# Patient Record
Sex: MALE | Race: WHITE | NOT HISPANIC OR LATINO | Employment: OTHER | URBAN - METROPOLITAN AREA
[De-identification: names, ages, dates, MRNs, and addresses within clinical notes are randomized per-mention and may not be internally consistent; named-entity substitution may affect disease eponyms.]

---

## 2017-01-01 ENCOUNTER — APPOINTMENT (OUTPATIENT)
Dept: CT IMAGING | Facility: HOSPITAL | Age: 67
End: 2017-01-01

## 2017-01-01 ENCOUNTER — APPOINTMENT (OUTPATIENT)
Dept: GENERAL RADIOLOGY | Facility: HOSPITAL | Age: 67
End: 2017-01-01

## 2017-01-01 ENCOUNTER — HOSPITAL ENCOUNTER (INPATIENT)
Facility: HOSPITAL | Age: 67
LOS: 2 days | Discharge: HOME | End: 2017-07-08
Attending: INTERNAL MEDICINE | Admitting: INTERNAL MEDICINE

## 2017-01-01 ENCOUNTER — HOSPITAL ENCOUNTER (EMERGENCY)
Facility: HOSPITAL | Age: 67
Discharge: LEFT BEFORE DISCHARGE | End: 2017-07-26
Attending: EMERGENCY MEDICINE | Admitting: EMERGENCY MEDICINE

## 2017-01-01 ENCOUNTER — HOSPITAL ENCOUNTER (OUTPATIENT)
Dept: CARDIOLOGY | Facility: HOSPITAL | Age: 67
Discharge: HOME | End: 2017-06-19
Attending: INTERNAL MEDICINE | Admitting: INTERNAL MEDICINE

## 2017-01-01 ENCOUNTER — LAB (OUTPATIENT)
Dept: LAB | Facility: HOSPITAL | Age: 67
End: 2017-01-01

## 2017-01-01 ENCOUNTER — OFFICE VISIT (OUTPATIENT)
Dept: CARDIOLOGY | Facility: HOSPITAL | Age: 67
End: 2017-01-01

## 2017-01-01 ENCOUNTER — OFFICE VISIT (OUTPATIENT)
Dept: CARDIOLOGY | Facility: CLINIC | Age: 67
End: 2017-01-01

## 2017-01-01 ENCOUNTER — APPOINTMENT (OUTPATIENT)
Dept: MRI IMAGING | Facility: HOSPITAL | Age: 67
End: 2017-01-01

## 2017-01-01 ENCOUNTER — HOSPITAL ENCOUNTER (INPATIENT)
Facility: HOSPITAL | Age: 67
LOS: 5 days | Discharge: HOME HEALTH | End: 2017-07-19
Attending: EMERGENCY MEDICINE | Admitting: FAMILY MEDICINE

## 2017-01-01 ENCOUNTER — OFFICE VISIT (OUTPATIENT)
Dept: NEUROLOGY | Facility: CLINIC | Age: 67
End: 2017-01-01

## 2017-01-01 ENCOUNTER — APPOINTMENT (OUTPATIENT)
Dept: CARDIOLOGY | Facility: HOSPITAL | Age: 67
End: 2017-01-01
Attending: INTERNAL MEDICINE

## 2017-01-01 ENCOUNTER — PROCEDURE VISIT (OUTPATIENT)
Dept: CARDIOLOGY | Facility: HOSPITAL | Age: 67
End: 2017-01-01

## 2017-01-01 ENCOUNTER — TELEPHONE (OUTPATIENT)
Dept: CARDIOLOGY | Facility: CLINIC | Age: 67
End: 2017-01-01

## 2017-01-01 ENCOUNTER — TRANSCRIBE ORDERS (OUTPATIENT)
Dept: LAB | Facility: HOSPITAL | Age: 67
End: 2017-01-01

## 2017-01-01 ENCOUNTER — APPOINTMENT (OUTPATIENT)
Dept: GENERAL RADIOLOGY | Facility: HOSPITAL | Age: 67
End: 2017-01-01
Attending: INTERNAL MEDICINE

## 2017-01-01 ENCOUNTER — HOSPITAL ENCOUNTER (OUTPATIENT)
Dept: GENERAL RADIOLOGY | Facility: HOSPITAL | Age: 67
Discharge: HOME | End: 2017-05-30
Attending: INTERNAL MEDICINE | Admitting: INTERNAL MEDICINE

## 2017-01-01 VITALS
HEIGHT: 71 IN | SYSTOLIC BLOOD PRESSURE: 138 MMHG | HEART RATE: 80 BPM | RESPIRATION RATE: 18 BRPM | BODY MASS INDEX: 31.3 KG/M2 | OXYGEN SATURATION: 93 % | TEMPERATURE: 98.1 F | DIASTOLIC BLOOD PRESSURE: 76 MMHG | WEIGHT: 223.6 LBS

## 2017-01-01 VITALS
OXYGEN SATURATION: 90 % | HEART RATE: 78 BPM | RESPIRATION RATE: 20 BRPM | HEIGHT: 71 IN | WEIGHT: 235 LBS | TEMPERATURE: 98 F | DIASTOLIC BLOOD PRESSURE: 96 MMHG | SYSTOLIC BLOOD PRESSURE: 156 MMHG | BODY MASS INDEX: 32.9 KG/M2

## 2017-01-01 VITALS
HEART RATE: 63 BPM | WEIGHT: 236.8 LBS | SYSTOLIC BLOOD PRESSURE: 126 MMHG | HEIGHT: 71 IN | OXYGEN SATURATION: 94 % | DIASTOLIC BLOOD PRESSURE: 76 MMHG | BODY MASS INDEX: 33.15 KG/M2

## 2017-01-01 VITALS
BODY MASS INDEX: 34.3 KG/M2 | OXYGEN SATURATION: 96 % | WEIGHT: 245 LBS | TEMPERATURE: 98.3 F | HEIGHT: 71 IN | SYSTOLIC BLOOD PRESSURE: 115 MMHG | HEART RATE: 67 BPM | DIASTOLIC BLOOD PRESSURE: 57 MMHG | RESPIRATION RATE: 18 BRPM

## 2017-01-01 VITALS
SYSTOLIC BLOOD PRESSURE: 128 MMHG | TEMPERATURE: 97 F | HEIGHT: 71 IN | DIASTOLIC BLOOD PRESSURE: 77 MMHG | OXYGEN SATURATION: 99 % | WEIGHT: 234.93 LBS | HEART RATE: 68 BPM | RESPIRATION RATE: 16 BRPM | BODY MASS INDEX: 32.89 KG/M2

## 2017-01-01 VITALS
SYSTOLIC BLOOD PRESSURE: 127 MMHG | RESPIRATION RATE: 22 BRPM | DIASTOLIC BLOOD PRESSURE: 85 MMHG | OXYGEN SATURATION: 96 % | HEIGHT: 71 IN | HEART RATE: 73 BPM | WEIGHT: 247 LBS | TEMPERATURE: 97.9 F | BODY MASS INDEX: 34.58 KG/M2

## 2017-01-01 VITALS — HEIGHT: 71 IN | WEIGHT: 232 LBS | BODY MASS INDEX: 32.48 KG/M2

## 2017-01-01 VITALS
WEIGHT: 254 LBS | BODY MASS INDEX: 35.56 KG/M2 | SYSTOLIC BLOOD PRESSURE: 112 MMHG | DIASTOLIC BLOOD PRESSURE: 66 MMHG | HEIGHT: 71 IN | HEART RATE: 72 BPM

## 2017-01-01 VITALS
HEIGHT: 71 IN | BODY MASS INDEX: 33.46 KG/M2 | WEIGHT: 239 LBS | SYSTOLIC BLOOD PRESSURE: 130 MMHG | RESPIRATION RATE: 20 BRPM | DIASTOLIC BLOOD PRESSURE: 82 MMHG | TEMPERATURE: 97.7 F | HEART RATE: 69 BPM | OXYGEN SATURATION: 97 %

## 2017-01-01 VITALS
HEART RATE: 79 BPM | OXYGEN SATURATION: 96 % | BODY MASS INDEX: 34.02 KG/M2 | RESPIRATION RATE: 20 BRPM | TEMPERATURE: 97.2 F | SYSTOLIC BLOOD PRESSURE: 135 MMHG | HEIGHT: 71 IN | DIASTOLIC BLOOD PRESSURE: 79 MMHG | WEIGHT: 243 LBS

## 2017-01-01 VITALS
DIASTOLIC BLOOD PRESSURE: 81 MMHG | HEIGHT: 71 IN | SYSTOLIC BLOOD PRESSURE: 122 MMHG | BODY MASS INDEX: 33.74 KG/M2 | WEIGHT: 241 LBS | HEART RATE: 62 BPM

## 2017-01-01 VITALS
BODY MASS INDEX: 32.59 KG/M2 | HEART RATE: 67 BPM | WEIGHT: 232.8 LBS | HEIGHT: 71 IN | DIASTOLIC BLOOD PRESSURE: 82 MMHG | SYSTOLIC BLOOD PRESSURE: 106 MMHG

## 2017-01-01 DIAGNOSIS — J44.9 CHRONIC OBSTRUCTIVE PULMONARY DISEASE, UNSPECIFIED COPD TYPE (HCC): ICD-10-CM

## 2017-01-01 DIAGNOSIS — Z72.0 TOBACCO USE: ICD-10-CM

## 2017-01-01 DIAGNOSIS — I48.0 PAF (PAROXYSMAL ATRIAL FIBRILLATION) (HCC): ICD-10-CM

## 2017-01-01 DIAGNOSIS — I38 VALVULAR HEART DISEASE: ICD-10-CM

## 2017-01-01 DIAGNOSIS — R78.81 BACTEREMIA: ICD-10-CM

## 2017-01-01 DIAGNOSIS — K56.600 PARTIAL SMALL BOWEL OBSTRUCTION (HCC): Primary | ICD-10-CM

## 2017-01-01 DIAGNOSIS — I25.10 CORONARY ARTERY DISEASE INVOLVING NATIVE HEART WITHOUT ANGINA PECTORIS, UNSPECIFIED VESSEL OR LESION TYPE: Primary | ICD-10-CM

## 2017-01-01 DIAGNOSIS — R01.1 UNDIAGNOSED CARDIAC MURMURS: ICD-10-CM

## 2017-01-01 DIAGNOSIS — E78.5 HYPERLIPIDEMIA LDL GOAL <70: ICD-10-CM

## 2017-01-01 DIAGNOSIS — I25.709 CORONARY ARTERY DISEASE INVOLVING CORONARY BYPASS GRAFT OF NATIVE HEART WITH ANGINA PECTORIS (HCC): ICD-10-CM

## 2017-01-01 DIAGNOSIS — G89.29 CHRONIC LOW BACK PAIN WITH SCIATICA, SCIATICA LATERALITY UNSPECIFIED, UNSPECIFIED BACK PAIN LATERALITY: ICD-10-CM

## 2017-01-01 DIAGNOSIS — N18.30 CHRONIC KIDNEY DISEASE, STAGE III (MODERATE) (HCC): ICD-10-CM

## 2017-01-01 DIAGNOSIS — Z99.81 SUPPLEMENTAL OXYGEN DEPENDENT: ICD-10-CM

## 2017-01-01 DIAGNOSIS — I25.10 CORONARY ARTERY DISEASE INVOLVING NATIVE HEART WITHOUT ANGINA PECTORIS, UNSPECIFIED VESSEL OR LESION TYPE: ICD-10-CM

## 2017-01-01 DIAGNOSIS — I50.33 ACUTE ON CHRONIC DIASTOLIC HEART FAILURE (HCC): Primary | ICD-10-CM

## 2017-01-01 DIAGNOSIS — I25.10 CORONARY ARTERY DISEASE INVOLVING NATIVE CORONARY ARTERY OF NATIVE HEART WITHOUT ANGINA PECTORIS: ICD-10-CM

## 2017-01-01 DIAGNOSIS — Z79.899 HIGH RISK MEDICATION USE: ICD-10-CM

## 2017-01-01 DIAGNOSIS — Z99.81 OXYGEN DEPENDENT: ICD-10-CM

## 2017-01-01 DIAGNOSIS — M54.40 CHRONIC LOW BACK PAIN WITH SCIATICA, SCIATICA LATERALITY UNSPECIFIED, UNSPECIFIED BACK PAIN LATERALITY: ICD-10-CM

## 2017-01-01 DIAGNOSIS — D61.818 ACQUIRED PANCYTOPENIA (HCC): ICD-10-CM

## 2017-01-01 DIAGNOSIS — R10.11 RUQ ABDOMINAL PAIN: ICD-10-CM

## 2017-01-01 DIAGNOSIS — I25.10 CORONARY ARTERY DISEASE INVOLVING NATIVE CORONARY ARTERY OF NATIVE HEART, ANGINA PRESENCE UNSPECIFIED: ICD-10-CM

## 2017-01-01 DIAGNOSIS — I45.2 BIFASCICULAR BLOCK: ICD-10-CM

## 2017-01-01 DIAGNOSIS — G47.33 OSA (OBSTRUCTIVE SLEEP APNEA): ICD-10-CM

## 2017-01-01 DIAGNOSIS — F11.90 CHRONIC NARCOTIC USE: ICD-10-CM

## 2017-01-01 DIAGNOSIS — I10 ESSENTIAL HYPERTENSION: ICD-10-CM

## 2017-01-01 DIAGNOSIS — I48.0 PAROXYSMAL ATRIAL FIBRILLATION (HCC): ICD-10-CM

## 2017-01-01 DIAGNOSIS — I50.22 CHRONIC SYSTOLIC CONGESTIVE HEART FAILURE (HCC): Primary | ICD-10-CM

## 2017-01-01 DIAGNOSIS — I50.33 ACUTE ON CHRONIC DIASTOLIC HEART FAILURE (HCC): ICD-10-CM

## 2017-01-01 DIAGNOSIS — D15.1 BENIGN NEOPLASM OF HEART: ICD-10-CM

## 2017-01-01 DIAGNOSIS — M54.41 CHRONIC LOW BACK PAIN WITH BILATERAL SCIATICA, UNSPECIFIED BACK PAIN LATERALITY: ICD-10-CM

## 2017-01-01 DIAGNOSIS — Z79.899 HIGH RISK MEDICATION USE: Primary | ICD-10-CM

## 2017-01-01 DIAGNOSIS — A41.01 METHICILLIN SUSCEPTIBLE STAPHYLOCOCCUS AUREUS SEPTICEMIA (HCC): ICD-10-CM

## 2017-01-01 DIAGNOSIS — I25.118 CORONARY ARTERY DISEASE WITH OTHER FORM OF ANGINA PECTORIS, UNSPECIFIED VESSEL OR LESION TYPE, UNSPECIFIED WHETHER NATIVE OR TRANSPLANTED HEART (HCC): ICD-10-CM

## 2017-01-01 DIAGNOSIS — E83.42 HYPOMAGNESEMIA: ICD-10-CM

## 2017-01-01 DIAGNOSIS — Z86.73 HISTORY OF STROKE: ICD-10-CM

## 2017-01-01 DIAGNOSIS — I48.0 PAF (PAROXYSMAL ATRIAL FIBRILLATION) (HCC): Primary | ICD-10-CM

## 2017-01-01 DIAGNOSIS — E78.5 DYSLIPIDEMIA: ICD-10-CM

## 2017-01-01 DIAGNOSIS — K82.9 GALLBLADDER DISEASE: ICD-10-CM

## 2017-01-01 DIAGNOSIS — I50.23 ACUTE ON CHRONIC SYSTOLIC CONGESTIVE HEART FAILURE (HCC): ICD-10-CM

## 2017-01-01 DIAGNOSIS — I50.22 CHRONIC SYSTOLIC CONGESTIVE HEART FAILURE (HCC): ICD-10-CM

## 2017-01-01 DIAGNOSIS — D61.818 ACQUIRED PANCYTOPENIA (HCC): Primary | ICD-10-CM

## 2017-01-01 DIAGNOSIS — R55 SYNCOPE, UNSPECIFIED SYNCOPE TYPE: Primary | ICD-10-CM

## 2017-01-01 DIAGNOSIS — G89.29 CHRONIC LOW BACK PAIN WITH BILATERAL SCIATICA, UNSPECIFIED BACK PAIN LATERALITY: ICD-10-CM

## 2017-01-01 DIAGNOSIS — G25.0 ESSENTIAL TREMOR: ICD-10-CM

## 2017-01-01 DIAGNOSIS — D63.8 ANEMIA OF CHRONIC DISEASE: ICD-10-CM

## 2017-01-01 DIAGNOSIS — M54.42 CHRONIC LOW BACK PAIN WITH BILATERAL SCIATICA, UNSPECIFIED BACK PAIN LATERALITY: ICD-10-CM

## 2017-01-01 DIAGNOSIS — K56.609 SBO (SMALL BOWEL OBSTRUCTION) (HCC): ICD-10-CM

## 2017-01-01 DIAGNOSIS — F41.9 ANXIETY: ICD-10-CM

## 2017-01-01 DIAGNOSIS — N18.30 CKD (CHRONIC KIDNEY DISEASE) STAGE 3, GFR 30-59 ML/MIN (HCC): ICD-10-CM

## 2017-01-01 DIAGNOSIS — E83.51 HYPOCALCEMIA: ICD-10-CM

## 2017-01-01 DIAGNOSIS — I25.709 CORONARY ARTERY DISEASE INVOLVING CORONARY BYPASS GRAFT OF NATIVE HEART WITH ANGINA PECTORIS (HCC): Primary | ICD-10-CM

## 2017-01-01 DIAGNOSIS — I50.23 ACUTE ON CHRONIC SYSTOLIC HEART FAILURE (HCC): Primary | ICD-10-CM

## 2017-01-01 DIAGNOSIS — G93.40 ENCEPHALOPATHY: Primary | ICD-10-CM

## 2017-01-01 DIAGNOSIS — D50.9 IRON DEFICIENCY ANEMIA, UNSPECIFIED IRON DEFICIENCY ANEMIA TYPE: ICD-10-CM

## 2017-01-01 DIAGNOSIS — I25.119 CORONARY ARTERY DISEASE WITH ANGINA PECTORIS, UNSPECIFIED VESSEL OR LESION TYPE, UNSPECIFIED WHETHER NATIVE OR TRANSPLANTED HEART (HCC): ICD-10-CM

## 2017-01-01 LAB
ALBUMIN SERPL-MCNC: 3.5 G/DL (ref 3.2–4.8)
ALBUMIN SERPL-MCNC: 3.9 G/DL (ref 3.2–4.8)
ALBUMIN SERPL-MCNC: 3.9 G/DL (ref 3.2–4.8)
ALBUMIN SERPL-MCNC: 4 G/DL (ref 3.2–4.8)
ALBUMIN SERPL-MCNC: 4.3 G/DL (ref 3.2–4.8)
ALBUMIN SERPL-MCNC: 4.5 G/DL (ref 3.2–4.8)
ALBUMIN/GLOB SERPL: 0.9 G/DL (ref 1.5–2.5)
ALBUMIN/GLOB SERPL: 0.9 G/DL (ref 1.5–2.5)
ALBUMIN/GLOB SERPL: 1 G/DL (ref 1.5–2.5)
ALBUMIN/GLOB SERPL: 1 G/DL (ref 1.5–2.5)
ALBUMIN/GLOB SERPL: 1.1 G/DL (ref 1.5–2.5)
ALBUMIN/GLOB SERPL: 1.3 G/DL (ref 1.5–2.5)
ALP SERPL-CCNC: 66 U/L (ref 25–100)
ALP SERPL-CCNC: 67 U/L (ref 25–100)
ALP SERPL-CCNC: 69 U/L (ref 25–100)
ALP SERPL-CCNC: 72 U/L (ref 25–100)
ALP SERPL-CCNC: 74 U/L (ref 25–100)
ALP SERPL-CCNC: 86 U/L (ref 25–100)
ALT SERPL W P-5'-P-CCNC: 10 U/L (ref 7–40)
ALT SERPL W P-5'-P-CCNC: 11 U/L (ref 7–40)
ALT SERPL W P-5'-P-CCNC: 12 U/L (ref 7–40)
ALT SERPL W P-5'-P-CCNC: 17 U/L (ref 7–40)
ALT SERPL W P-5'-P-CCNC: 5 U/L (ref 7–40)
ALT SERPL W P-5'-P-CCNC: 7 U/L (ref 7–40)
AMMONIA BLD-SCNC: 45 UMOL/L (ref 9–30)
ANION GAP SERPL CALCULATED.3IONS-SCNC: 10 MMOL/L (ref 3–11)
ANION GAP SERPL CALCULATED.3IONS-SCNC: 10 MMOL/L (ref 3–11)
ANION GAP SERPL CALCULATED.3IONS-SCNC: 11 MMOL/L (ref 3–11)
ANION GAP SERPL CALCULATED.3IONS-SCNC: 15.7 MMOL/L
ANION GAP SERPL CALCULATED.3IONS-SCNC: 19.1 MMOL/L
ANION GAP SERPL CALCULATED.3IONS-SCNC: 20.9 MMOL/L
ANION GAP SERPL CALCULATED.3IONS-SCNC: 3 MMOL/L (ref 3–11)
ANION GAP SERPL CALCULATED.3IONS-SCNC: 6 MMOL/L (ref 3–11)
ANION GAP SERPL CALCULATED.3IONS-SCNC: 6 MMOL/L (ref 3–11)
ANION GAP SERPL CALCULATED.3IONS-SCNC: 7 MMOL/L (ref 3–11)
ANION GAP SERPL CALCULATED.3IONS-SCNC: 7 MMOL/L (ref 3–11)
ANION GAP SERPL CALCULATED.3IONS-SCNC: 8 MMOL/L (ref 3–11)
ANION GAP SERPL CALCULATED.3IONS-SCNC: 9 MMOL/L (ref 3–11)
ANISOCYTOSIS BLD QL: ABNORMAL
ANISOCYTOSIS BLD QL: NORMAL
ARTERIAL PATENCY WRIST A: POSITIVE
AST SERPL-CCNC: 13 U/L (ref 0–33)
AST SERPL-CCNC: 14 U/L (ref 0–33)
AST SERPL-CCNC: 15 U/L (ref 0–33)
AST SERPL-CCNC: 18 U/L (ref 0–33)
AST SERPL-CCNC: 19 U/L (ref 0–33)
AST SERPL-CCNC: 24 U/L (ref 0–33)
BACTERIA BLD CULT: ABNORMAL
BACTERIA SPEC AEROBE CULT: ABNORMAL
BACTERIA SPEC AEROBE CULT: ABNORMAL
BACTERIA SPEC AEROBE CULT: NORMAL
BACTERIA UR QL AUTO: ABNORMAL /HPF
BACTERIA UR QL AUTO: NORMAL /HPF
BASE EXCESS BLDA CALC-SCNC: 3.9 MMOL/L
BASOPHILS # BLD AUTO: 0.01 10*3/MM3 (ref 0–0.2)
BASOPHILS # BLD AUTO: 0.02 10*3/MM3 (ref 0–0.2)
BASOPHILS # BLD AUTO: 0.03 10*3/MM3 (ref 0–0.2)
BASOPHILS # BLD AUTO: 0.04 10*3/MM3 (ref 0–0.2)
BASOPHILS # BLD AUTO: 0.05 10*3/MM3 (ref 0–0.2)
BASOPHILS # BLD AUTO: 0.05 10*3/MM3 (ref 0–0.2)
BASOPHILS NFR BLD AUTO: 0.2 % (ref 0–1)
BASOPHILS NFR BLD AUTO: 0.3 % (ref 0–1)
BASOPHILS NFR BLD AUTO: 0.3 % (ref 0–2.5)
BASOPHILS NFR BLD AUTO: 0.5 % (ref 0–1)
BASOPHILS NFR BLD AUTO: 0.7 % (ref 0–1)
BASOPHILS NFR BLD AUTO: 0.9 % (ref 0–1)
BDY SITE: ABNORMAL
BH CV ECHO MEAS - AO MAX PG (FULL): 35.6 MMHG
BH CV ECHO MEAS - AO MAX PG (FULL): 47.6 MMHG
BH CV ECHO MEAS - AO MAX PG: 40.8 MMHG
BH CV ECHO MEAS - AO MAX PG: 51 MMHG
BH CV ECHO MEAS - AO MEAN PG (FULL): 20.2 MMHG
BH CV ECHO MEAS - AO MEAN PG (FULL): 24.7 MMHG
BH CV ECHO MEAS - AO MEAN PG: 23.2 MMHG
BH CV ECHO MEAS - AO MEAN PG: 27 MMHG
BH CV ECHO MEAS - AO ROOT AREA (BSA CORRECTED): 1.5
BH CV ECHO MEAS - AO ROOT AREA (BSA CORRECTED): 1.5
BH CV ECHO MEAS - AO ROOT AREA: 9.1 CM^2
BH CV ECHO MEAS - AO ROOT AREA: 9.1 CM^2
BH CV ECHO MEAS - AO ROOT DIAM: 3.4 CM
BH CV ECHO MEAS - AO ROOT DIAM: 3.4 CM
BH CV ECHO MEAS - AO V2 MAX: 319 CM/SEC
BH CV ECHO MEAS - AO V2 MAX: 359 CM/SEC
BH CV ECHO MEAS - AO V2 MEAN: 223.2 CM/SEC
BH CV ECHO MEAS - AO V2 MEAN: 242 CM/SEC
BH CV ECHO MEAS - AO V2 VTI: 68.4 CM
BH CV ECHO MEAS - AO V2 VTI: 79.9 CM
BH CV ECHO MEAS - AVA(I,A): 0.89 CM^2
BH CV ECHO MEAS - AVA(I,A): 1 CM^2
BH CV ECHO MEAS - AVA(I,D): 0.89 CM^2
BH CV ECHO MEAS - AVA(I,D): 1 CM^2
BH CV ECHO MEAS - AVA(V,A): 0.87 CM^2
BH CV ECHO MEAS - AVA(V,A): 1.1 CM^2
BH CV ECHO MEAS - AVA(V,D): 0.87 CM^2
BH CV ECHO MEAS - AVA(V,D): 1.1 CM^2
BH CV ECHO MEAS - BSA(HAYCOCK): 2.3 M^2
BH CV ECHO MEAS - BSA(HAYCOCK): 2.3 M^2
BH CV ECHO MEAS - BSA: 2.2 M^2
BH CV ECHO MEAS - BSA: 2.2 M^2
BH CV ECHO MEAS - BZI_BMI: 32.4 KILOGRAMS/M^2
BH CV ECHO MEAS - BZI_BMI: 32.4 KILOGRAMS/M^2
BH CV ECHO MEAS - BZI_METRIC_HEIGHT: 180.3 CM
BH CV ECHO MEAS - BZI_METRIC_HEIGHT: 180.3 CM
BH CV ECHO MEAS - BZI_METRIC_WEIGHT: 105.2 KG
BH CV ECHO MEAS - BZI_METRIC_WEIGHT: 105.2 KG
BH CV ECHO MEAS - CONTRAST EF (2CH): 36.3 ML/M^2
BH CV ECHO MEAS - CONTRAST EF (2CH): 37.9 ML/M^2
BH CV ECHO MEAS - CONTRAST EF 4CH: 39.6 ML/M^2
BH CV ECHO MEAS - CONTRAST EF 4CH: 44 ML/M^2
BH CV ECHO MEAS - EDV(CUBED): 182.3 ML
BH CV ECHO MEAS - EDV(CUBED): 191.1 ML
BH CV ECHO MEAS - EDV(MOD-SP2): 132 ML
BH CV ECHO MEAS - EDV(MOD-SP2): 160 ML
BH CV ECHO MEAS - EDV(MOD-SP4): 149 ML
BH CV ECHO MEAS - EDV(MOD-SP4): 200 ML
BH CV ECHO MEAS - EDV(TEICH): 158.1 ML
BH CV ECHO MEAS - EDV(TEICH): 163.9 ML
BH CV ECHO MEAS - EF(CUBED): 59.8 %
BH CV ECHO MEAS - EF(CUBED): 65.4 %
BH CV ECHO MEAS - EF(MOD-SP2): 36.3 %
BH CV ECHO MEAS - EF(MOD-SP2): 37.9 %
BH CV ECHO MEAS - EF(MOD-SP4): 39.6 %
BH CV ECHO MEAS - EF(TEICH): 50.7 %
BH CV ECHO MEAS - EF(TEICH): 56.3 %
BH CV ECHO MEAS - ESV(CUBED): 63 ML
BH CV ECHO MEAS - ESV(CUBED): 76.8 ML
BH CV ECHO MEAS - ESV(MOD-SP2): 102 ML
BH CV ECHO MEAS - ESV(MOD-SP2): 82 ML
BH CV ECHO MEAS - ESV(MOD-SP4): 112 ML
BH CV ECHO MEAS - ESV(MOD-SP4): 90 ML
BH CV ECHO MEAS - ESV(TEICH): 69.2 ML
BH CV ECHO MEAS - ESV(TEICH): 80.8 ML
BH CV ECHO MEAS - FS: 26.2 %
BH CV ECHO MEAS - FS: 29.8 %
BH CV ECHO MEAS - IVS/LVPW: 0.95
BH CV ECHO MEAS - IVS/LVPW: 1
BH CV ECHO MEAS - IVSD: 1.2 CM
BH CV ECHO MEAS - IVSD: 1.2 CM
BH CV ECHO MEAS - LA DIMENSION: 4.6 CM
BH CV ECHO MEAS - LA DIMENSION: 5.3 CM
BH CV ECHO MEAS - LA/AO: 1.4
BH CV ECHO MEAS - LA/AO: 1.5
BH CV ECHO MEAS - LAT PEAK E' VEL: 10 CM/SEC
BH CV ECHO MEAS - LAT PEAK E' VEL: 7.4 CM/SEC
BH CV ECHO MEAS - LV DIASTOLIC VOL/BSA (35-75): 66.3 ML/M^2
BH CV ECHO MEAS - LV DIASTOLIC VOL/BSA (35-75): 89 ML/M^2
BH CV ECHO MEAS - LV MASS(C)D: 285.4 GRAMS
BH CV ECHO MEAS - LV MASS(C)D: 301 GRAMS
BH CV ECHO MEAS - LV MASS(C)DI: 127.1 GRAMS/M^2
BH CV ECHO MEAS - LV MASS(C)DI: 134 GRAMS/M^2
BH CV ECHO MEAS - LV MAX PG: 3.9 MMHG
BH CV ECHO MEAS - LV MAX PG: 5.2 MMHG
BH CV ECHO MEAS - LV MEAN PG: 2.3 MMHG
BH CV ECHO MEAS - LV MEAN PG: 3 MMHG
BH CV ECHO MEAS - LV SYSTOLIC VOL/BSA (12-30): 40.1 ML/M^2
BH CV ECHO MEAS - LV SYSTOLIC VOL/BSA (12-30): 49.9 ML/M^2
BH CV ECHO MEAS - LV V1 MAX: 113.5 CM/SEC
BH CV ECHO MEAS - LV V1 MAX: 98.9 CM/SEC
BH CV ECHO MEAS - LV V1 MEAN: 71.8 CM/SEC
BH CV ECHO MEAS - LV V1 MEAN: 72.3 CM/SEC
BH CV ECHO MEAS - LV V1 VTI: 22.5 CM
BH CV ECHO MEAS - LV V1 VTI: 22.6 CM
BH CV ECHO MEAS - LVIDD: 5.7 CM
BH CV ECHO MEAS - LVIDD: 5.8 CM
BH CV ECHO MEAS - LVIDS: 4 CM
BH CV ECHO MEAS - LVIDS: 4.3 CM
BH CV ECHO MEAS - LVLD AP2: 8.3 CM
BH CV ECHO MEAS - LVLD AP2: 9 CM
BH CV ECHO MEAS - LVLD AP4: 8.2 CM
BH CV ECHO MEAS - LVLD AP4: 8.8 CM
BH CV ECHO MEAS - LVLS AP2: 7.5 CM
BH CV ECHO MEAS - LVLS AP2: 8.2 CM
BH CV ECHO MEAS - LVLS AP4: 7.3 CM
BH CV ECHO MEAS - LVLS AP4: 8.3 CM
BH CV ECHO MEAS - LVOT AREA (M): 3.1 CM^2
BH CV ECHO MEAS - LVOT AREA (M): 3.1 CM^2
BH CV ECHO MEAS - LVOT AREA: 3.1 CM^2
BH CV ECHO MEAS - LVOT AREA: 3.1 CM^2
BH CV ECHO MEAS - LVOT DIAM: 2 CM
BH CV ECHO MEAS - LVOT DIAM: 2 CM
BH CV ECHO MEAS - LVPWD: 1.2 CM
BH CV ECHO MEAS - LVPWD: 1.3 CM
BH CV ECHO MEAS - MED PEAK E' VEL: 4.6 CM/SEC
BH CV ECHO MEAS - MED PEAK E' VEL: 7.02 CM/SEC
BH CV ECHO MEAS - MV A MAX VEL: 43.4 CM/SEC
BH CV ECHO MEAS - MV A MAX VEL: 78.4 CM/SEC
BH CV ECHO MEAS - MV DEC TIME: 0.14 SEC
BH CV ECHO MEAS - MV E MAX VEL: 106 CM/SEC
BH CV ECHO MEAS - MV E MAX VEL: 131 CM/SEC
BH CV ECHO MEAS - MV E/A: 1.7
BH CV ECHO MEAS - MV E/A: 2.4
BH CV ECHO MEAS - PA ACC SLOPE: 277 CM/SEC^2
BH CV ECHO MEAS - PA ACC SLOPE: 928 CM/SEC^2
BH CV ECHO MEAS - PA ACC TIME: 0.1 SEC
BH CV ECHO MEAS - PA ACC TIME: 0.17 SEC
BH CV ECHO MEAS - PA PR(ACCEL): 35.4 MMHG
BH CV ECHO MEAS - PA PR(ACCEL): 4.8 MMHG
BH CV ECHO MEAS - PI END-D VEL: 39.1 CM/SEC
BH CV ECHO MEAS - RAP SYSTOLE: 10 MMHG
BH CV ECHO MEAS - RAP SYSTOLE: 8 MMHG
BH CV ECHO MEAS - RVSP: 42.6 MMHG
BH CV ECHO MEAS - RVSP: 46 MMHG
BH CV ECHO MEAS - SI(AO): 276.6 ML/M^2
BH CV ECHO MEAS - SI(AO): 323 ML/M^2
BH CV ECHO MEAS - SI(CUBED): 50.9 ML/M^2
BH CV ECHO MEAS - SI(CUBED): 53.1 ML/M^2
BH CV ECHO MEAS - SI(LVOT): 31.5 ML/M^2
BH CV ECHO MEAS - SI(LVOT): 31.6 ML/M^2
BH CV ECHO MEAS - SI(MOD-SP2): 22.3 ML/M^2
BH CV ECHO MEAS - SI(MOD-SP2): 25.8 ML/M^2
BH CV ECHO MEAS - SI(MOD-SP4): 26.3 ML/M^2
BH CV ECHO MEAS - SI(MOD-SP4): 39.2 ML/M^2
BH CV ECHO MEAS - SI(TEICH): 37 ML/M^2
BH CV ECHO MEAS - SI(TEICH): 39.6 ML/M^2
BH CV ECHO MEAS - SV(AO): 621.4 ML
BH CV ECHO MEAS - SV(AO): 725.4 ML
BH CV ECHO MEAS - SV(CUBED): 114.3 ML
BH CV ECHO MEAS - SV(CUBED): 119.2 ML
BH CV ECHO MEAS - SV(LVOT): 70.8 ML
BH CV ECHO MEAS - SV(LVOT): 71 ML
BH CV ECHO MEAS - SV(MOD-SP2): 50 ML
BH CV ECHO MEAS - SV(MOD-SP2): 58 ML
BH CV ECHO MEAS - SV(MOD-SP4): 59 ML
BH CV ECHO MEAS - SV(MOD-SP4): 88 ML
BH CV ECHO MEAS - SV(TEICH): 83.1 ML
BH CV ECHO MEAS - SV(TEICH): 88.9 ML
BH CV ECHO MEAS - TAPSE (>1.6): 1.2 CM2
BH CV ECHO MEAS - TAPSE (>1.6): 1.3 CM2
BH CV ECHO MEAS - TR MAX VEL: 280.3 CM/SEC
BH CV ECHO MEAS - TR MAX VEL: 331 CM/SEC
BH CV VAS BP LEFT ARM: NORMAL MMHG
BH CV VAS BP RIGHT ARM: NORMAL MMHG
BH CV XLRA - RV BASE: 5.4 CM
BH CV XLRA - RV BASE: 5.5 CM
BH CV XLRA - RV LENGTH: 8.1 CM
BH CV XLRA - RV LENGTH: 8.2 CM
BH CV XLRA - RV MID: 4.2 CM
BH CV XLRA - RV MID: 4.7 CM
BH CV XLRA - TDI S': 10.1 CM/SEC
BILIRUB SERPL-MCNC: 0.4 MG/DL (ref 0.3–1.2)
BILIRUB SERPL-MCNC: 0.4 MG/DL (ref 0.3–1.2)
BILIRUB SERPL-MCNC: 0.5 MG/DL (ref 0.3–1.2)
BILIRUB SERPL-MCNC: 0.6 MG/DL (ref 0.3–1.2)
BILIRUB SERPL-MCNC: 0.6 MG/DL (ref 0.3–1.2)
BILIRUB SERPL-MCNC: 1.1 MG/DL (ref 0.3–1.2)
BILIRUB UR QL STRIP: NEGATIVE
BNP SERPL-MCNC: 398 PG/ML (ref 0–100)
BNP SERPL-MCNC: 569 PG/ML (ref 0–100)
BUN BLD-MCNC: 16 MG/DL (ref 9–23)
BUN BLD-MCNC: 16 MG/DL (ref 9–23)
BUN BLD-MCNC: 18 MG/DL (ref 9–23)
BUN BLD-MCNC: 20 MG/DL (ref 9–23)
BUN BLD-MCNC: 20 MG/DL (ref 9–23)
BUN BLD-MCNC: 21 MG/DL (ref 9–23)
BUN BLD-MCNC: 21 MG/DL (ref 9–23)
BUN BLD-MCNC: 23 MG/DL (ref 9–23)
BUN BLD-MCNC: 24 MG/DL (ref 9–23)
BUN BLD-MCNC: 25 MG/DL (ref 9–23)
BUN BLD-MCNC: 28 MG/DL (ref 9–23)
BUN BLD-MCNC: 29 MG/DL (ref 7–20)
BUN BLD-MCNC: 30 MG/DL (ref 7–20)
BUN BLD-MCNC: 32 MG/DL (ref 7–20)
BUN BLD-MCNC: 39 MG/DL (ref 9–23)
BUN/CREAT SERPL: 16 (ref 7–25)
BUN/CREAT SERPL: 16 (ref 7–25)
BUN/CREAT SERPL: 16.2 (ref 7–25)
BUN/CREAT SERPL: 16.7 (ref 7–25)
BUN/CREAT SERPL: 17.7 (ref 7–25)
BUN/CREAT SERPL: 17.8 (ref 7–25)
BUN/CREAT SERPL: 18.2 (ref 7–25)
BUN/CREAT SERPL: 19.1 (ref 7–25)
BUN/CREAT SERPL: 19.5 (ref 7–25)
BUN/CREAT SERPL: 20 (ref 7–25)
BUN/CREAT SERPL: 20 (ref 7–25)
BUN/CREAT SERPL: 22.9 (ref 6.3–21.9)
BUN/CREAT SERPL: 23.3 (ref 7–25)
BUN/CREAT SERPL: 26.4 (ref 6.3–21.9)
BUN/CREAT SERPL: 27.3 (ref 6.3–21.9)
CA-I SERPL ISE-MCNC: 1.11 MMOL/L (ref 1.12–1.32)
CALCIUM SPEC-SCNC: 10 MG/DL (ref 8.7–10.4)
CALCIUM SPEC-SCNC: 8.6 MG/DL (ref 8.7–10.4)
CALCIUM SPEC-SCNC: 8.6 MG/DL (ref 8.7–10.4)
CALCIUM SPEC-SCNC: 8.7 MG/DL (ref 8.7–10.4)
CALCIUM SPEC-SCNC: 9 MG/DL (ref 8.4–10.2)
CALCIUM SPEC-SCNC: 9 MG/DL (ref 8.7–10.4)
CALCIUM SPEC-SCNC: 9 MG/DL (ref 8.7–10.4)
CALCIUM SPEC-SCNC: 9.1 MG/DL (ref 8.7–10.4)
CALCIUM SPEC-SCNC: 9.2 MG/DL (ref 8.4–10.2)
CALCIUM SPEC-SCNC: 9.2 MG/DL (ref 8.7–10.4)
CALCIUM SPEC-SCNC: 9.4 MG/DL (ref 8.7–10.4)
CALCIUM SPEC-SCNC: 9.5 MG/DL (ref 8.4–10.2)
CALCIUM SPEC-SCNC: 9.5 MG/DL (ref 8.7–10.4)
CALCIUM SPEC-SCNC: 9.5 MG/DL (ref 8.7–10.4)
CALCIUM SPEC-SCNC: 9.8 MG/DL (ref 8.7–10.4)
CHLORIDE SERPL-SCNC: 100 MMOL/L (ref 98–107)
CHLORIDE SERPL-SCNC: 100 MMOL/L (ref 99–109)
CHLORIDE SERPL-SCNC: 101 MMOL/L (ref 98–107)
CHLORIDE SERPL-SCNC: 102 MMOL/L (ref 98–107)
CHLORIDE SERPL-SCNC: 103 MMOL/L (ref 99–109)
CHLORIDE SERPL-SCNC: 103 MMOL/L (ref 99–109)
CHLORIDE SERPL-SCNC: 106 MMOL/L (ref 99–109)
CHLORIDE SERPL-SCNC: 106 MMOL/L (ref 99–109)
CHLORIDE SERPL-SCNC: 94 MMOL/L (ref 99–109)
CHLORIDE SERPL-SCNC: 94 MMOL/L (ref 99–109)
CHLORIDE SERPL-SCNC: 95 MMOL/L (ref 99–109)
CHLORIDE SERPL-SCNC: 96 MMOL/L (ref 99–109)
CHLORIDE SERPL-SCNC: 97 MMOL/L (ref 99–109)
CHLORIDE SERPL-SCNC: 98 MMOL/L (ref 99–109)
CHLORIDE SERPL-SCNC: 99 MMOL/L (ref 99–109)
CLARITY UR: CLEAR
CO2 SERPL-SCNC: 23 MMOL/L (ref 26–30)
CO2 SERPL-SCNC: 25 MMOL/L (ref 20–31)
CO2 SERPL-SCNC: 25 MMOL/L (ref 20–31)
CO2 SERPL-SCNC: 26 MMOL/L (ref 20–31)
CO2 SERPL-SCNC: 26 MMOL/L (ref 26–30)
CO2 SERPL-SCNC: 26 MMOL/L (ref 26–30)
CO2 SERPL-SCNC: 28 MMOL/L (ref 20–31)
CO2 SERPL-SCNC: 28 MMOL/L (ref 20–31)
CO2 SERPL-SCNC: 29 MMOL/L (ref 20–31)
CO2 SERPL-SCNC: 30 MMOL/L (ref 20–31)
CO2 SERPL-SCNC: 30 MMOL/L (ref 20–31)
CO2 SERPL-SCNC: 32 MMOL/L (ref 20–31)
CO2 SERPL-SCNC: 33 MMOL/L (ref 20–31)
COHGB MFR BLD: 2.8 %
COLOR UR: YELLOW
CREAT BLD-MCNC: 0.9 MG/DL (ref 0.6–1.3)
CREAT BLD-MCNC: 0.9 MG/DL (ref 0.6–1.3)
CREAT BLD-MCNC: 1 MG/DL (ref 0.6–1.3)
CREAT BLD-MCNC: 1 MG/DL (ref 0.6–1.3)
CREAT BLD-MCNC: 1.1 MG/DL (ref 0.6–1.3)
CREAT BLD-MCNC: 1.2 MG/DL (ref 0.6–1.3)
CREAT BLD-MCNC: 1.3 MG/DL (ref 0.6–1.3)
CREAT BLD-MCNC: 1.3 MG/DL (ref 0.6–1.3)
CREAT BLD-MCNC: 1.4 MG/DL (ref 0.6–1.3)
CREAT BLD-MCNC: 1.5 MG/DL (ref 0.6–1.3)
CREAT BLD-MCNC: 1.5 MG/DL (ref 0.6–1.3)
CREAT BLD-MCNC: 2 MG/DL (ref 0.6–1.3)
CREAT UR-MCNC: 43.6 MG/DL
D-LACTATE SERPL-SCNC: 1.1 MMOL/L (ref 0.5–2)
D-LACTATE SERPL-SCNC: 1.5 MMOL/L (ref 0.5–2)
D-LACTATE SERPL-SCNC: 1.7 MMOL/L (ref 0.5–2)
D-LACTATE SERPL-SCNC: 2.1 MMOL/L (ref 0.5–2)
DEPRECATED RDW RBC AUTO: 47 FL (ref 37–54)
DEPRECATED RDW RBC AUTO: 47.4 FL (ref 37–54)
DEPRECATED RDW RBC AUTO: 48.1 FL (ref 37–54)
DEPRECATED RDW RBC AUTO: 48.8 FL (ref 37–54)
DEPRECATED RDW RBC AUTO: 50.3 FL (ref 37–54)
DEPRECATED RDW RBC AUTO: 50.4 FL (ref 37–54)
DEPRECATED RDW RBC AUTO: 50.8 FL (ref 37–54)
DEPRECATED RDW RBC AUTO: 50.8 FL (ref 37–54)
DEPRECATED RDW RBC AUTO: 52.3 FL (ref 37–54)
DEPRECATED RDW RBC AUTO: 70.3 FL (ref 37–54)
E/E' RATIO: 15.9
E/E' RATIO: 6
EOSINOPHIL # BLD AUTO: 0 10*3/MM3 (ref 0–0.3)
EOSINOPHIL # BLD AUTO: 0 10*3/MM3 (ref 0–0.3)
EOSINOPHIL # BLD AUTO: 0.01 10*3/MM3 (ref 0–0.3)
EOSINOPHIL # BLD AUTO: 0.01 10*3/MM3 (ref 0–0.3)
EOSINOPHIL # BLD AUTO: 0.01 10*3/MM3 (ref 0–0.7)
EOSINOPHIL # BLD AUTO: 0.02 10*3/MM3 (ref 0–0.3)
EOSINOPHIL # BLD MANUAL: 0.03 10*3/MM3 (ref 0–0.7)
EOSINOPHIL NFR BLD AUTO: 0 % (ref 0–3)
EOSINOPHIL NFR BLD AUTO: 0 % (ref 0–3)
EOSINOPHIL NFR BLD AUTO: 0.1 % (ref 0–3)
EOSINOPHIL NFR BLD AUTO: 0.2 % (ref 0–3)
EOSINOPHIL NFR BLD AUTO: 0.3 % (ref 0–3)
EOSINOPHIL NFR BLD AUTO: 0.3 % (ref 0–7)
EOSINOPHIL NFR BLD MANUAL: 1 % (ref 0–7)
ERYTHROCYTE [DISTWIDTH] IN BLOOD BY AUTOMATED COUNT: 17.6 % (ref 11.3–14.5)
ERYTHROCYTE [DISTWIDTH] IN BLOOD BY AUTOMATED COUNT: 17.7 % (ref 11.3–14.5)
ERYTHROCYTE [DISTWIDTH] IN BLOOD BY AUTOMATED COUNT: 17.9 % (ref 11.3–14.5)
ERYTHROCYTE [DISTWIDTH] IN BLOOD BY AUTOMATED COUNT: 17.9 % (ref 11.5–14.5)
ERYTHROCYTE [DISTWIDTH] IN BLOOD BY AUTOMATED COUNT: 18 % (ref 11.3–14.5)
ERYTHROCYTE [DISTWIDTH] IN BLOOD BY AUTOMATED COUNT: 18.1 % (ref 11.3–14.5)
ERYTHROCYTE [DISTWIDTH] IN BLOOD BY AUTOMATED COUNT: 18.2 % (ref 11.3–14.5)
ERYTHROCYTE [DISTWIDTH] IN BLOOD BY AUTOMATED COUNT: 18.2 % (ref 11.5–14.5)
ERYTHROCYTE [DISTWIDTH] IN BLOOD BY AUTOMATED COUNT: 18.5 % (ref 11.5–14.5)
ERYTHROCYTE [DISTWIDTH] IN BLOOD BY AUTOMATED COUNT: 22.7 % (ref 11.3–14.5)
GFR SERPL CREATININE-BSD FRML MDRD: 33 ML/MIN/1.73
GFR SERPL CREATININE-BSD FRML MDRD: 47 ML/MIN/1.73
GFR SERPL CREATININE-BSD FRML MDRD: 47 ML/MIN/1.73
GFR SERPL CREATININE-BSD FRML MDRD: 51 ML/MIN/1.73
GFR SERPL CREATININE-BSD FRML MDRD: 55 ML/MIN/1.73
GFR SERPL CREATININE-BSD FRML MDRD: 55 ML/MIN/1.73
GFR SERPL CREATININE-BSD FRML MDRD: 60 ML/MIN/1.73
GFR SERPL CREATININE-BSD FRML MDRD: 67 ML/MIN/1.73
GFR SERPL CREATININE-BSD FRML MDRD: 75 ML/MIN/1.73
GFR SERPL CREATININE-BSD FRML MDRD: 75 ML/MIN/1.73
GFR SERPL CREATININE-BSD FRML MDRD: 84 ML/MIN/1.73
GFR SERPL CREATININE-BSD FRML MDRD: 84 ML/MIN/1.73
GLOBULIN UR ELPH-MCNC: 3.4 GM/DL
GLOBULIN UR ELPH-MCNC: 3.8 GM/DL
GLOBULIN UR ELPH-MCNC: 3.9 GM/DL
GLOBULIN UR ELPH-MCNC: 4.1 GM/DL
GLOBULIN UR ELPH-MCNC: 4.2 GM/DL
GLOBULIN UR ELPH-MCNC: 4.2 GM/DL
GLUCOSE BLD-MCNC: 141 MG/DL (ref 74–98)
GLUCOSE BLD-MCNC: 156 MG/DL (ref 70–100)
GLUCOSE BLD-MCNC: 156 MG/DL (ref 74–98)
GLUCOSE BLD-MCNC: 159 MG/DL (ref 70–100)
GLUCOSE BLD-MCNC: 162 MG/DL (ref 70–100)
GLUCOSE BLD-MCNC: 164 MG/DL (ref 74–98)
GLUCOSE BLD-MCNC: 174 MG/DL (ref 70–100)
GLUCOSE BLD-MCNC: 180 MG/DL (ref 70–100)
GLUCOSE BLD-MCNC: 200 MG/DL (ref 70–100)
GLUCOSE BLD-MCNC: 203 MG/DL (ref 70–100)
GLUCOSE BLD-MCNC: 228 MG/DL (ref 70–100)
GLUCOSE BLD-MCNC: 242 MG/DL (ref 70–100)
GLUCOSE BLD-MCNC: 257 MG/DL (ref 70–100)
GLUCOSE BLD-MCNC: 267 MG/DL (ref 70–100)
GLUCOSE BLD-MCNC: 506 MG/DL (ref 70–100)
GLUCOSE BLDC GLUCOMTR-MCNC: 127 MG/DL (ref 70–130)
GLUCOSE BLDC GLUCOMTR-MCNC: 135 MG/DL (ref 70–130)
GLUCOSE BLDC GLUCOMTR-MCNC: 141 MG/DL (ref 70–130)
GLUCOSE BLDC GLUCOMTR-MCNC: 143 MG/DL (ref 70–130)
GLUCOSE BLDC GLUCOMTR-MCNC: 152 MG/DL (ref 70–130)
GLUCOSE BLDC GLUCOMTR-MCNC: 157 MG/DL (ref 70–130)
GLUCOSE BLDC GLUCOMTR-MCNC: 166 MG/DL (ref 70–130)
GLUCOSE BLDC GLUCOMTR-MCNC: 167 MG/DL (ref 70–130)
GLUCOSE BLDC GLUCOMTR-MCNC: 180 MG/DL (ref 70–130)
GLUCOSE BLDC GLUCOMTR-MCNC: 182 MG/DL (ref 70–130)
GLUCOSE BLDC GLUCOMTR-MCNC: 183 MG/DL (ref 70–130)
GLUCOSE BLDC GLUCOMTR-MCNC: 194 MG/DL (ref 70–130)
GLUCOSE BLDC GLUCOMTR-MCNC: 195 MG/DL (ref 70–130)
GLUCOSE BLDC GLUCOMTR-MCNC: 196 MG/DL (ref 70–130)
GLUCOSE BLDC GLUCOMTR-MCNC: 200 MG/DL (ref 70–130)
GLUCOSE BLDC GLUCOMTR-MCNC: 202 MG/DL (ref 70–130)
GLUCOSE BLDC GLUCOMTR-MCNC: 212 MG/DL (ref 70–130)
GLUCOSE BLDC GLUCOMTR-MCNC: 215 MG/DL (ref 70–130)
GLUCOSE BLDC GLUCOMTR-MCNC: 220 MG/DL (ref 70–130)
GLUCOSE BLDC GLUCOMTR-MCNC: 230 MG/DL (ref 70–130)
GLUCOSE BLDC GLUCOMTR-MCNC: 236 MG/DL (ref 70–130)
GLUCOSE BLDC GLUCOMTR-MCNC: 242 MG/DL (ref 70–130)
GLUCOSE BLDC GLUCOMTR-MCNC: 248 MG/DL (ref 70–130)
GLUCOSE BLDC GLUCOMTR-MCNC: 249 MG/DL (ref 70–130)
GLUCOSE BLDC GLUCOMTR-MCNC: 263 MG/DL (ref 70–130)
GLUCOSE BLDC GLUCOMTR-MCNC: 264 MG/DL (ref 70–130)
GLUCOSE BLDC GLUCOMTR-MCNC: 265 MG/DL (ref 70–130)
GLUCOSE BLDC GLUCOMTR-MCNC: 266 MG/DL (ref 70–130)
GLUCOSE BLDC GLUCOMTR-MCNC: 281 MG/DL (ref 70–130)
GLUCOSE BLDC GLUCOMTR-MCNC: 295 MG/DL (ref 70–130)
GLUCOSE BLDC GLUCOMTR-MCNC: 306 MG/DL (ref 70–130)
GLUCOSE BLDC GLUCOMTR-MCNC: 307 MG/DL (ref 70–130)
GLUCOSE BLDC GLUCOMTR-MCNC: 325 MG/DL (ref 70–130)
GLUCOSE BLDC GLUCOMTR-MCNC: 431 MG/DL (ref 70–130)
GLUCOSE UR STRIP-MCNC: ABNORMAL MG/DL
GLUCOSE UR STRIP-MCNC: ABNORMAL MG/DL
GLUCOSE UR STRIP-MCNC: NEGATIVE MG/DL
GRAM STN SPEC: ABNORMAL
HBA1C MFR BLD: 8.4 % (ref 4.8–5.6)
HCO3 BLDA-SCNC: 26.4 MMOL/L (ref 22–28)
HCT VFR BLD AUTO: 32.1 % (ref 38.9–50.9)
HCT VFR BLD AUTO: 33.6 % (ref 38.9–50.9)
HCT VFR BLD AUTO: 36 % (ref 38.9–50.9)
HCT VFR BLD AUTO: 36.8 % (ref 38.9–50.9)
HCT VFR BLD AUTO: 38 % (ref 42–52)
HCT VFR BLD AUTO: 38.3 % (ref 38.9–50.9)
HCT VFR BLD AUTO: 38.6 % (ref 38.9–50.9)
HCT VFR BLD AUTO: 38.7 % (ref 38.9–50.9)
HCT VFR BLD AUTO: 40.4 % (ref 42–52)
HCT VFR BLD AUTO: 41.3 % (ref 42–52)
HGB BLD-MCNC: 10.5 G/DL (ref 13.1–17.5)
HGB BLD-MCNC: 10.5 G/DL (ref 13.1–17.5)
HGB BLD-MCNC: 10.8 G/DL (ref 13.1–17.5)
HGB BLD-MCNC: 11.1 G/DL (ref 13.1–17.5)
HGB BLD-MCNC: 11.1 G/DL (ref 14–18)
HGB BLD-MCNC: 11.4 G/DL (ref 13.1–17.5)
HGB BLD-MCNC: 11.7 G/DL (ref 14–18)
HGB BLD-MCNC: 12 G/DL (ref 14–18)
HGB BLD-MCNC: 9.1 G/DL (ref 13.1–17.5)
HGB BLD-MCNC: 9.7 G/DL (ref 13.1–17.5)
HGB BLDA-MCNC: 11.5 G/DL (ref 12–18)
HGB UR QL STRIP.AUTO: ABNORMAL
HGB UR QL STRIP.AUTO: NEGATIVE
HGB UR QL STRIP.AUTO: NEGATIVE
HOLD SPECIMEN: NORMAL
HOROWITZ INDEX BLD+IHG-RTO: 21 %
HYALINE CASTS UR QL AUTO: ABNORMAL /LPF
HYALINE CASTS UR QL AUTO: NORMAL /LPF
HYPOCHROMIA BLD QL: NORMAL
IMM GRANULOCYTES # BLD: 0.01 10*3/MM3 (ref 0–0.03)
IMM GRANULOCYTES # BLD: 0.02 10*3/MM3 (ref 0–0.03)
IMM GRANULOCYTES # BLD: 0.02 10*3/MM3 (ref 0–0.03)
IMM GRANULOCYTES # BLD: 0.02 10*3/MM3 (ref 0–0.06)
IMM GRANULOCYTES # BLD: 0.03 10*3/MM3 (ref 0–0.03)
IMM GRANULOCYTES # BLD: 0.04 10*3/MM3 (ref 0–0.03)
IMM GRANULOCYTES NFR BLD: 0.2 % (ref 0–0.6)
IMM GRANULOCYTES NFR BLD: 0.2 % (ref 0–0.6)
IMM GRANULOCYTES NFR BLD: 0.3 % (ref 0–0.6)
IMM GRANULOCYTES NFR BLD: 0.3 % (ref 0–0.6)
IMM GRANULOCYTES NFR BLD: 0.4 % (ref 0–0.6)
IMM GRANULOCYTES NFR BLD: 0.5 % (ref 0–0.6)
IRON 24H UR-MRATE: 19 MCG/DL (ref 50–175)
IRON 24H UR-MRATE: 33 MCG/DL (ref 50–175)
IRON SATN MFR SERPL: 6 % (ref 20–50)
IRON SATN MFR SERPL: 8 % (ref 20–50)
ISOLATED FROM: ABNORMAL
ISOLATED FROM: ABNORMAL
KETONES UR QL STRIP: NEGATIVE
LARGE PLATELETS: NORMAL
LEFT ATRIUM VOLUME INDEX: 44.9 ML/M2
LEFT ATRIUM VOLUME: 101 CM3
LEUKOCYTE ESTERASE UR QL STRIP.AUTO: NEGATIVE
LIPASE SERPL-CCNC: 37 U/L (ref 6–51)
LIPASE SERPL-CCNC: 43 U/L (ref 6–51)
LIPASE SERPL-CCNC: 74 U/L (ref 6–51)
LV EF 2D ECHO EST: 40 %
LV EF 2D ECHO EST: 50 %
LYMPHOCYTES # BLD AUTO: 1.05 10*3/MM3 (ref 0.6–3.4)
LYMPHOCYTES # BLD AUTO: 1.07 10*3/MM3 (ref 0.6–4.8)
LYMPHOCYTES # BLD AUTO: 1.14 10*3/MM3 (ref 0.6–4.8)
LYMPHOCYTES # BLD AUTO: 1.24 10*3/MM3 (ref 0.6–4.8)
LYMPHOCYTES # BLD AUTO: 1.27 10*3/MM3 (ref 0.6–4.8)
LYMPHOCYTES # BLD AUTO: 1.91 10*3/MM3 (ref 0.6–4.8)
LYMPHOCYTES # BLD MANUAL: 1.2 10*3/MM3 (ref 0.6–3.4)
LYMPHOCYTES NFR BLD AUTO: 10.1 % (ref 24–44)
LYMPHOCYTES NFR BLD AUTO: 11.6 % (ref 24–44)
LYMPHOCYTES NFR BLD AUTO: 15.3 % (ref 24–44)
LYMPHOCYTES NFR BLD AUTO: 17.3 % (ref 24–44)
LYMPHOCYTES NFR BLD AUTO: 27.9 % (ref 24–44)
LYMPHOCYTES NFR BLD AUTO: 28.6 % (ref 10–50)
LYMPHOCYTES NFR BLD MANUAL: 11 % (ref 0–12)
LYMPHOCYTES NFR BLD MANUAL: 39 % (ref 10–50)
MAGNESIUM SERPL-MCNC: 1.1 MG/DL (ref 1.3–2.7)
MAGNESIUM SERPL-MCNC: 1.2 MG/DL (ref 1.3–2.7)
MAGNESIUM SERPL-MCNC: 1.6 MG/DL (ref 1.3–2.7)
MAGNESIUM SERPL-MCNC: 1.7 MG/DL (ref 1.6–2.3)
MAGNESIUM SERPL-MCNC: 1.7 MG/DL (ref 1.6–2.3)
MCH RBC QN AUTO: 21.9 PG (ref 27–31)
MCH RBC QN AUTO: 22.1 PG (ref 27–31)
MCH RBC QN AUTO: 22.1 PG (ref 27–31)
MCH RBC QN AUTO: 22.2 PG (ref 27–31)
MCH RBC QN AUTO: 22.4 PG (ref 27–31)
MCH RBC QN AUTO: 22.5 PG (ref 27–31)
MCH RBC QN AUTO: 22.5 PG (ref 27–31)
MCH RBC QN AUTO: 24 PG (ref 27–31)
MCHC RBC AUTO-ENTMCNC: 27.9 G/DL (ref 32–36)
MCHC RBC AUTO-ENTMCNC: 28.3 G/DL (ref 32–36)
MCHC RBC AUTO-ENTMCNC: 28.5 G/DL (ref 32–36)
MCHC RBC AUTO-ENTMCNC: 28.8 G/DL (ref 32–36)
MCHC RBC AUTO-ENTMCNC: 28.9 G/DL (ref 32–36)
MCHC RBC AUTO-ENTMCNC: 29 G/DL (ref 30–37)
MCHC RBC AUTO-ENTMCNC: 29.1 G/DL (ref 30–37)
MCHC RBC AUTO-ENTMCNC: 29.2 G/DL (ref 30–37)
MCHC RBC AUTO-ENTMCNC: 29.2 G/DL (ref 32–36)
MCHC RBC AUTO-ENTMCNC: 29.8 G/DL (ref 32–36)
MCV RBC AUTO: 74.4 FL (ref 80–99)
MCV RBC AUTO: 75 FL (ref 80–94)
MCV RBC AUTO: 75.9 FL (ref 80–94)
MCV RBC AUTO: 76.3 FL (ref 80–99)
MCV RBC AUTO: 77.4 FL (ref 80–94)
MCV RBC AUTO: 77.4 FL (ref 80–99)
MCV RBC AUTO: 77.8 FL (ref 80–99)
MCV RBC AUTO: 78 FL (ref 80–99)
MCV RBC AUTO: 79.3 FL (ref 80–99)
MCV RBC AUTO: 86 FL (ref 80–99)
METHGB BLD QL: 0.7 %
MICROCYTES BLD QL: ABNORMAL
MICROCYTES BLD QL: NORMAL
MICROCYTES BLD QL: NORMAL
MODALITY: ABNORMAL
MONOCYTES # BLD AUTO: 0.34 10*3/MM3 (ref 0–0.9)
MONOCYTES # BLD AUTO: 0.46 10*3/MM3 (ref 0–1)
MONOCYTES # BLD AUTO: 0.68 10*3/MM3 (ref 0–1)
MONOCYTES # BLD AUTO: 0.73 10*3/MM3 (ref 0–0.9)
MONOCYTES # BLD AUTO: 0.84 10*3/MM3 (ref 0–1)
MONOCYTES # BLD AUTO: 1.12 10*3/MM3 (ref 0–1)
MONOCYTES # BLD AUTO: 1.26 10*3/MM3 (ref 0–1)
MONOCYTES NFR BLD AUTO: 10.2 % (ref 0–12)
MONOCYTES NFR BLD AUTO: 10.3 % (ref 0–12)
MONOCYTES NFR BLD AUTO: 11.5 % (ref 0–12)
MONOCYTES NFR BLD AUTO: 19.9 % (ref 0–12)
MONOCYTES NFR BLD AUTO: 7.9 % (ref 0–12)
MONOCYTES NFR BLD AUTO: 9.1 % (ref 0–12)
NEUTROPHILS # BLD AUTO: 1.5 10*3/MM3 (ref 2–6.9)
NEUTROPHILS # BLD AUTO: 1.85 10*3/MM3 (ref 2–6.9)
NEUTROPHILS # BLD AUTO: 2.69 10*3/MM3 (ref 1.5–8.3)
NEUTROPHILS # BLD AUTO: 5.55 10*3/MM3 (ref 1.5–8.3)
NEUTROPHILS # BLD AUTO: 7.91 10*3/MM3 (ref 1.5–8.3)
NEUTROPHILS # BLD AUTO: 8.37 10*3/MM3 (ref 1.5–8.3)
NEUTROPHILS # BLD AUTO: 8.65 10*3/MM3 (ref 1.5–8.3)
NEUTROPHILS NFR BLD AUTO: 50.4 % (ref 37–80)
NEUTROPHILS NFR BLD AUTO: 60.5 % (ref 41–71)
NEUTROPHILS NFR BLD AUTO: 71.6 % (ref 41–71)
NEUTROPHILS NFR BLD AUTO: 74.3 % (ref 41–71)
NEUTROPHILS NFR BLD AUTO: 76.2 % (ref 41–71)
NEUTROPHILS NFR BLD AUTO: 81.6 % (ref 41–71)
NEUTROPHILS NFR BLD MANUAL: 32 % (ref 37–80)
NEUTS BAND NFR BLD MANUAL: 17 % (ref 0–6)
NITRITE UR QL STRIP: NEGATIVE
NRBC BLD MANUAL-RTO: 0 /100 WBC (ref 0–0)
OSMOLALITY SERPL: 288 MOSM/KG (ref 275–295)
OSMOLALITY UR: 433 MOSM/KG (ref 300–1100)
OXYHGB MFR BLDV: 93.1 % (ref 94–99)
PCO2 BLDA: 30.7 MM HG (ref 35–45)
PH BLDA: 7.54 PH UNITS
PH UR STRIP.AUTO: 5.5 [PH] (ref 5–8)
PLAT MORPH BLD: NORMAL
PLATELET # BLD AUTO: 129 10*3/MM3 (ref 150–450)
PLATELET # BLD AUTO: 138 10*3/MM3 (ref 130–400)
PLATELET # BLD AUTO: 138 10*3/MM3 (ref 130–400)
PLATELET # BLD AUTO: 154 10*3/MM3 (ref 130–400)
PLATELET # BLD AUTO: 156 10*3/MM3 (ref 150–450)
PLATELET # BLD AUTO: 163 10*3/MM3 (ref 150–450)
PLATELET # BLD AUTO: 165 10*3/MM3 (ref 150–450)
PLATELET # BLD AUTO: 173 10*3/MM3 (ref 150–450)
PLATELET # BLD AUTO: 174 10*3/MM3 (ref 150–450)
PLATELET # BLD AUTO: 187 10*3/MM3 (ref 150–450)
PMV BLD AUTO: 10 FL (ref 6–12)
PMV BLD AUTO: 10.3 FL (ref 6–12)
PMV BLD AUTO: 10.7 FL (ref 6–12)
PMV BLD AUTO: 8.9 FL (ref 6–12)
PMV BLD AUTO: 9.1 FL (ref 6–12)
PMV BLD AUTO: 9.4 FL (ref 6–12)
PMV BLD AUTO: 9.6 FL (ref 6–12)
PMV BLD AUTO: 9.8 FL (ref 6–12)
PO2 BLDA: 71.3 MM HG (ref 75–100)
POTASSIUM BLD-SCNC: 3.7 MMOL/L (ref 3.5–5.1)
POTASSIUM BLD-SCNC: 3.7 MMOL/L (ref 3.5–5.5)
POTASSIUM BLD-SCNC: 3.7 MMOL/L (ref 3.5–5.5)
POTASSIUM BLD-SCNC: 3.9 MMOL/L (ref 3.5–5.5)
POTASSIUM BLD-SCNC: 4 MMOL/L (ref 3.5–5.5)
POTASSIUM BLD-SCNC: 4 MMOL/L (ref 3.5–5.5)
POTASSIUM BLD-SCNC: 4.1 MMOL/L (ref 3.5–5.1)
POTASSIUM BLD-SCNC: 4.3 MMOL/L (ref 3.5–5.5)
POTASSIUM BLD-SCNC: 4.3 MMOL/L (ref 3.5–5.5)
POTASSIUM BLD-SCNC: 4.7 MMOL/L (ref 3.5–5.5)
POTASSIUM BLD-SCNC: 4.8 MMOL/L (ref 3.5–5.5)
POTASSIUM BLD-SCNC: 4.8 MMOL/L (ref 3.5–5.5)
POTASSIUM BLD-SCNC: 4.9 MMOL/L (ref 3.5–5.1)
POTASSIUM BLD-SCNC: 5.1 MMOL/L (ref 3.5–5.5)
POTASSIUM BLD-SCNC: 5.2 MMOL/L (ref 3.5–5.5)
PROCALCITONIN SERPL-MCNC: 0.06 NG/ML
PROCALCITONIN SERPL-MCNC: <0.05 NG/ML
PROT SERPL-MCNC: 7.4 G/DL (ref 5.7–8.2)
PROT SERPL-MCNC: 7.7 G/DL (ref 5.7–8.2)
PROT SERPL-MCNC: 7.7 G/DL (ref 5.7–8.2)
PROT SERPL-MCNC: 8.1 G/DL (ref 5.7–8.2)
PROT SERPL-MCNC: 8.1 G/DL (ref 5.7–8.2)
PROT SERPL-MCNC: 8.7 G/DL (ref 5.7–8.2)
PROT UR QL STRIP: ABNORMAL
RBC # BLD AUTO: 4.05 10*6/MM3 (ref 4.2–5.76)
RBC # BLD AUTO: 4.31 10*6/MM3 (ref 4.2–5.76)
RBC # BLD AUTO: 4.5 10*6/MM3 (ref 4.2–5.76)
RBC # BLD AUTO: 4.72 10*6/MM3 (ref 4.2–5.76)
RBC # BLD AUTO: 4.73 10*6/MM3 (ref 4.2–5.76)
RBC # BLD AUTO: 4.99 10*6/MM3 (ref 4.2–5.76)
RBC # BLD AUTO: 5.07 10*6/MM3 (ref 4.7–6.1)
RBC # BLD AUTO: 5.15 10*6/MM3 (ref 4.2–5.76)
RBC # BLD AUTO: 5.22 10*6/MM3 (ref 4.7–6.1)
RBC # BLD AUTO: 5.44 10*6/MM3 (ref 4.7–6.1)
RBC # UR: ABNORMAL /HPF
RBC # UR: NORMAL /HPF
REF LAB TEST METHOD: ABNORMAL
REF LAB TEST METHOD: NORMAL
SAO2 % BLDCOA: 96.5 %
SCAN SLIDE: NORMAL
SMALL PLATELETS BLD QL SMEAR: ADEQUATE
SMALL PLATELETS BLD QL SMEAR: ADEQUATE
SODIUM BLD-SCNC: 130 MMOL/L (ref 132–146)
SODIUM BLD-SCNC: 130 MMOL/L (ref 132–146)
SODIUM BLD-SCNC: 131 MMOL/L (ref 132–146)
SODIUM BLD-SCNC: 133 MMOL/L (ref 132–146)
SODIUM BLD-SCNC: 134 MMOL/L (ref 132–146)
SODIUM BLD-SCNC: 135 MMOL/L (ref 132–146)
SODIUM BLD-SCNC: 136 MMOL/L (ref 132–146)
SODIUM BLD-SCNC: 138 MMOL/L (ref 132–146)
SODIUM BLD-SCNC: 139 MMOL/L (ref 132–146)
SODIUM BLD-SCNC: 139 MMOL/L (ref 137–145)
SODIUM BLD-SCNC: 140 MMOL/L (ref 132–146)
SODIUM BLD-SCNC: 140 MMOL/L (ref 137–145)
SODIUM BLD-SCNC: 141 MMOL/L (ref 132–146)
SODIUM BLD-SCNC: 141 MMOL/L (ref 132–146)
SODIUM BLD-SCNC: 142 MMOL/L (ref 137–145)
SODIUM UR-SCNC: 77 MMOL/L (ref 30–90)
SP GR UR STRIP: 1.01 (ref 1–1.03)
SP GR UR STRIP: 1.01 (ref 1–1.03)
SP GR UR STRIP: 1.02 (ref 1–1.03)
SQUAMOUS #/AREA URNS HPF: ABNORMAL /HPF
SQUAMOUS #/AREA URNS HPF: NORMAL /HPF
TIBC SERPL-MCNC: 334 MCG/DL (ref 250–450)
TIBC SERPL-MCNC: 412 MCG/DL (ref 250–450)
TROPONIN I SERPL-MCNC: 0.03 NG/ML (ref 0–0.07)
TROPONIN I SERPL-MCNC: 0.05 NG/ML
TROPONIN I SERPL-MCNC: 0.07 NG/ML
TROPONIN I SERPL-MCNC: 0.07 NG/ML
TSH SERPL DL<=0.05 MIU/L-ACNC: 2.43 MIU/ML (ref 0.47–4.68)
UROBILINOGEN UR QL STRIP: ABNORMAL
VIT B12 BLD-MCNC: 443 PG/ML (ref 239–931)
WBC MORPH BLD: NORMAL
WBC NRBC COR # BLD: 10.6 10*3/MM3 (ref 3.5–10.8)
WBC NRBC COR # BLD: 10.97 10*3/MM3 (ref 3.5–10.8)
WBC NRBC COR # BLD: 11.03 10*3/MM3 (ref 3.5–10.8)
WBC NRBC COR # BLD: 13.49 10*3/MM3 (ref 3.5–10.8)
WBC NRBC COR # BLD: 3.07 10*3/MM3 (ref 4.8–10.8)
WBC NRBC COR # BLD: 3.67 10*3/MM3 (ref 4.8–10.8)
WBC NRBC COR # BLD: 4.45 10*3/MM3 (ref 3.5–10.8)
WBC NRBC COR # BLD: 4.6 10*3/MM3 (ref 4.8–10.8)
WBC NRBC COR # BLD: 6.83 10*3/MM3 (ref 3.5–10.8)
WBC NRBC COR # BLD: 7.46 10*3/MM3 (ref 3.5–10.8)
WBC UR QL AUTO: ABNORMAL /HPF
WBC UR QL AUTO: NORMAL /HPF
WHOLE BLOOD HOLD SPECIMEN: NORMAL

## 2017-01-01 PROCEDURE — 80048 BASIC METABOLIC PNL TOTAL CA: CPT | Performed by: INTERNAL MEDICINE

## 2017-01-01 PROCEDURE — 94660 CPAP INITIATION&MGMT: CPT

## 2017-01-01 PROCEDURE — 94640 AIRWAY INHALATION TREATMENT: CPT

## 2017-01-01 PROCEDURE — 84484 ASSAY OF TROPONIN QUANT: CPT | Performed by: FAMILY MEDICINE

## 2017-01-01 PROCEDURE — 94799 UNLISTED PULMONARY SVC/PX: CPT

## 2017-01-01 PROCEDURE — 80048 BASIC METABOLIC PNL TOTAL CA: CPT | Performed by: NURSE PRACTITIONER

## 2017-01-01 PROCEDURE — 99233 SBSQ HOSP IP/OBS HIGH 50: CPT | Performed by: SURGERY

## 2017-01-01 PROCEDURE — 63710000001 INSULIN ASPART PER 5 UNITS: Performed by: INTERNAL MEDICINE

## 2017-01-01 PROCEDURE — 0D9670Z DRAINAGE OF STOMACH WITH DRAINAGE DEVICE, VIA NATURAL OR ARTIFICIAL OPENING: ICD-10-PCS | Performed by: INTERNAL MEDICINE

## 2017-01-01 PROCEDURE — 96365 THER/PROPH/DIAG IV INF INIT: CPT

## 2017-01-01 PROCEDURE — 72146 MRI CHEST SPINE W/O DYE: CPT

## 2017-01-01 PROCEDURE — 25010000002 HEPARIN (PORCINE) PER 1000 UNITS: Performed by: FAMILY MEDICINE

## 2017-01-01 PROCEDURE — 80053 COMPREHEN METABOLIC PANEL: CPT | Performed by: FAMILY MEDICINE

## 2017-01-01 PROCEDURE — 85025 COMPLETE CBC W/AUTO DIFF WBC: CPT | Performed by: EMERGENCY MEDICINE

## 2017-01-01 PROCEDURE — 96361 HYDRATE IV INFUSION ADD-ON: CPT

## 2017-01-01 PROCEDURE — 25010000003 CEFAZOLIN IN DEXTROSE 2-4 GM/100ML-% SOLUTION: Performed by: INTERNAL MEDICINE

## 2017-01-01 PROCEDURE — 99222 1ST HOSP IP/OBS MODERATE 55: CPT | Performed by: SURGERY

## 2017-01-01 PROCEDURE — 85025 COMPLETE CBC W/AUTO DIFF WBC: CPT | Performed by: INTERNAL MEDICINE

## 2017-01-01 PROCEDURE — 25010000002 HYDROMORPHONE PER 4 MG: Performed by: FAMILY MEDICINE

## 2017-01-01 PROCEDURE — 87040 BLOOD CULTURE FOR BACTERIA: CPT | Performed by: INTERNAL MEDICINE

## 2017-01-01 PROCEDURE — 83050 HGB METHEMOGLOBIN QUAN: CPT

## 2017-01-01 PROCEDURE — 84145 PROCALCITONIN (PCT): CPT | Performed by: FAMILY MEDICINE

## 2017-01-01 PROCEDURE — 87185 SC STD ENZYME DETCJ PER NZM: CPT | Performed by: EMERGENCY MEDICINE

## 2017-01-01 PROCEDURE — 63710000001 INSULIN DETEMIR PER 5 UNITS: Performed by: INTERNAL MEDICINE

## 2017-01-01 PROCEDURE — 74176 CT ABD & PELVIS W/O CONTRAST: CPT

## 2017-01-01 PROCEDURE — 85027 COMPLETE CBC AUTOMATED: CPT | Performed by: INTERNAL MEDICINE

## 2017-01-01 PROCEDURE — 99284 EMERGENCY DEPT VISIT MOD MDM: CPT

## 2017-01-01 PROCEDURE — 36415 COLL VENOUS BLD VENIPUNCTURE: CPT

## 2017-01-01 PROCEDURE — 81001 URINALYSIS AUTO W/SCOPE: CPT | Performed by: EMERGENCY MEDICINE

## 2017-01-01 PROCEDURE — 99218 PR INITIAL OBSERVATION CARE/DAY 30 MINUTES: CPT | Performed by: INTERNAL MEDICINE

## 2017-01-01 PROCEDURE — 25010000002 NA FERRIC GLUC CPLX PER 12.5 MG: Performed by: INTERNAL MEDICINE

## 2017-01-01 PROCEDURE — 82962 GLUCOSE BLOOD TEST: CPT

## 2017-01-01 PROCEDURE — 99406 BEHAV CHNG SMOKING 3-10 MIN: CPT | Performed by: NURSE PRACTITIONER

## 2017-01-01 PROCEDURE — 80053 COMPREHEN METABOLIC PANEL: CPT | Performed by: EMERGENCY MEDICINE

## 2017-01-01 PROCEDURE — C1894 INTRO/SHEATH, NON-LASER: HCPCS

## 2017-01-01 PROCEDURE — 99233 SBSQ HOSP IP/OBS HIGH 50: CPT | Performed by: PSYCHIATRY & NEUROLOGY

## 2017-01-01 PROCEDURE — 99232 SBSQ HOSP IP/OBS MODERATE 35: CPT | Performed by: INTERNAL MEDICINE

## 2017-01-01 PROCEDURE — 93010 ELECTROCARDIOGRAM REPORT: CPT | Performed by: INTERNAL MEDICINE

## 2017-01-01 PROCEDURE — 83690 ASSAY OF LIPASE: CPT | Performed by: FAMILY MEDICINE

## 2017-01-01 PROCEDURE — 83036 HEMOGLOBIN GLYCOSYLATED A1C: CPT | Performed by: FAMILY MEDICINE

## 2017-01-01 PROCEDURE — 93306 TTE W/DOPPLER COMPLETE: CPT | Performed by: INTERNAL MEDICINE

## 2017-01-01 PROCEDURE — 99239 HOSP IP/OBS DSCHRG MGMT >30: CPT | Performed by: NURSE PRACTITIONER

## 2017-01-01 PROCEDURE — 99214 OFFICE O/P EST MOD 30 MIN: CPT | Performed by: NURSE PRACTITIONER

## 2017-01-01 PROCEDURE — 71010 HC CHEST PA OR AP: CPT

## 2017-01-01 PROCEDURE — 85027 COMPLETE CBC AUTOMATED: CPT | Performed by: FAMILY MEDICINE

## 2017-01-01 PROCEDURE — 25010000002 ENOXAPARIN PER 10 MG: Performed by: INTERNAL MEDICINE

## 2017-01-01 PROCEDURE — 63710000001 INSULIN LISPRO (HUMAN) PER 5 UNITS

## 2017-01-01 PROCEDURE — 94760 N-INVAS EAR/PLS OXIMETRY 1: CPT

## 2017-01-01 PROCEDURE — 25010000003 CEFAZOLIN IN DEXTROSE 2-4 GM/100ML-% SOLUTION: Performed by: EMERGENCY MEDICINE

## 2017-01-01 PROCEDURE — 71275 CT ANGIOGRAPHY CHEST: CPT

## 2017-01-01 PROCEDURE — 80053 COMPREHEN METABOLIC PANEL: CPT | Performed by: INTERNAL MEDICINE

## 2017-01-01 PROCEDURE — 93306 TTE W/DOPPLER COMPLETE: CPT

## 2017-01-01 PROCEDURE — 25010000002 ONDANSETRON PER 1 MG: Performed by: FAMILY MEDICINE

## 2017-01-01 PROCEDURE — 25010000002 MAGNESIUM SULFATE 2 GM/50ML SOLUTION: Performed by: EMERGENCY MEDICINE

## 2017-01-01 PROCEDURE — C8929 TTE W OR WO FOL WCON,DOPPLER: HCPCS

## 2017-01-01 PROCEDURE — 83880 ASSAY OF NATRIURETIC PEPTIDE: CPT | Performed by: EMERGENCY MEDICINE

## 2017-01-01 PROCEDURE — 83605 ASSAY OF LACTIC ACID: CPT | Performed by: FAMILY MEDICINE

## 2017-01-01 PROCEDURE — 71020 HC CHEST PA AND LATERAL: CPT

## 2017-01-01 PROCEDURE — 83540 ASSAY OF IRON: CPT | Performed by: NURSE PRACTITIONER

## 2017-01-01 PROCEDURE — 84300 ASSAY OF URINE SODIUM: CPT | Performed by: NURSE PRACTITIONER

## 2017-01-01 PROCEDURE — 25010000002 HYDROMORPHONE PER 4 MG: Performed by: EMERGENCY MEDICINE

## 2017-01-01 PROCEDURE — 25010000002 SULFUR HEXAFLUORIDE MICROSPH 60.7-25 MG RECONSTITUTED SUSPENSION: Performed by: INTERNAL MEDICINE

## 2017-01-01 PROCEDURE — 99214 OFFICE O/P EST MOD 30 MIN: CPT | Performed by: INTERNAL MEDICINE

## 2017-01-01 PROCEDURE — 99214 OFFICE O/P EST MOD 30 MIN: CPT | Performed by: PSYCHIATRY & NEUROLOGY

## 2017-01-01 PROCEDURE — 63710000001 INSULIN DETEMIR PER 5 UNITS: Performed by: FAMILY MEDICINE

## 2017-01-01 PROCEDURE — 63710000001 INSULIN REGULAR HUMAN PER 5 UNITS: Performed by: FAMILY MEDICINE

## 2017-01-01 PROCEDURE — 84484 ASSAY OF TROPONIN QUANT: CPT

## 2017-01-01 PROCEDURE — 83735 ASSAY OF MAGNESIUM: CPT | Performed by: NURSE PRACTITIONER

## 2017-01-01 PROCEDURE — 87150 DNA/RNA AMPLIFIED PROBE: CPT | Performed by: EMERGENCY MEDICINE

## 2017-01-01 PROCEDURE — 84443 ASSAY THYROID STIM HORMONE: CPT | Performed by: INTERNAL MEDICINE

## 2017-01-01 PROCEDURE — 96375 TX/PRO/DX INJ NEW DRUG ADDON: CPT

## 2017-01-01 PROCEDURE — 99239 HOSP IP/OBS DSCHRG MGMT >30: CPT | Performed by: INTERNAL MEDICINE

## 2017-01-01 PROCEDURE — 99232 SBSQ HOSP IP/OBS MODERATE 35: CPT | Performed by: FAMILY MEDICINE

## 2017-01-01 PROCEDURE — 96367 TX/PROPH/DG ADDL SEQ IV INF: CPT

## 2017-01-01 PROCEDURE — 83690 ASSAY OF LIPASE: CPT | Performed by: EMERGENCY MEDICINE

## 2017-01-01 PROCEDURE — 82140 ASSAY OF AMMONIA: CPT | Performed by: INTERNAL MEDICINE

## 2017-01-01 PROCEDURE — 83930 ASSAY OF BLOOD OSMOLALITY: CPT | Performed by: NURSE PRACTITIONER

## 2017-01-01 PROCEDURE — 83935 ASSAY OF URINE OSMOLALITY: CPT | Performed by: NURSE PRACTITIONER

## 2017-01-01 PROCEDURE — 83735 ASSAY OF MAGNESIUM: CPT | Performed by: EMERGENCY MEDICINE

## 2017-01-01 PROCEDURE — 99223 1ST HOSP IP/OBS HIGH 75: CPT | Performed by: FAMILY MEDICINE

## 2017-01-01 PROCEDURE — 99233 SBSQ HOSP IP/OBS HIGH 50: CPT | Performed by: INTERNAL MEDICINE

## 2017-01-01 PROCEDURE — 83735 ASSAY OF MAGNESIUM: CPT | Performed by: FAMILY MEDICINE

## 2017-01-01 PROCEDURE — 82570 ASSAY OF URINE CREATININE: CPT | Performed by: NURSE PRACTITIONER

## 2017-01-01 PROCEDURE — 85007 BL SMEAR W/DIFF WBC COUNT: CPT | Performed by: INTERNAL MEDICINE

## 2017-01-01 PROCEDURE — 93005 ELECTROCARDIOGRAM TRACING: CPT | Performed by: EMERGENCY MEDICINE

## 2017-01-01 PROCEDURE — 83550 IRON BINDING TEST: CPT | Performed by: INTERNAL MEDICINE

## 2017-01-01 PROCEDURE — 83880 ASSAY OF NATRIURETIC PEPTIDE: CPT | Performed by: NURSE PRACTITIONER

## 2017-01-01 PROCEDURE — 82607 VITAMIN B-12: CPT | Performed by: INTERNAL MEDICINE

## 2017-01-01 PROCEDURE — 73090 X-RAY EXAM OF FOREARM: CPT

## 2017-01-01 PROCEDURE — 25010000002 ONDANSETRON PER 1 MG: Performed by: EMERGENCY MEDICINE

## 2017-01-01 PROCEDURE — 87040 BLOOD CULTURE FOR BACTERIA: CPT | Performed by: EMERGENCY MEDICINE

## 2017-01-01 PROCEDURE — 83735 ASSAY OF MAGNESIUM: CPT | Performed by: INTERNAL MEDICINE

## 2017-01-01 PROCEDURE — 74022 RADEX COMPL AQT ABD SERIES: CPT

## 2017-01-01 PROCEDURE — 81001 URINALYSIS AUTO W/SCOPE: CPT | Performed by: INTERNAL MEDICINE

## 2017-01-01 PROCEDURE — 82805 BLOOD GASES W/O2 SATURATION: CPT

## 2017-01-01 PROCEDURE — 02HV33Z INSERTION OF INFUSION DEVICE INTO SUPERIOR VENA CAVA, PERCUTANEOUS APPROACH: ICD-10-PCS | Performed by: INTERNAL MEDICINE

## 2017-01-01 PROCEDURE — 99221 1ST HOSP IP/OBS SF/LOW 40: CPT | Performed by: INTERNAL MEDICINE

## 2017-01-01 PROCEDURE — 96366 THER/PROPH/DIAG IV INF ADDON: CPT

## 2017-01-01 PROCEDURE — C1751 CATH, INF, PER/CENT/MIDLINE: HCPCS

## 2017-01-01 PROCEDURE — 83550 IRON BINDING TEST: CPT | Performed by: NURSE PRACTITIONER

## 2017-01-01 PROCEDURE — 0 DIATRIZOATE MEGLUMINE & SODIUM PER 1 ML: Performed by: INTERNAL MEDICINE

## 2017-01-01 PROCEDURE — 87077 CULTURE AEROBIC IDENTIFY: CPT | Performed by: EMERGENCY MEDICINE

## 2017-01-01 PROCEDURE — 71101 X-RAY EXAM UNILAT RIBS/CHEST: CPT

## 2017-01-01 PROCEDURE — 25010000002 ONDANSETRON PER 1 MG: Performed by: INTERNAL MEDICINE

## 2017-01-01 PROCEDURE — 25010000002 MORPHINE PER 10 MG: Performed by: INTERNAL MEDICINE

## 2017-01-01 PROCEDURE — 82330 ASSAY OF CALCIUM: CPT | Performed by: EMERGENCY MEDICINE

## 2017-01-01 PROCEDURE — 85025 COMPLETE CBC W/AUTO DIFF WBC: CPT | Performed by: FAMILY MEDICINE

## 2017-01-01 PROCEDURE — 83605 ASSAY OF LACTIC ACID: CPT | Performed by: EMERGENCY MEDICINE

## 2017-01-01 PROCEDURE — 87186 SC STD MICRODIL/AGAR DIL: CPT | Performed by: EMERGENCY MEDICINE

## 2017-01-01 PROCEDURE — 0 IOPAMIDOL PER 1 ML: Performed by: EMERGENCY MEDICINE

## 2017-01-01 PROCEDURE — 99222 1ST HOSP IP/OBS MODERATE 55: CPT | Performed by: PSYCHIATRY & NEUROLOGY

## 2017-01-01 PROCEDURE — 82375 ASSAY CARBOXYHB QUANT: CPT

## 2017-01-01 PROCEDURE — 83540 ASSAY OF IRON: CPT | Performed by: INTERNAL MEDICINE

## 2017-01-01 PROCEDURE — 25010000002 CALCIUM GLUCONATE PER 10 ML: Performed by: EMERGENCY MEDICINE

## 2017-01-01 PROCEDURE — 85007 BL SMEAR W/DIFF WBC COUNT: CPT | Performed by: FAMILY MEDICINE

## 2017-01-01 PROCEDURE — 81003 URINALYSIS AUTO W/O SCOPE: CPT | Performed by: EMERGENCY MEDICINE

## 2017-01-01 PROCEDURE — 93005 ELECTROCARDIOGRAM TRACING: CPT

## 2017-01-01 PROCEDURE — 87147 CULTURE TYPE IMMUNOLOGIC: CPT | Performed by: EMERGENCY MEDICINE

## 2017-01-01 PROCEDURE — 99285 EMERGENCY DEPT VISIT HI MDM: CPT

## 2017-01-01 RX ORDER — NICOTINE POLACRILEX 4 MG
15 LOZENGE BUCCAL
Status: DISCONTINUED | OUTPATIENT
Start: 2017-01-01 | End: 2017-01-01 | Stop reason: HOSPADM

## 2017-01-01 RX ORDER — NALOXONE HCL 0.4 MG/ML
0.4 VIAL (ML) INJECTION
Status: DISCONTINUED | OUTPATIENT
Start: 2017-01-01 | End: 2017-01-01

## 2017-01-01 RX ORDER — FAMOTIDINE 10 MG/ML
20 INJECTION, SOLUTION INTRAVENOUS ONCE
Status: COMPLETED | OUTPATIENT
Start: 2017-01-01 | End: 2017-01-01

## 2017-01-01 RX ORDER — IBUPROFEN 400 MG/1
400 TABLET ORAL EVERY 6 HOURS PRN
Status: DISCONTINUED | OUTPATIENT
Start: 2017-01-01 | End: 2017-01-01 | Stop reason: HOSPADM

## 2017-01-01 RX ORDER — PANTOPRAZOLE SODIUM 40 MG/1
40 TABLET, DELAYED RELEASE ORAL
Status: DISCONTINUED | OUTPATIENT
Start: 2017-01-01 | End: 2017-01-01 | Stop reason: HOSPADM

## 2017-01-01 RX ORDER — METOPROLOL TARTRATE 50 MG/1
50 TABLET, FILM COATED ORAL 2 TIMES DAILY
COMMUNITY
End: 2017-01-01

## 2017-01-01 RX ORDER — CLONAZEPAM 0.5 MG/1
0.5 TABLET ORAL NIGHTLY
Status: DISCONTINUED | OUTPATIENT
Start: 2017-01-01 | End: 2017-01-01 | Stop reason: HOSPADM

## 2017-01-01 RX ORDER — AMITRIPTYLINE HYDROCHLORIDE 25 MG/1
25 TABLET, FILM COATED ORAL NIGHTLY PRN
COMMUNITY

## 2017-01-01 RX ORDER — PAROXETINE HYDROCHLORIDE 20 MG/1
20 TABLET, FILM COATED ORAL EVERY MORNING
Status: DISCONTINUED | OUTPATIENT
Start: 2017-01-01 | End: 2017-01-01 | Stop reason: HOSPADM

## 2017-01-01 RX ORDER — NICOTINE 21 MG/24HR
1 PATCH, TRANSDERMAL 24 HOURS TRANSDERMAL EVERY 24 HOURS
Status: DISCONTINUED | OUTPATIENT
Start: 2017-01-01 | End: 2017-01-01 | Stop reason: HOSPADM

## 2017-01-01 RX ORDER — HYDROCODONE BITARTRATE AND ACETAMINOPHEN 10; 325 MG/1; MG/1
1 TABLET ORAL EVERY 6 HOURS PRN
Status: DISCONTINUED | OUTPATIENT
Start: 2017-01-01 | End: 2017-01-01

## 2017-01-01 RX ORDER — QUETIAPINE FUMARATE 25 MG/1
25 TABLET, FILM COATED ORAL EVERY 12 HOURS SCHEDULED
Status: DISCONTINUED | OUTPATIENT
Start: 2017-01-01 | End: 2017-01-01 | Stop reason: HOSPADM

## 2017-01-01 RX ORDER — SODIUM CHLORIDE 9 MG/ML
75 INJECTION, SOLUTION INTRAVENOUS CONTINUOUS
Status: ACTIVE | OUTPATIENT
Start: 2017-01-01 | End: 2017-01-01

## 2017-01-01 RX ORDER — BUMETANIDE 1 MG/1
1 TABLET ORAL 2 TIMES DAILY
Status: DISCONTINUED | OUTPATIENT
Start: 2017-01-01 | End: 2017-01-01

## 2017-01-01 RX ORDER — ONDANSETRON 2 MG/ML
4 INJECTION INTRAMUSCULAR; INTRAVENOUS EVERY 6 HOURS PRN
Status: DISCONTINUED | OUTPATIENT
Start: 2017-01-01 | End: 2017-01-01 | Stop reason: HOSPADM

## 2017-01-01 RX ORDER — BUMETANIDE 1 MG/1
1 TABLET ORAL 2 TIMES DAILY
Qty: 180 TABLET | Refills: 1 | Status: SHIPPED | OUTPATIENT
Start: 2017-01-01 | End: 2017-01-01 | Stop reason: SDUPTHER

## 2017-01-01 RX ORDER — POTASSIUM CHLORIDE 20 MEQ/1
20 TABLET, EXTENDED RELEASE ORAL 2 TIMES DAILY
Qty: 60 TABLET | Refills: 3 | Status: SHIPPED | OUTPATIENT
Start: 2017-01-01 | End: 2017-01-01 | Stop reason: SDUPTHER

## 2017-01-01 RX ORDER — INSULIN GLARGINE 100 [IU]/ML
60 INJECTION, SOLUTION SUBCUTANEOUS 2 TIMES DAILY
Status: ON HOLD | COMMUNITY
End: 2017-01-01

## 2017-01-01 RX ORDER — DONEPEZIL HYDROCHLORIDE 5 MG/1
5 TABLET, FILM COATED ORAL NIGHTLY
COMMUNITY

## 2017-01-01 RX ORDER — CLONAZEPAM 0.5 MG/1
0.5 TABLET ORAL NIGHTLY
COMMUNITY

## 2017-01-01 RX ORDER — ISOSORBIDE MONONITRATE 120 MG/1
TABLET, EXTENDED RELEASE ORAL DAILY
Refills: 2 | COMMUNITY
Start: 2017-01-01 | End: 2017-01-01 | Stop reason: SDUPTHER

## 2017-01-01 RX ORDER — ASPIRIN 325 MG
325 TABLET, DELAYED RELEASE (ENTERIC COATED) ORAL DAILY
Status: ON HOLD | COMMUNITY
End: 2017-01-01

## 2017-01-01 RX ORDER — IPRATROPIUM BROMIDE AND ALBUTEROL SULFATE 2.5; .5 MG/3ML; MG/3ML
3 SOLUTION RESPIRATORY (INHALATION)
Status: DISCONTINUED | OUTPATIENT
Start: 2017-01-01 | End: 2017-01-01

## 2017-01-01 RX ORDER — SODIUM CHLORIDE 9 MG/ML
10 INJECTION, SOLUTION INTRAVENOUS CONTINUOUS
Status: DISCONTINUED | OUTPATIENT
Start: 2017-01-01 | End: 2017-01-01

## 2017-01-01 RX ORDER — CEFAZOLIN SODIUM 2 G/100ML
2 INJECTION, SOLUTION INTRAVENOUS ONCE
Status: COMPLETED | OUTPATIENT
Start: 2017-01-01 | End: 2017-01-01

## 2017-01-01 RX ORDER — MAGNESIUM SULFATE HEPTAHYDRATE 40 MG/ML
2 INJECTION, SOLUTION INTRAVENOUS ONCE
Status: COMPLETED | OUTPATIENT
Start: 2017-01-01 | End: 2017-01-01

## 2017-01-01 RX ORDER — INSULIN GLARGINE 100 [IU]/ML
20 INJECTION, SOLUTION SUBCUTANEOUS 2 TIMES DAILY
Refills: 12 | Status: ON HOLD
Start: 2017-01-01 | End: 2018-01-01

## 2017-01-01 RX ORDER — ALPRAZOLAM 0.5 MG/1
1 TABLET ORAL EVERY 12 HOURS SCHEDULED
Status: DISCONTINUED | OUTPATIENT
Start: 2017-01-01 | End: 2017-01-01 | Stop reason: HOSPADM

## 2017-01-01 RX ORDER — DEXTROSE AND SODIUM CHLORIDE 5; .45 G/100ML; G/100ML
75 INJECTION, SOLUTION INTRAVENOUS CONTINUOUS
Status: DISCONTINUED | OUTPATIENT
Start: 2017-01-01 | End: 2017-01-01

## 2017-01-01 RX ORDER — DEXTROSE MONOHYDRATE 25 G/50ML
25 INJECTION, SOLUTION INTRAVENOUS
Status: DISCONTINUED | OUTPATIENT
Start: 2017-01-01 | End: 2017-01-01 | Stop reason: HOSPADM

## 2017-01-01 RX ORDER — ONDANSETRON 2 MG/ML
4 INJECTION INTRAMUSCULAR; INTRAVENOUS ONCE
Status: COMPLETED | OUTPATIENT
Start: 2017-01-01 | End: 2017-01-01

## 2017-01-01 RX ORDER — LEVOTHYROXINE SODIUM 0.05 MG/1
50 TABLET ORAL DAILY
COMMUNITY

## 2017-01-01 RX ORDER — POTASSIUM CHLORIDE 20 MEQ/1
20 TABLET, EXTENDED RELEASE ORAL 2 TIMES DAILY
Qty: 60 TABLET | Refills: 3 | Status: SHIPPED | OUTPATIENT
Start: 2017-01-01 | End: 2018-01-01 | Stop reason: DRUGHIGH

## 2017-01-01 RX ORDER — ASPIRIN 81 MG/1
81 TABLET ORAL DAILY
Status: DISCONTINUED | OUTPATIENT
Start: 2017-01-01 | End: 2017-01-01 | Stop reason: HOSPADM

## 2017-01-01 RX ORDER — TAMSULOSIN HYDROCHLORIDE 0.4 MG/1
0.4 CAPSULE ORAL DAILY
Status: DISCONTINUED | OUTPATIENT
Start: 2017-01-01 | End: 2017-01-01 | Stop reason: HOSPADM

## 2017-01-01 RX ORDER — ALPRAZOLAM 1 MG/1
1 TABLET ORAL 2 TIMES DAILY
Status: ON HOLD | COMMUNITY
End: 2017-01-01

## 2017-01-01 RX ORDER — LEVOTHYROXINE SODIUM 0.05 MG/1
50 TABLET ORAL DAILY
Status: DISCONTINUED | OUTPATIENT
Start: 2017-01-01 | End: 2017-01-01 | Stop reason: HOSPADM

## 2017-01-01 RX ORDER — NITROGLYCERIN 0.4 MG/1
0.4 TABLET SUBLINGUAL
Status: DISCONTINUED | OUTPATIENT
Start: 2017-01-01 | End: 2017-01-01 | Stop reason: HOSPADM

## 2017-01-01 RX ORDER — ONDANSETRON 4 MG/1
4 TABLET, ORALLY DISINTEGRATING ORAL EVERY 8 HOURS PRN
COMMUNITY
End: 2018-01-01

## 2017-01-01 RX ORDER — HYDROMORPHONE HYDROCHLORIDE 1 MG/ML
0.5 INJECTION, SOLUTION INTRAMUSCULAR; INTRAVENOUS; SUBCUTANEOUS EVERY 4 HOURS PRN
Status: DISCONTINUED | OUTPATIENT
Start: 2017-01-01 | End: 2017-01-01

## 2017-01-01 RX ORDER — PAROXETINE HYDROCHLORIDE 40 MG/1
40 TABLET, FILM COATED ORAL EVERY MORNING
COMMUNITY

## 2017-01-01 RX ORDER — LEVOTHYROXINE SODIUM 0.05 MG/1
50 TABLET ORAL
Status: DISCONTINUED | OUTPATIENT
Start: 2017-01-01 | End: 2017-01-01 | Stop reason: HOSPADM

## 2017-01-01 RX ORDER — SODIUM CHLORIDE 9 MG/ML
75 INJECTION, SOLUTION INTRAVENOUS CONTINUOUS
Status: DISCONTINUED | OUTPATIENT
Start: 2017-01-01 | End: 2017-01-01

## 2017-01-01 RX ORDER — SODIUM CHLORIDE 0.9 % (FLUSH) 0.9 %
10 SYRINGE (ML) INJECTION AS NEEDED
Status: DISCONTINUED | OUTPATIENT
Start: 2017-01-01 | End: 2017-01-01 | Stop reason: HOSPADM

## 2017-01-01 RX ORDER — IPRATROPIUM BROMIDE AND ALBUTEROL SULFATE 2.5; .5 MG/3ML; MG/3ML
3 SOLUTION RESPIRATORY (INHALATION)
Status: DISCONTINUED | OUTPATIENT
Start: 2017-01-01 | End: 2017-01-01 | Stop reason: HOSPADM

## 2017-01-01 RX ORDER — SODIUM CHLORIDE 9 MG/ML
125 INJECTION, SOLUTION INTRAVENOUS CONTINUOUS
Status: DISCONTINUED | OUTPATIENT
Start: 2017-01-01 | End: 2017-01-01 | Stop reason: HOSPADM

## 2017-01-01 RX ORDER — CEFAZOLIN SODIUM 2 G/100ML
2 INJECTION, SOLUTION INTRAVENOUS EVERY 8 HOURS
Qty: 100 ML
Start: 2017-01-01 | End: 2017-01-01

## 2017-01-01 RX ORDER — BUMETANIDE 2 MG/1
1 TABLET ORAL 2 TIMES DAILY
Status: DISCONTINUED | OUTPATIENT
Start: 2017-01-01 | End: 2017-01-01

## 2017-01-01 RX ORDER — IPRATROPIUM BROMIDE AND ALBUTEROL SULFATE 2.5; .5 MG/3ML; MG/3ML
3 SOLUTION RESPIRATORY (INHALATION) EVERY 6 HOURS PRN
Status: DISCONTINUED | OUTPATIENT
Start: 2017-01-01 | End: 2017-01-01 | Stop reason: HOSPADM

## 2017-01-01 RX ORDER — ROSUVASTATIN CALCIUM 20 MG/1
20 TABLET, COATED ORAL DAILY
Qty: 90 TABLET | Refills: 4 | Status: SHIPPED | OUTPATIENT
Start: 2017-01-01

## 2017-01-01 RX ORDER — BUMETANIDE 1 MG/1
0.5 TABLET ORAL 2 TIMES DAILY
Status: DISCONTINUED | OUTPATIENT
Start: 2017-01-01 | End: 2017-01-01 | Stop reason: HOSPADM

## 2017-01-01 RX ORDER — PANTOPRAZOLE SODIUM 40 MG/10ML
40 INJECTION, POWDER, LYOPHILIZED, FOR SOLUTION INTRAVENOUS EVERY 12 HOURS SCHEDULED
Status: DISCONTINUED | OUTPATIENT
Start: 2017-01-01 | End: 2017-01-01

## 2017-01-01 RX ORDER — HYDROCODONE BITARTRATE AND ACETAMINOPHEN 10; 325 MG/1; MG/1
1 TABLET ORAL EVERY 4 HOURS PRN
Status: DISCONTINUED | OUTPATIENT
Start: 2017-01-01 | End: 2017-01-01

## 2017-01-01 RX ORDER — CARVEDILOL 12.5 MG/1
12.5 TABLET ORAL 2 TIMES DAILY WITH MEALS
Qty: 180 TABLET | Refills: 3 | Status: SHIPPED | OUTPATIENT
Start: 2017-01-01 | End: 2017-01-01 | Stop reason: HOSPADM

## 2017-01-01 RX ORDER — SENNA AND DOCUSATE SODIUM 50; 8.6 MG/1; MG/1
2 TABLET, FILM COATED ORAL ONCE
Status: COMPLETED | OUTPATIENT
Start: 2017-01-01 | End: 2017-01-01

## 2017-01-01 RX ORDER — DOXYCYCLINE HYCLATE 100 MG/1
100 CAPSULE ORAL EVERY 12 HOURS SCHEDULED
Status: DISCONTINUED | OUTPATIENT
Start: 2017-01-01 | End: 2017-01-01

## 2017-01-01 RX ORDER — MAGNESIUM SULFATE HEPTAHYDRATE 40 MG/ML
2 INJECTION, SOLUTION INTRAVENOUS AS NEEDED
Status: DISCONTINUED | OUTPATIENT
Start: 2017-01-01 | End: 2017-01-01 | Stop reason: HOSPADM

## 2017-01-01 RX ORDER — AMITRIPTYLINE HYDROCHLORIDE 25 MG/1
25 TABLET, FILM COATED ORAL NIGHTLY PRN
Status: DISCONTINUED | OUTPATIENT
Start: 2017-01-01 | End: 2017-01-01 | Stop reason: HOSPADM

## 2017-01-01 RX ORDER — CLOPIDOGREL BISULFATE 75 MG/1
75 TABLET ORAL DAILY
COMMUNITY
End: 2017-01-01 | Stop reason: SDUPTHER

## 2017-01-01 RX ORDER — ATORVASTATIN CALCIUM 40 MG/1
40 TABLET, FILM COATED ORAL NIGHTLY
Status: DISCONTINUED | OUTPATIENT
Start: 2017-01-01 | End: 2017-01-01 | Stop reason: HOSPADM

## 2017-01-01 RX ORDER — ASPIRIN 81 MG/1
81 TABLET ORAL DAILY
Qty: 90 TABLET | Refills: 4 | Status: SHIPPED | OUTPATIENT
Start: 2017-01-01

## 2017-01-01 RX ORDER — SODIUM CHLORIDE 0.9 % (FLUSH) 0.9 %
1-10 SYRINGE (ML) INJECTION AS NEEDED
Status: DISCONTINUED | OUTPATIENT
Start: 2017-01-01 | End: 2017-01-01 | Stop reason: HOSPADM

## 2017-01-01 RX ORDER — CALCIUM GLUCONATE 94 MG/ML
1 INJECTION, SOLUTION INTRAVENOUS ONCE
Status: COMPLETED | OUTPATIENT
Start: 2017-01-01 | End: 2017-01-01

## 2017-01-01 RX ORDER — ISOSORBIDE MONONITRATE 120 MG/1
120 TABLET, EXTENDED RELEASE ORAL DAILY
Qty: 90 TABLET | Refills: 4 | Status: SHIPPED | OUTPATIENT
Start: 2017-01-01

## 2017-01-01 RX ORDER — HEPARIN SODIUM 5000 [USP'U]/ML
5000 INJECTION, SOLUTION INTRAVENOUS; SUBCUTANEOUS EVERY 12 HOURS SCHEDULED
Status: DISCONTINUED | OUTPATIENT
Start: 2017-01-01 | End: 2017-01-01 | Stop reason: HOSPADM

## 2017-01-01 RX ORDER — NITROGLYCERIN 0.4 MG/1
0.4 TABLET SUBLINGUAL
Qty: 25 TABLET | Refills: 5 | Status: SHIPPED | OUTPATIENT
Start: 2017-01-01

## 2017-01-01 RX ORDER — OXYCODONE HYDROCHLORIDE AND ACETAMINOPHEN 5; 325 MG/1; MG/1
1 TABLET ORAL EVERY 6 HOURS PRN
Status: DISCONTINUED | OUTPATIENT
Start: 2017-01-01 | End: 2017-01-01 | Stop reason: HOSPADM

## 2017-01-01 RX ORDER — FERROUS SULFATE 325(65) MG
325 TABLET ORAL
Qty: 30 TABLET | Refills: 5 | Status: SHIPPED | OUTPATIENT
Start: 2017-01-01

## 2017-01-01 RX ORDER — PANTOPRAZOLE SODIUM 40 MG/1
40 TABLET, DELAYED RELEASE ORAL DAILY
Status: DISCONTINUED | OUTPATIENT
Start: 2017-01-01 | End: 2017-01-01 | Stop reason: HOSPADM

## 2017-01-01 RX ORDER — CLOPIDOGREL BISULFATE 75 MG/1
75 TABLET ORAL DAILY
Status: DISCONTINUED | OUTPATIENT
Start: 2017-01-01 | End: 2017-01-01 | Stop reason: HOSPADM

## 2017-01-01 RX ORDER — ASCORBIC ACID 500 MG
500 TABLET ORAL DAILY
Qty: 30 TABLET | Refills: 5 | Status: SHIPPED | OUTPATIENT
Start: 2017-01-01

## 2017-01-01 RX ORDER — SODIUM CHLORIDE 9 MG/ML
125 INJECTION, SOLUTION INTRAVENOUS CONTINUOUS
Status: DISCONTINUED | OUTPATIENT
Start: 2017-01-01 | End: 2017-01-01

## 2017-01-01 RX ORDER — BUMETANIDE 1 MG/1
1 TABLET ORAL
Qty: 180 TABLET | Refills: 4 | Status: ON HOLD | OUTPATIENT
Start: 2017-01-01 | End: 2018-01-01

## 2017-01-01 RX ORDER — CLONAZEPAM 0.5 MG/1
0.5 TABLET ORAL NIGHTLY PRN
COMMUNITY
End: 2017-01-01 | Stop reason: HOSPADM

## 2017-01-01 RX ORDER — FLUCONAZOLE 150 MG/1
TABLET ORAL
Qty: 2 TABLET | Refills: 0 | Status: SHIPPED | OUTPATIENT
Start: 2017-01-01 | End: 2018-01-01

## 2017-01-01 RX ORDER — CHOLECALCIFEROL (VITAMIN D3) 125 MCG
5 CAPSULE ORAL NIGHTLY PRN
Qty: 90 TABLET | Refills: 0 | Status: SHIPPED | OUTPATIENT
Start: 2017-01-01 | End: 2017-01-01

## 2017-01-01 RX ORDER — MORPHINE SULFATE 2 MG/ML
2 INJECTION, SOLUTION INTRAMUSCULAR; INTRAVENOUS EVERY 4 HOURS PRN
Status: DISCONTINUED | OUTPATIENT
Start: 2017-01-01 | End: 2017-01-01

## 2017-01-01 RX ORDER — CEFAZOLIN SODIUM 2 G/100ML
2 INJECTION, SOLUTION INTRAVENOUS EVERY 8 HOURS
Status: DISCONTINUED | OUTPATIENT
Start: 2017-01-01 | End: 2017-01-01 | Stop reason: HOSPADM

## 2017-01-01 RX ORDER — CYCLOBENZAPRINE HCL 10 MG
10 TABLET ORAL NIGHTLY PRN
Status: DISCONTINUED | OUTPATIENT
Start: 2017-01-01 | End: 2017-01-01 | Stop reason: HOSPADM

## 2017-01-01 RX ORDER — BUMETANIDE 1 MG/1
1 TABLET ORAL 2 TIMES DAILY
Status: DISCONTINUED | OUTPATIENT
Start: 2017-01-01 | End: 2017-01-01 | Stop reason: HOSPADM

## 2017-01-01 RX ORDER — SODIUM CHLORIDE 9 MG/ML
100 INJECTION, SOLUTION INTRAVENOUS CONTINUOUS
Status: DISCONTINUED | OUTPATIENT
Start: 2017-01-01 | End: 2017-01-01

## 2017-01-01 RX ORDER — POTASSIUM CHLORIDE 1.5 G/1.77G
20 POWDER, FOR SOLUTION ORAL 2 TIMES DAILY
Status: DISCONTINUED | OUTPATIENT
Start: 2017-01-01 | End: 2017-01-01 | Stop reason: HOSPADM

## 2017-01-01 RX ORDER — CARVEDILOL 12.5 MG/1
12.5 TABLET ORAL 2 TIMES DAILY
Qty: 60 TABLET | Refills: 11
Start: 2017-01-01 | End: 2017-01-01 | Stop reason: SDUPTHER

## 2017-01-01 RX ORDER — MAGNESIUM SULFATE HEPTAHYDRATE 40 MG/ML
4 INJECTION, SOLUTION INTRAVENOUS AS NEEDED
Status: DISCONTINUED | OUTPATIENT
Start: 2017-01-01 | End: 2017-01-01 | Stop reason: HOSPADM

## 2017-01-01 RX ORDER — ASPIRIN 81 MG/1
81 TABLET ORAL DAILY
COMMUNITY
End: 2017-01-01 | Stop reason: SDUPTHER

## 2017-01-01 RX ORDER — HYDROCODONE BITARTRATE AND ACETAMINOPHEN 10; 325 MG/1; MG/1
1 TABLET ORAL EVERY 4 HOURS PRN
Status: DISCONTINUED | OUTPATIENT
Start: 2017-01-01 | End: 2017-01-01 | Stop reason: HOSPADM

## 2017-01-01 RX ORDER — CLOPIDOGREL BISULFATE 75 MG/1
75 TABLET ORAL DAILY
Qty: 30 TABLET | Refills: 12 | Status: SHIPPED | OUTPATIENT
Start: 2017-01-01 | End: 2018-01-01 | Stop reason: ALTCHOICE

## 2017-01-01 RX ORDER — METOPROLOL TARTRATE 50 MG/1
50 TABLET, FILM COATED ORAL 2 TIMES DAILY
Status: DISCONTINUED | OUTPATIENT
Start: 2017-01-01 | End: 2017-01-01 | Stop reason: HOSPADM

## 2017-01-01 RX ORDER — METOLAZONE 5 MG/1
5 TABLET ORAL 3 TIMES WEEKLY
Qty: 15 TABLET | Refills: 4 | Status: ON HOLD | OUTPATIENT
Start: 2017-01-01 | End: 2018-01-01

## 2017-01-01 RX ORDER — CYCLOBENZAPRINE HCL 10 MG
10 TABLET ORAL NIGHTLY PRN
COMMUNITY

## 2017-01-01 RX ORDER — HYDROMORPHONE HYDROCHLORIDE 1 MG/ML
0.5 INJECTION, SOLUTION INTRAMUSCULAR; INTRAVENOUS; SUBCUTANEOUS
Status: DISCONTINUED | OUTPATIENT
Start: 2017-01-01 | End: 2017-01-01 | Stop reason: SDUPTHER

## 2017-01-01 RX ORDER — ISOSORBIDE MONONITRATE 60 MG/1
120 TABLET, EXTENDED RELEASE ORAL DAILY
Status: DISCONTINUED | OUTPATIENT
Start: 2017-01-01 | End: 2017-01-01 | Stop reason: HOSPADM

## 2017-01-01 RX ORDER — QUETIAPINE FUMARATE 25 MG/1
25 TABLET, FILM COATED ORAL EVERY 12 HOURS SCHEDULED
Qty: 60 TABLET | Refills: 0 | Status: SHIPPED | OUTPATIENT
Start: 2017-01-01 | End: 2017-01-01

## 2017-01-01 RX ORDER — METOLAZONE 5 MG/1
5 TABLET ORAL DAILY PRN
Qty: 14 TABLET | Refills: 0 | Status: SHIPPED | OUTPATIENT
Start: 2017-01-01 | End: 2017-01-01 | Stop reason: SDUPTHER

## 2017-01-01 RX ORDER — FUROSEMIDE 40 MG/1
40 TABLET ORAL 2 TIMES DAILY
COMMUNITY
End: 2017-01-01

## 2017-01-01 RX ORDER — CARVEDILOL 12.5 MG/1
12.5 TABLET ORAL 2 TIMES DAILY
Qty: 60 TABLET | Refills: 11
Start: 2017-01-01 | End: 2018-01-01 | Stop reason: SDUPTHER

## 2017-01-01 RX ORDER — METOLAZONE 5 MG/1
5 TABLET ORAL DAILY PRN
Qty: 14 TABLET | Refills: 0 | Status: SHIPPED | OUTPATIENT
Start: 2017-01-01 | End: 2017-01-01 | Stop reason: HOSPADM

## 2017-01-01 RX ORDER — GABAPENTIN 400 MG/1
800 CAPSULE ORAL EVERY 8 HOURS SCHEDULED
Status: DISCONTINUED | OUTPATIENT
Start: 2017-01-01 | End: 2017-01-01 | Stop reason: HOSPADM

## 2017-01-01 RX ADMIN — QUETIAPINE FUMARATE 25 MG: 25 TABLET, FILM COATED ORAL at 22:42

## 2017-01-01 RX ADMIN — METOPROLOL TARTRATE 50 MG: 50 TABLET, FILM COATED ORAL at 08:23

## 2017-01-01 RX ADMIN — QUETIAPINE FUMARATE 25 MG: 25 TABLET, FILM COATED ORAL at 08:22

## 2017-01-01 RX ADMIN — HYDROMORPHONE HYDROCHLORIDE 1 MG: 1 INJECTION, SOLUTION INTRAMUSCULAR; INTRAVENOUS; SUBCUTANEOUS at 00:57

## 2017-01-01 RX ADMIN — GABAPENTIN 800 MG: 400 CAPSULE ORAL at 05:31

## 2017-01-01 RX ADMIN — SODIUM CHLORIDE 125 MG: 9 INJECTION, SOLUTION INTRAVENOUS at 15:22

## 2017-01-01 RX ADMIN — IBUPROFEN 400 MG: 400 TABLET ORAL at 08:58

## 2017-01-01 RX ADMIN — QUETIAPINE FUMARATE 25 MG: 25 TABLET, FILM COATED ORAL at 08:18

## 2017-01-01 RX ADMIN — SODIUM CHLORIDE 75 ML/HR: 9 INJECTION, SOLUTION INTRAVENOUS at 18:00

## 2017-01-01 RX ADMIN — INSULIN HUMAN 3 UNITS: 100 INJECTION, SOLUTION PARENTERAL at 05:31

## 2017-01-01 RX ADMIN — QUETIAPINE FUMARATE 25 MG: 25 TABLET, FILM COATED ORAL at 09:01

## 2017-01-01 RX ADMIN — PANTOPRAZOLE SODIUM 40 MG: 40 TABLET, DELAYED RELEASE ORAL at 08:18

## 2017-01-01 RX ADMIN — AMITRIPTYLINE HYDROCHLORIDE 25 MG: 25 TABLET, FILM COATED ORAL at 20:26

## 2017-01-01 RX ADMIN — ATORVASTATIN CALCIUM 40 MG: 40 TABLET, FILM COATED ORAL at 21:23

## 2017-01-01 RX ADMIN — BUMETANIDE 1 MG: 1 TABLET ORAL at 08:24

## 2017-01-01 RX ADMIN — IPRATROPIUM BROMIDE AND ALBUTEROL SULFATE 3 ML: .5; 3 SOLUTION RESPIRATORY (INHALATION) at 06:53

## 2017-01-01 RX ADMIN — PAROXETINE HYDROCHLORIDE 20 MG: 20 TABLET, FILM COATED ORAL at 06:54

## 2017-01-01 RX ADMIN — HEPARIN SODIUM 5000 UNITS: 5000 INJECTION, SOLUTION INTRAVENOUS; SUBCUTANEOUS at 21:05

## 2017-01-01 RX ADMIN — INSULIN DETEMIR 10 UNITS: 100 INJECTION, SOLUTION SUBCUTANEOUS at 09:02

## 2017-01-01 RX ADMIN — CLONAZEPAM 0.5 MG: 0.5 TABLET ORAL at 20:34

## 2017-01-01 RX ADMIN — DIATRIZOATE MEGLUMINE AND DIATRIZOATE SODIUM 30 ML: 660; 100 LIQUID ORAL; RECTAL at 10:30

## 2017-01-01 RX ADMIN — QUETIAPINE FUMARATE 25 MG: 25 TABLET, FILM COATED ORAL at 21:24

## 2017-01-01 RX ADMIN — LEVOTHYROXINE SODIUM 50 MCG: 50 TABLET ORAL at 08:18

## 2017-01-01 RX ADMIN — BUMETANIDE 1 MG: 1 TABLET ORAL at 08:52

## 2017-01-01 RX ADMIN — HYDROCODONE BITARTRATE AND ACETAMINOPHEN 1 TABLET: 10; 325 TABLET ORAL at 17:48

## 2017-01-01 RX ADMIN — HYDROMORPHONE HYDROCHLORIDE 1 MG: 1 INJECTION, SOLUTION INTRAMUSCULAR; INTRAVENOUS; SUBCUTANEOUS at 22:35

## 2017-01-01 RX ADMIN — BUMETANIDE 1 MG: 1 TABLET ORAL at 17:00

## 2017-01-01 RX ADMIN — CYCLOBENZAPRINE HYDROCHLORIDE 10 MG: 10 TABLET, FILM COATED ORAL at 01:08

## 2017-01-01 RX ADMIN — IPRATROPIUM BROMIDE AND ALBUTEROL SULFATE 3 ML: .5; 3 SOLUTION RESPIRATORY (INHALATION) at 07:09

## 2017-01-01 RX ADMIN — INSULIN HUMAN 3 UNITS: 100 INJECTION, SOLUTION PARENTERAL at 12:31

## 2017-01-01 RX ADMIN — CLOPIDOGREL BISULFATE 75 MG: 75 TABLET ORAL at 22:42

## 2017-01-01 RX ADMIN — HYDROCODONE BITARTRATE AND ACETAMINOPHEN 1 TABLET: 10; 325 TABLET ORAL at 08:27

## 2017-01-01 RX ADMIN — IPRATROPIUM BROMIDE AND ALBUTEROL SULFATE 3 ML: .5; 3 SOLUTION RESPIRATORY (INHALATION) at 07:15

## 2017-01-01 RX ADMIN — HYDROCODONE BITARTRATE AND ACETAMINOPHEN 1 TABLET: 10; 325 TABLET ORAL at 11:13

## 2017-01-01 RX ADMIN — POTASSIUM CHLORIDE 20 MEQ: 1.5 POWDER, FOR SOLUTION ORAL at 08:22

## 2017-01-01 RX ADMIN — IPRATROPIUM BROMIDE AND ALBUTEROL SULFATE 3 ML: .5; 3 SOLUTION RESPIRATORY (INHALATION) at 17:00

## 2017-01-01 RX ADMIN — PANTOPRAZOLE SODIUM 40 MG: 40 TABLET, DELAYED RELEASE ORAL at 08:23

## 2017-01-01 RX ADMIN — HYDROMORPHONE HYDROCHLORIDE 1 MG: 1 INJECTION, SOLUTION INTRAMUSCULAR; INTRAVENOUS; SUBCUTANEOUS at 06:22

## 2017-01-01 RX ADMIN — TAMSULOSIN HYDROCHLORIDE 0.4 MG: 0.4 CAPSULE ORAL at 08:23

## 2017-01-01 RX ADMIN — GABAPENTIN 800 MG: 400 CAPSULE ORAL at 14:37

## 2017-01-01 RX ADMIN — AMITRIPTYLINE HYDROCHLORIDE 25 MG: 25 TABLET, FILM COATED ORAL at 20:34

## 2017-01-01 RX ADMIN — ASPIRIN 81 MG: 81 TABLET, COATED ORAL at 08:28

## 2017-01-01 RX ADMIN — OXYCODONE AND ACETAMINOPHEN 1 TABLET: 5; 325 TABLET ORAL at 17:00

## 2017-01-01 RX ADMIN — IPRATROPIUM BROMIDE AND ALBUTEROL SULFATE 3 ML: .5; 3 SOLUTION RESPIRATORY (INHALATION) at 19:25

## 2017-01-01 RX ADMIN — INSULIN HUMAN 14 UNITS: 100 INJECTION, SOLUTION PARENTERAL at 00:46

## 2017-01-01 RX ADMIN — LEVOTHYROXINE SODIUM 50 MCG: 50 TABLET ORAL at 08:23

## 2017-01-01 RX ADMIN — ISOSORBIDE MONONITRATE 120 MG: 60 TABLET, EXTENDED RELEASE ORAL at 22:42

## 2017-01-01 RX ADMIN — LEVOTHYROXINE SODIUM 50 MCG: 50 TABLET ORAL at 06:54

## 2017-01-01 RX ADMIN — AMITRIPTYLINE HYDROCHLORIDE 25 MG: 25 TABLET, FILM COATED ORAL at 21:24

## 2017-01-01 RX ADMIN — GABAPENTIN 800 MG: 400 CAPSULE ORAL at 23:14

## 2017-01-01 RX ADMIN — CEFAZOLIN SODIUM 2 G: 2 INJECTION, SOLUTION INTRAVENOUS at 17:48

## 2017-01-01 RX ADMIN — SODIUM CHLORIDE 500 ML: 9 INJECTION, SOLUTION INTRAVENOUS at 15:36

## 2017-01-01 RX ADMIN — GABAPENTIN 800 MG: 400 CAPSULE ORAL at 06:21

## 2017-01-01 RX ADMIN — POTASSIUM CHLORIDE 20 MEQ: 1.5 POWDER, FOR SOLUTION ORAL at 08:54

## 2017-01-01 RX ADMIN — HYDROCODONE BITARTRATE AND ACETAMINOPHEN 1 TABLET: 10; 325 TABLET ORAL at 18:21

## 2017-01-01 RX ADMIN — TAMSULOSIN HYDROCHLORIDE 0.4 MG: 0.4 CAPSULE ORAL at 09:01

## 2017-01-01 RX ADMIN — HYDROMORPHONE HYDROCHLORIDE 0.5 MG: 1 INJECTION, SOLUTION INTRAMUSCULAR; INTRAVENOUS; SUBCUTANEOUS at 19:01

## 2017-01-01 RX ADMIN — HYDROCODONE BITARTRATE AND ACETAMINOPHEN 1 TABLET: 10; 325 TABLET ORAL at 18:23

## 2017-01-01 RX ADMIN — HEPARIN SODIUM 5000 UNITS: 5000 INJECTION, SOLUTION INTRAVENOUS; SUBCUTANEOUS at 09:57

## 2017-01-01 RX ADMIN — HYDROCODONE BITARTRATE AND ACETAMINOPHEN 1 TABLET: 10; 325 TABLET ORAL at 06:10

## 2017-01-01 RX ADMIN — TAMSULOSIN HYDROCHLORIDE 0.4 MG: 0.4 CAPSULE ORAL at 08:18

## 2017-01-01 RX ADMIN — QUETIAPINE FUMARATE 25 MG: 25 TABLET, FILM COATED ORAL at 09:53

## 2017-01-01 RX ADMIN — PANTOPRAZOLE SODIUM 40 MG: 40 INJECTION, POWDER, FOR SOLUTION INTRAVENOUS at 09:34

## 2017-01-01 RX ADMIN — POTASSIUM CHLORIDE 20 MEQ: 1.5 POWDER, FOR SOLUTION ORAL at 17:00

## 2017-01-01 RX ADMIN — BUMETANIDE 1 MG: 1 TABLET ORAL at 14:37

## 2017-01-01 RX ADMIN — CLOPIDOGREL BISULFATE 75 MG: 75 TABLET ORAL at 08:52

## 2017-01-01 RX ADMIN — SODIUM CHLORIDE 125 ML/HR: 9 INJECTION, SOLUTION INTRAVENOUS at 11:42

## 2017-01-01 RX ADMIN — ENOXAPARIN SODIUM 40 MG: 40 INJECTION SUBCUTANEOUS at 09:02

## 2017-01-01 RX ADMIN — BUMETANIDE 1 MG: 1 TABLET ORAL at 22:42

## 2017-01-01 RX ADMIN — POTASSIUM CHLORIDE 20 MEQ: 1.5 POWDER, FOR SOLUTION ORAL at 09:53

## 2017-01-01 RX ADMIN — BUMETANIDE 1 MG: 1 TABLET ORAL at 08:18

## 2017-01-01 RX ADMIN — CLONAZEPAM 0.5 MG: 0.5 TABLET ORAL at 20:35

## 2017-01-01 RX ADMIN — PANTOPRAZOLE SODIUM 40 MG: 40 TABLET, DELAYED RELEASE ORAL at 08:22

## 2017-01-01 RX ADMIN — INSULIN DETEMIR 20 UNITS: 100 INJECTION, SOLUTION SUBCUTANEOUS at 21:04

## 2017-01-01 RX ADMIN — IPRATROPIUM BROMIDE AND ALBUTEROL SULFATE 3 ML: .5; 3 SOLUTION RESPIRATORY (INHALATION) at 12:25

## 2017-01-01 RX ADMIN — HYDROMORPHONE HYDROCHLORIDE 1 MG: 1 INJECTION, SOLUTION INTRAMUSCULAR; INTRAVENOUS; SUBCUTANEOUS at 04:14

## 2017-01-01 RX ADMIN — HYDROMORPHONE HYDROCHLORIDE 1 MG: 1 INJECTION, SOLUTION INTRAMUSCULAR; INTRAVENOUS; SUBCUTANEOUS at 05:31

## 2017-01-01 RX ADMIN — GABAPENTIN 800 MG: 400 CAPSULE ORAL at 13:50

## 2017-01-01 RX ADMIN — TAMSULOSIN HYDROCHLORIDE 0.4 MG: 0.4 CAPSULE ORAL at 08:27

## 2017-01-01 RX ADMIN — CEFAZOLIN SODIUM 2 G: 2 INJECTION, SOLUTION INTRAVENOUS at 18:26

## 2017-01-01 RX ADMIN — CLOPIDOGREL BISULFATE 75 MG: 75 TABLET ORAL at 08:18

## 2017-01-01 RX ADMIN — OXYCODONE AND ACETAMINOPHEN 1 TABLET: 5; 325 TABLET ORAL at 23:05

## 2017-01-01 RX ADMIN — METOPROLOL TARTRATE 50 MG: 50 TABLET, FILM COATED ORAL at 08:18

## 2017-01-01 RX ADMIN — OXYCODONE AND ACETAMINOPHEN 1 TABLET: 5; 325 TABLET ORAL at 08:58

## 2017-01-01 RX ADMIN — CLOPIDOGREL BISULFATE 75 MG: 75 TABLET ORAL at 08:25

## 2017-01-01 RX ADMIN — PAROXETINE HYDROCHLORIDE 20 MG: 20 TABLET, FILM COATED ORAL at 06:40

## 2017-01-01 RX ADMIN — BUMETANIDE 1 MG: 1 TABLET ORAL at 18:21

## 2017-01-01 RX ADMIN — IPRATROPIUM BROMIDE AND ALBUTEROL SULFATE 3 ML: .5; 3 SOLUTION RESPIRATORY (INHALATION) at 19:52

## 2017-01-01 RX ADMIN — BUMETANIDE 0.5 MG: 1 TABLET ORAL at 09:01

## 2017-01-01 RX ADMIN — ONDANSETRON 4 MG: 2 INJECTION INTRAMUSCULAR; INTRAVENOUS at 01:14

## 2017-01-01 RX ADMIN — ISOSORBIDE MONONITRATE 120 MG: 60 TABLET, EXTENDED RELEASE ORAL at 08:22

## 2017-01-01 RX ADMIN — IBUPROFEN 400 MG: 400 TABLET ORAL at 01:08

## 2017-01-01 RX ADMIN — FAMOTIDINE 20 MG: 10 INJECTION, SOLUTION INTRAVENOUS at 15:39

## 2017-01-01 RX ADMIN — QUETIAPINE FUMARATE 25 MG: 25 TABLET, FILM COATED ORAL at 21:03

## 2017-01-01 RX ADMIN — GABAPENTIN 800 MG: 400 CAPSULE ORAL at 21:04

## 2017-01-01 RX ADMIN — ASPIRIN 81 MG: 81 TABLET, COATED ORAL at 12:58

## 2017-01-01 RX ADMIN — IPRATROPIUM BROMIDE AND ALBUTEROL SULFATE 3 ML: .5; 3 SOLUTION RESPIRATORY (INHALATION) at 11:50

## 2017-01-01 RX ADMIN — ASPIRIN 81 MG: 81 TABLET, COATED ORAL at 08:23

## 2017-01-01 RX ADMIN — IPRATROPIUM BROMIDE AND ALBUTEROL SULFATE 3 ML: .5; 3 SOLUTION RESPIRATORY (INHALATION) at 12:54

## 2017-01-01 RX ADMIN — QUETIAPINE FUMARATE 25 MG: 25 TABLET, FILM COATED ORAL at 08:23

## 2017-01-01 RX ADMIN — PAROXETINE HYDROCHLORIDE 20 MG: 20 TABLET, FILM COATED ORAL at 08:01

## 2017-01-01 RX ADMIN — PANTOPRAZOLE SODIUM 40 MG: 40 TABLET, DELAYED RELEASE ORAL at 08:51

## 2017-01-01 RX ADMIN — OXYCODONE AND ACETAMINOPHEN 1 TABLET: 5; 325 TABLET ORAL at 08:23

## 2017-01-01 RX ADMIN — INSULIN HUMAN 10 UNITS: 100 INJECTION, SOLUTION PARENTERAL at 17:43

## 2017-01-01 RX ADMIN — HEPARIN SODIUM 5000 UNITS: 5000 INJECTION, SOLUTION INTRAVENOUS; SUBCUTANEOUS at 08:24

## 2017-01-01 RX ADMIN — TAMSULOSIN HYDROCHLORIDE 0.4 MG: 0.4 CAPSULE ORAL at 08:52

## 2017-01-01 RX ADMIN — HYDROMORPHONE HYDROCHLORIDE 1 MG: 1 INJECTION, SOLUTION INTRAMUSCULAR; INTRAVENOUS; SUBCUTANEOUS at 12:31

## 2017-01-01 RX ADMIN — PANTOPRAZOLE SODIUM 40 MG: 40 TABLET, DELAYED RELEASE ORAL at 06:40

## 2017-01-01 RX ADMIN — CEFAZOLIN SODIUM 2 G: 2 INJECTION, SOLUTION INTRAVENOUS at 17:00

## 2017-01-01 RX ADMIN — QUETIAPINE FUMARATE 25 MG: 25 TABLET, FILM COATED ORAL at 20:35

## 2017-01-01 RX ADMIN — LEVOTHYROXINE SODIUM 50 MCG: 50 TABLET ORAL at 09:53

## 2017-01-01 RX ADMIN — OXYCODONE AND ACETAMINOPHEN 1 TABLET: 5; 325 TABLET ORAL at 22:26

## 2017-01-01 RX ADMIN — DOXYCYCLINE HYCLATE 100 MG: 100 CAPSULE ORAL at 21:23

## 2017-01-01 RX ADMIN — PAROXETINE HYDROCHLORIDE 20 MG: 20 TABLET, FILM COATED ORAL at 06:10

## 2017-01-01 RX ADMIN — IPRATROPIUM BROMIDE AND ALBUTEROL SULFATE 3 ML: .5; 3 SOLUTION RESPIRATORY (INHALATION) at 19:15

## 2017-01-01 RX ADMIN — GABAPENTIN 800 MG: 400 CAPSULE ORAL at 20:34

## 2017-01-01 RX ADMIN — IPRATROPIUM BROMIDE AND ALBUTEROL SULFATE 3 ML: .5; 3 SOLUTION RESPIRATORY (INHALATION) at 07:46

## 2017-01-01 RX ADMIN — IPRATROPIUM BROMIDE AND ALBUTEROL SULFATE 3 ML: .5; 3 SOLUTION RESPIRATORY (INHALATION) at 16:02

## 2017-01-01 RX ADMIN — HEPARIN SODIUM 5000 UNITS: 5000 INJECTION, SOLUTION INTRAVENOUS; SUBCUTANEOUS at 08:53

## 2017-01-01 RX ADMIN — IPRATROPIUM BROMIDE AND ALBUTEROL SULFATE 3 ML: .5; 3 SOLUTION RESPIRATORY (INHALATION) at 15:57

## 2017-01-01 RX ADMIN — IPRATROPIUM BROMIDE AND ALBUTEROL SULFATE 3 ML: .5; 3 SOLUTION RESPIRATORY (INHALATION) at 13:27

## 2017-01-01 RX ADMIN — DOXYCYCLINE HYCLATE 100 MG: 100 CAPSULE ORAL at 08:18

## 2017-01-01 RX ADMIN — METOPROLOL TARTRATE 50 MG: 50 TABLET, FILM COATED ORAL at 17:00

## 2017-01-01 RX ADMIN — INSULIN ASPART 2 UNITS: 100 INJECTION, SOLUTION INTRAVENOUS; SUBCUTANEOUS at 11:58

## 2017-01-01 RX ADMIN — CEFAZOLIN SODIUM 2 G: 2 INJECTION, SOLUTION INTRAVENOUS at 01:08

## 2017-01-01 RX ADMIN — BUMETANIDE 0.5 MG: 1 TABLET ORAL at 17:14

## 2017-01-01 RX ADMIN — CLONAZEPAM 0.5 MG: 0.5 TABLET ORAL at 22:41

## 2017-01-01 RX ADMIN — INSULIN ASPART 2 UNITS: 100 INJECTION, SOLUTION INTRAVENOUS; SUBCUTANEOUS at 06:54

## 2017-01-01 RX ADMIN — IPRATROPIUM BROMIDE AND ALBUTEROL SULFATE 3 ML: .5; 3 SOLUTION RESPIRATORY (INHALATION) at 07:24

## 2017-01-01 RX ADMIN — ATORVASTATIN CALCIUM 40 MG: 40 TABLET, FILM COATED ORAL at 21:03

## 2017-01-01 RX ADMIN — METOPROLOL TARTRATE 50 MG: 50 TABLET, FILM COATED ORAL at 09:51

## 2017-01-01 RX ADMIN — CEFAZOLIN SODIUM 2 G: 2 INJECTION, SOLUTION INTRAVENOUS at 00:58

## 2017-01-01 RX ADMIN — ALPRAZOLAM 1 MG: 0.5 TABLET ORAL at 12:57

## 2017-01-01 RX ADMIN — HEPARIN SODIUM 5000 UNITS: 5000 INJECTION, SOLUTION INTRAVENOUS; SUBCUTANEOUS at 08:18

## 2017-01-01 RX ADMIN — GABAPENTIN 800 MG: 400 CAPSULE ORAL at 15:27

## 2017-01-01 RX ADMIN — GABAPENTIN 800 MG: 400 CAPSULE ORAL at 22:43

## 2017-01-01 RX ADMIN — GABAPENTIN 800 MG: 400 CAPSULE ORAL at 21:23

## 2017-01-01 RX ADMIN — ASPIRIN 81 MG: 81 TABLET, COATED ORAL at 09:53

## 2017-01-01 RX ADMIN — CEFAZOLIN SODIUM 2 G: 2 INJECTION, SOLUTION INTRAVENOUS at 09:44

## 2017-01-01 RX ADMIN — INSULIN HUMAN 3 UNITS: 100 INJECTION, SOLUTION PARENTERAL at 06:21

## 2017-01-01 RX ADMIN — QUETIAPINE FUMARATE 25 MG: 25 TABLET, FILM COATED ORAL at 08:52

## 2017-01-01 RX ADMIN — HYDROMORPHONE HYDROCHLORIDE 1 MG: 1 INJECTION, SOLUTION INTRAMUSCULAR; INTRAVENOUS; SUBCUTANEOUS at 21:04

## 2017-01-01 RX ADMIN — NICOTINE 1 PATCH: 21 PATCH, EXTENDED RELEASE TRANSDERMAL at 22:20

## 2017-01-01 RX ADMIN — HYDROMORPHONE HYDROCHLORIDE 1 MG: 1 INJECTION, SOLUTION INTRAMUSCULAR; INTRAVENOUS; SUBCUTANEOUS at 20:35

## 2017-01-01 RX ADMIN — HEPARIN SODIUM 5000 UNITS: 5000 INJECTION, SOLUTION INTRAVENOUS; SUBCUTANEOUS at 22:43

## 2017-01-01 RX ADMIN — METFORMIN HYDROCHLORIDE 500 MG: 500 TABLET, FILM COATED ORAL at 08:52

## 2017-01-01 RX ADMIN — ONDANSETRON 4 MG: 2 INJECTION INTRAMUSCULAR; INTRAVENOUS at 15:36

## 2017-01-01 RX ADMIN — LEVOTHYROXINE SODIUM 50 MCG: 50 TABLET ORAL at 08:22

## 2017-01-01 RX ADMIN — POTASSIUM CHLORIDE 20 MEQ: 1.5 POWDER, FOR SOLUTION ORAL at 17:43

## 2017-01-01 RX ADMIN — CALCIUM GLUCONATE 1 G: 94 INJECTION, SOLUTION INTRAVENOUS at 13:34

## 2017-01-01 RX ADMIN — LEVOTHYROXINE SODIUM 50 MCG: 50 TABLET ORAL at 08:52

## 2017-01-01 RX ADMIN — ATORVASTATIN CALCIUM 40 MG: 40 TABLET, FILM COATED ORAL at 21:24

## 2017-01-01 RX ADMIN — HYDROCODONE BITARTRATE AND ACETAMINOPHEN 1 TABLET: 10; 325 TABLET ORAL at 04:14

## 2017-01-01 RX ADMIN — Medication 10 ML: at 11:40

## 2017-01-01 RX ADMIN — IPRATROPIUM BROMIDE AND ALBUTEROL SULFATE 3 ML: .5; 3 SOLUTION RESPIRATORY (INHALATION) at 20:01

## 2017-01-01 RX ADMIN — IPRATROPIUM BROMIDE AND ALBUTEROL SULFATE 3 ML: .5; 3 SOLUTION RESPIRATORY (INHALATION) at 18:59

## 2017-01-01 RX ADMIN — INSULIN DETEMIR 15 UNITS: 100 INJECTION, SOLUTION SUBCUTANEOUS at 22:00

## 2017-01-01 RX ADMIN — INSULIN HUMAN 3 UNITS: 100 INJECTION, SOLUTION PARENTERAL at 06:10

## 2017-01-01 RX ADMIN — SODIUM CHLORIDE 75 ML/HR: 9 INJECTION, SOLUTION INTRAVENOUS at 03:47

## 2017-01-01 RX ADMIN — PAROXETINE HYDROCHLORIDE 20 MG: 20 TABLET, FILM COATED ORAL at 05:56

## 2017-01-01 RX ADMIN — GABAPENTIN 800 MG: 400 CAPSULE ORAL at 15:49

## 2017-01-01 RX ADMIN — CEFAZOLIN SODIUM 2 G: 2 INJECTION, SOLUTION INTRAVENOUS at 14:40

## 2017-01-01 RX ADMIN — IPRATROPIUM BROMIDE AND ALBUTEROL SULFATE 3 ML: .5; 3 SOLUTION RESPIRATORY (INHALATION) at 15:59

## 2017-01-01 RX ADMIN — HYDROMORPHONE HYDROCHLORIDE 0.5 MG: 1 INJECTION, SOLUTION INTRAMUSCULAR; INTRAVENOUS; SUBCUTANEOUS at 15:38

## 2017-01-01 RX ADMIN — HYDROMORPHONE HYDROCHLORIDE 1 MG: 1 INJECTION, SOLUTION INTRAMUSCULAR; INTRAVENOUS; SUBCUTANEOUS at 23:05

## 2017-01-01 RX ADMIN — METOPROLOL TARTRATE 50 MG: 50 TABLET, FILM COATED ORAL at 18:21

## 2017-01-01 RX ADMIN — HYDROCODONE BITARTRATE AND ACETAMINOPHEN 1 TABLET: 10; 325 TABLET ORAL at 08:22

## 2017-01-01 RX ADMIN — IPRATROPIUM BROMIDE AND ALBUTEROL SULFATE 3 ML: .5; 3 SOLUTION RESPIRATORY (INHALATION) at 19:59

## 2017-01-01 RX ADMIN — MAGNESIUM SULFATE HEPTAHYDRATE 2 G: 40 INJECTION, SOLUTION INTRAVENOUS at 13:40

## 2017-01-01 RX ADMIN — POTASSIUM CHLORIDE 20 MEQ: 1.5 POWDER, FOR SOLUTION ORAL at 18:21

## 2017-01-01 RX ADMIN — NICOTINE 1 PATCH: 21 PATCH, EXTENDED RELEASE TRANSDERMAL at 21:05

## 2017-01-01 RX ADMIN — INSULIN ASPART 6 UNITS: 100 INJECTION, SOLUTION INTRAVENOUS; SUBCUTANEOUS at 20:26

## 2017-01-01 RX ADMIN — Medication 2 TABLET: at 08:51

## 2017-01-01 RX ADMIN — INSULIN LISPRO 3 UNITS: 100 INJECTION, SOLUTION INTRAVENOUS; SUBCUTANEOUS at 12:05

## 2017-01-01 RX ADMIN — NICOTINE 1 PATCH: 21 PATCH TRANSDERMAL at 15:36

## 2017-01-01 RX ADMIN — NICOTINE 1 PATCH: 21 PATCH, EXTENDED RELEASE TRANSDERMAL at 20:34

## 2017-01-01 RX ADMIN — SODIUM CHLORIDE 125 ML/HR: 9 INJECTION, SOLUTION INTRAVENOUS at 19:02

## 2017-01-01 RX ADMIN — BUMETANIDE 0.5 MG: 1 TABLET ORAL at 08:28

## 2017-01-01 RX ADMIN — INSULIN LISPRO 5 UNITS: 100 INJECTION, SOLUTION INTRAVENOUS; SUBCUTANEOUS at 08:24

## 2017-01-01 RX ADMIN — ATORVASTATIN CALCIUM 40 MG: 40 TABLET, FILM COATED ORAL at 21:04

## 2017-01-01 RX ADMIN — ASPIRIN 81 MG: 81 TABLET, COATED ORAL at 08:53

## 2017-01-01 RX ADMIN — METOPROLOL TARTRATE 50 MG: 50 TABLET, FILM COATED ORAL at 08:52

## 2017-01-01 RX ADMIN — LEVOTHYROXINE SODIUM 50 MCG: 50 TABLET ORAL at 06:40

## 2017-01-01 RX ADMIN — INSULIN HUMAN 5 UNITS: 100 INJECTION, SOLUTION PARENTERAL at 00:32

## 2017-01-01 RX ADMIN — HYDROCODONE BITARTRATE AND ACETAMINOPHEN 1 TABLET: 10; 325 TABLET ORAL at 22:20

## 2017-01-01 RX ADMIN — CEFAZOLIN SODIUM 2 G: 2 INJECTION, SOLUTION INTRAVENOUS at 09:01

## 2017-01-01 RX ADMIN — ASPIRIN 81 MG: 81 TABLET, COATED ORAL at 09:01

## 2017-01-01 RX ADMIN — HYDROCODONE BITARTRATE AND ACETAMINOPHEN 1 TABLET: 10; 325 TABLET ORAL at 01:54

## 2017-01-01 RX ADMIN — ATORVASTATIN CALCIUM 40 MG: 40 TABLET, FILM COATED ORAL at 20:36

## 2017-01-01 RX ADMIN — IPRATROPIUM BROMIDE AND ALBUTEROL SULFATE 3 ML: .5; 3 SOLUTION RESPIRATORY (INHALATION) at 13:05

## 2017-01-01 RX ADMIN — CLONAZEPAM 0.5 MG: 0.5 TABLET ORAL at 21:24

## 2017-01-01 RX ADMIN — CLONAZEPAM 0.5 MG: 0.5 TABLET ORAL at 21:04

## 2017-01-01 RX ADMIN — IOPAMIDOL 70 ML: 755 INJECTION, SOLUTION INTRAVENOUS at 16:05

## 2017-01-01 RX ADMIN — TAMSULOSIN HYDROCHLORIDE 0.4 MG: 0.4 CAPSULE ORAL at 12:57

## 2017-01-01 RX ADMIN — INSULIN LISPRO 8 UNITS: 100 INJECTION, SOLUTION INTRAVENOUS; SUBCUTANEOUS at 21:05

## 2017-01-01 RX ADMIN — CLOPIDOGREL BISULFATE 75 MG: 75 TABLET ORAL at 09:53

## 2017-01-01 RX ADMIN — HYDROMORPHONE HYDROCHLORIDE 1 MG: 1 INJECTION, SOLUTION INTRAMUSCULAR; INTRAVENOUS; SUBCUTANEOUS at 23:42

## 2017-01-01 RX ADMIN — HYDROCODONE BITARTRATE AND ACETAMINOPHEN 1 TABLET: 10; 325 TABLET ORAL at 09:53

## 2017-01-01 RX ADMIN — SODIUM CHLORIDE 500 ML: 9 INJECTION, SOLUTION INTRAVENOUS at 17:55

## 2017-01-01 RX ADMIN — SULFUR HEXAFLUORIDE 4 ML: KIT at 13:09

## 2017-01-01 RX ADMIN — PANTOPRAZOLE SODIUM 40 MG: 40 TABLET, DELAYED RELEASE ORAL at 06:54

## 2017-01-01 RX ADMIN — METFORMIN HYDROCHLORIDE 500 MG: 500 TABLET, FILM COATED ORAL at 08:25

## 2017-01-01 RX ADMIN — POTASSIUM CHLORIDE 20 MEQ: 1.5 POWDER, FOR SOLUTION ORAL at 17:48

## 2017-01-01 RX ADMIN — HYDROMORPHONE HYDROCHLORIDE 1 MG: 1 INJECTION, SOLUTION INTRAMUSCULAR; INTRAVENOUS; SUBCUTANEOUS at 02:34

## 2017-01-01 RX ADMIN — HEPARIN SODIUM 5000 UNITS: 5000 INJECTION, SOLUTION INTRAVENOUS; SUBCUTANEOUS at 21:24

## 2017-01-01 RX ADMIN — POTASSIUM CHLORIDE 20 MEQ: 1.5 POWDER, FOR SOLUTION ORAL at 08:21

## 2017-01-01 RX ADMIN — HYDROMORPHONE HYDROCHLORIDE 0.5 MG: 1 INJECTION, SOLUTION INTRAMUSCULAR; INTRAVENOUS; SUBCUTANEOUS at 17:42

## 2017-01-01 RX ADMIN — ISOSORBIDE MONONITRATE 120 MG: 60 TABLET, EXTENDED RELEASE ORAL at 08:18

## 2017-01-01 RX ADMIN — ATORVASTATIN CALCIUM 40 MG: 40 TABLET, FILM COATED ORAL at 20:26

## 2017-01-01 RX ADMIN — HEPARIN SODIUM 5000 UNITS: 5000 INJECTION, SOLUTION INTRAVENOUS; SUBCUTANEOUS at 08:28

## 2017-01-01 RX ADMIN — ASPIRIN 81 MG: 81 TABLET, COATED ORAL at 08:18

## 2017-01-01 RX ADMIN — GABAPENTIN 800 MG: 400 CAPSULE ORAL at 06:10

## 2017-01-01 RX ADMIN — ONDANSETRON 4 MG: 2 INJECTION INTRAMUSCULAR; INTRAVENOUS at 05:57

## 2017-01-01 RX ADMIN — INSULIN DETEMIR 15 UNITS: 100 INJECTION, SOLUTION SUBCUTANEOUS at 21:24

## 2017-01-01 RX ADMIN — ENOXAPARIN SODIUM 40 MG: 40 INJECTION SUBCUTANEOUS at 08:27

## 2017-01-01 RX ADMIN — IPRATROPIUM BROMIDE AND ALBUTEROL SULFATE 3 ML: .5; 3 SOLUTION RESPIRATORY (INHALATION) at 20:28

## 2017-01-01 RX ADMIN — MORPHINE SULFATE 2 MG: 2 INJECTION, SOLUTION INTRAMUSCULAR; INTRAVENOUS at 05:57

## 2017-01-01 RX ADMIN — CLOPIDOGREL BISULFATE 75 MG: 75 TABLET ORAL at 08:21

## 2017-01-01 RX ADMIN — ENOXAPARIN SODIUM 40 MG: 40 INJECTION SUBCUTANEOUS at 09:34

## 2017-01-01 RX ADMIN — INSULIN HUMAN 5 UNITS: 100 INJECTION, SOLUTION PARENTERAL at 17:52

## 2017-01-01 RX ADMIN — TAMSULOSIN HYDROCHLORIDE 0.4 MG: 0.4 CAPSULE ORAL at 09:51

## 2017-01-01 RX ADMIN — QUETIAPINE FUMARATE 25 MG: 25 TABLET, FILM COATED ORAL at 20:25

## 2017-01-01 RX ADMIN — METOPROLOL TARTRATE 50 MG: 50 TABLET, FILM COATED ORAL at 17:48

## 2017-01-01 RX ADMIN — HYDROCODONE BITARTRATE AND ACETAMINOPHEN 1 TABLET: 10; 325 TABLET ORAL at 12:39

## 2017-01-01 RX ADMIN — IPRATROPIUM BROMIDE AND ALBUTEROL SULFATE 3 ML: .5; 3 SOLUTION RESPIRATORY (INHALATION) at 08:42

## 2017-01-01 RX ADMIN — ISOSORBIDE MONONITRATE 120 MG: 60 TABLET, EXTENDED RELEASE ORAL at 08:21

## 2017-01-01 RX ADMIN — NICOTINE 1 PATCH: 21 PATCH TRANSDERMAL at 13:54

## 2017-01-01 RX ADMIN — METOPROLOL TARTRATE 50 MG: 50 TABLET, FILM COATED ORAL at 17:43

## 2017-01-01 RX ADMIN — HEPARIN SODIUM 5000 UNITS: 5000 INJECTION, SOLUTION INTRAVENOUS; SUBCUTANEOUS at 20:34

## 2017-01-01 RX ADMIN — DOXYCYCLINE HYCLATE 100 MG: 100 CAPSULE ORAL at 14:37

## 2017-01-01 RX ADMIN — PAROXETINE HYDROCHLORIDE 20 MG: 20 TABLET, FILM COATED ORAL at 08:24

## 2017-01-01 RX ADMIN — QUETIAPINE FUMARATE 25 MG: 25 TABLET, FILM COATED ORAL at 08:28

## 2017-01-01 RX ADMIN — INSULIN DETEMIR 15 UNITS: 100 INJECTION, SOLUTION SUBCUTANEOUS at 21:40

## 2017-01-01 RX ADMIN — DEXTROSE AND SODIUM CHLORIDE 75 ML/HR: 5; 450 INJECTION, SOLUTION INTRAVENOUS at 17:14

## 2017-01-01 RX ADMIN — INSULIN LISPRO 5 UNITS: 100 INJECTION, SOLUTION INTRAVENOUS; SUBCUTANEOUS at 16:22

## 2017-01-01 RX ADMIN — HYDROCODONE BITARTRATE AND ACETAMINOPHEN 1 TABLET: 10; 325 TABLET ORAL at 17:14

## 2017-01-01 RX ADMIN — INSULIN DETEMIR 15 UNITS: 100 INJECTION, SOLUTION SUBCUTANEOUS at 22:44

## 2017-01-01 RX ADMIN — CEFAZOLIN SODIUM 2 G: 2 INJECTION, SOLUTION INTRAVENOUS at 02:34

## 2017-01-01 RX ADMIN — GABAPENTIN 800 MG: 400 CAPSULE ORAL at 05:55

## 2017-01-01 RX ADMIN — PAROXETINE HYDROCHLORIDE 20 MG: 20 TABLET, FILM COATED ORAL at 12:58

## 2017-01-01 RX ADMIN — ISOSORBIDE MONONITRATE 120 MG: 60 TABLET, EXTENDED RELEASE ORAL at 08:51

## 2017-01-01 RX ADMIN — INSULIN HUMAN 10 UNITS: 100 INJECTION, SOLUTION PARENTERAL at 12:36

## 2017-01-01 RX ADMIN — QUETIAPINE FUMARATE 25 MG: 25 TABLET, FILM COATED ORAL at 21:04

## 2017-01-01 RX ADMIN — HYDROCODONE BITARTRATE AND ACETAMINOPHEN 1 TABLET: 10; 325 TABLET ORAL at 13:15

## 2017-01-01 RX ADMIN — INSULIN HUMAN 10 UNITS: 100 INJECTION, SOLUTION PARENTERAL at 18:21

## 2017-01-01 RX ADMIN — PANTOPRAZOLE SODIUM 40 MG: 40 TABLET, DELAYED RELEASE ORAL at 09:51

## 2017-01-01 RX ADMIN — OXYCODONE AND ACETAMINOPHEN 1 TABLET: 5; 325 TABLET ORAL at 01:08

## 2017-01-01 RX ADMIN — INSULIN HUMAN 3 UNITS: 100 INJECTION, SOLUTION PARENTERAL at 05:40

## 2017-01-01 RX ADMIN — INSULIN LISPRO 5 UNITS: 100 INJECTION, SOLUTION INTRAVENOUS; SUBCUTANEOUS at 12:51

## 2017-01-01 RX ADMIN — GABAPENTIN 800 MG: 400 CAPSULE ORAL at 05:41

## 2017-01-01 RX ADMIN — INSULIN ASPART 2 UNITS: 100 INJECTION, SOLUTION INTRAVENOUS; SUBCUTANEOUS at 17:07

## 2017-01-01 RX ADMIN — AMITRIPTYLINE HYDROCHLORIDE 25 MG: 25 TABLET, FILM COATED ORAL at 21:23

## 2017-01-01 RX ADMIN — INSULIN HUMAN 8 UNITS: 100 INJECTION, SOLUTION PARENTERAL at 13:13

## 2017-01-01 RX ADMIN — CEFAZOLIN SODIUM 2 G: 2 INJECTION, SOLUTION INTRAVENOUS at 09:35

## 2017-01-01 RX ADMIN — IPRATROPIUM BROMIDE AND ALBUTEROL SULFATE 3 ML: .5; 3 SOLUTION RESPIRATORY (INHALATION) at 10:57

## 2017-01-01 RX ADMIN — ATORVASTATIN CALCIUM 40 MG: 40 TABLET, FILM COATED ORAL at 22:42

## 2017-01-01 RX ADMIN — ISOSORBIDE MONONITRATE 120 MG: 60 TABLET, EXTENDED RELEASE ORAL at 09:52

## 2017-01-01 RX ADMIN — METOPROLOL TARTRATE 50 MG: 50 TABLET, FILM COATED ORAL at 08:38

## 2017-01-01 RX ADMIN — PAROXETINE HYDROCHLORIDE 20 MG: 20 TABLET, FILM COATED ORAL at 06:21

## 2017-01-01 RX ADMIN — NICOTINE 1 PATCH: 21 PATCH, EXTENDED RELEASE TRANSDERMAL at 22:43

## 2017-01-01 RX ADMIN — HEPARIN SODIUM 5000 UNITS: 5000 INJECTION, SOLUTION INTRAVENOUS; SUBCUTANEOUS at 20:35

## 2017-01-01 RX ADMIN — IPRATROPIUM BROMIDE AND ALBUTEROL SULFATE 3 ML: .5; 3 SOLUTION RESPIRATORY (INHALATION) at 09:18

## 2017-01-01 RX ADMIN — POTASSIUM CHLORIDE 20 MEQ: 1.5 POWDER, FOR SOLUTION ORAL at 08:17

## 2017-01-01 RX ADMIN — TAMSULOSIN HYDROCHLORIDE 0.4 MG: 0.4 CAPSULE ORAL at 08:25

## 2017-02-06 ENCOUNTER — OFFICE VISIT (OUTPATIENT)
Dept: CARDIOLOGY | Facility: CLINIC | Age: 67
End: 2017-02-06

## 2017-02-06 VITALS
SYSTOLIC BLOOD PRESSURE: 128 MMHG | HEIGHT: 71 IN | HEART RATE: 62 BPM | BODY MASS INDEX: 34.64 KG/M2 | WEIGHT: 247.4 LBS | DIASTOLIC BLOOD PRESSURE: 68 MMHG

## 2017-02-06 DIAGNOSIS — I10 ESSENTIAL HYPERTENSION: ICD-10-CM

## 2017-02-06 DIAGNOSIS — R09.89 BILATERAL CAROTID BRUITS: ICD-10-CM

## 2017-02-06 DIAGNOSIS — I48.0 PAROXYSMAL ATRIAL FIBRILLATION (HCC): ICD-10-CM

## 2017-02-06 DIAGNOSIS — E78.5 HYPERLIPIDEMIA LDL GOAL <70: ICD-10-CM

## 2017-02-06 DIAGNOSIS — Z72.0 TOBACCO ABUSE: ICD-10-CM

## 2017-02-06 DIAGNOSIS — I25.709 CORONARY ARTERY DISEASE INVOLVING CORONARY BYPASS GRAFT OF NATIVE HEART WITH ANGINA PECTORIS (HCC): Primary | ICD-10-CM

## 2017-02-06 PROCEDURE — 99213 OFFICE O/P EST LOW 20 MIN: CPT | Performed by: NURSE PRACTITIONER

## 2017-02-06 RX ORDER — BUMETANIDE 1 MG/1
1 TABLET ORAL 2 TIMES DAILY
Qty: 180 TABLET | Refills: 3 | Status: SHIPPED | OUTPATIENT
Start: 2017-02-06 | End: 2017-01-01 | Stop reason: SDUPTHER

## 2017-02-06 RX ORDER — CARVEDILOL 12.5 MG/1
12.5 TABLET ORAL 2 TIMES DAILY WITH MEALS
Qty: 180 TABLET | Refills: 3 | Status: SHIPPED | OUTPATIENT
Start: 2017-02-06 | End: 2017-01-01 | Stop reason: SDUPTHER

## 2017-02-06 NOTE — PROGRESS NOTES
Subjective:     Encounter Date:02/06/2017      Patient ID: Ariel Ugalde is a 66 y.o. male.    Chief Complaint: Coronary Artery Disease      PROBLEM LIST:  1. Coronary artery disease:  a. CABG, 2000.  b. Redo CABG in 2009.  c. NSTEMI Cardiac catheterization, 11/17/2016: 95% ostial/proximal SVG to PDA treated with 3.0 x 15 Xience EES. 90% distal left main/ostial left circumflex treated with 3.5 x 12 Xience EES. Proximal LAD disease with patent CALVIN graft. Chronically occluded RCA. Large ectatic/aneurysmal saphenous vein graft to ramus branch. Diffuse disease, but nonflow limiting (50%). Elevated left ventricular end-diastolic pressures  2. Valvular heart disease   a. Echocardiogram, January 2016, mild to moderate aortic sclerosis and mild mitral regurgitation with normal ejection fraction.  b. EF 50%. LV wall thickness is consistent with mild concentric hypertrophy. Mild to moderate aortic valve stenosis is present.  3. Paroxysmal atrial fibrillation  a. Status post DAKOTAH and cardioversion, maintaining sinus rhythm by EKG 05/06/2014.   4. Chronic systolic congestive heart failure.  5. Hypertension.  6. Dyslipidemia.  7. COPD (chornic obstructive pulmonary disease).  8. Diabetes mellitus, type 2 (currently insulin dependent).  9. Peripheral neuropathy.  10. CKD (chornic kidney disease).  11. Chronic anemia.  12. Obesity.  13. Left lower extremity phlebitis.  14. Chronic lower extremity edema.  15. Remote left arm fracture, status post surgical revision in 1996.  16. History of osteoarthritis.  17. History of vertigo.  18. History of sleep apnea.  19. History of depression/anxiety.  20. Ongoing tobacco use/chronic obstructive pulmonary disease.  21. Other  remote surgical history.  a. Remote bowel surgery.      History of Present Illness  Patient returns today for follow up with a history of coronary artery disease. Since last visit, patient has been doing fairly well.  He has not had any recurrence of his chest pain  which brought him in back in November with his myocardial infarction.  He does continue to have shortness of breath.  Also continues to smoke 2 packs per day.  Patient denies any syncope, near-syncope, edema.  Complains of intermittent shortness of breath which is different day today.  Does have some wheezing at times.  Has had a recent upper respiratory illness.  States that he has been compliant with his medications.  Has only taken 2 nitroglycerin since being discharged from the hospital.  Prior to his hospitalization he was taking one bottle of nitroglycerin a day.    No Known Allergies      Current Outpatient Prescriptions:   •  ALPRAZolam (XANAX) 1 MG tablet, Take 1 mg by mouth Daily., Disp: , Rfl:   •  amLODIPine (NORVASC) 10 MG tablet, Take 10 mg by mouth daily., Disp: , Rfl:   •  aspirin EC 81 MG EC tablet, Take 1 tablet by mouth Daily for 360 days., Disp: 90 tablet, Rfl: 3  •  bumetanide (BUMEX) 1 MG tablet, Take 1 tablet by mouth 2 (Two) Times a Day., Disp: 180 tablet, Rfl: 3  •  carvedilol (COREG) 12.5 MG tablet, Take 1 tablet by mouth 2 (Two) Times a Day With Meals for 360 days., Disp: 180 tablet, Rfl: 3  •  clopidogrel (PLAVIX) 75 MG tablet, Take 1 tablet by mouth Daily for 360 days., Disp: 90 tablet, Rfl: 3  •  gabapentin (NEURONTIN) 800 MG tablet, Take 800 mg by mouth 4 (Four) Times a Day., Disp: , Rfl:   •  HYDROcodone-acetaminophen (NORCO)  MG per tablet, Take 1 tablet by mouth Every 4 (Four) Hours As Needed for moderate pain (4-6)., Disp: , Rfl:   •  insulin aspart (novoLOG) 100 UNIT/ML injection, Inject 10 Units under the skin 3 (Three) Times a Day Before Meals., Disp: , Rfl:   •  isosorbide mononitrate (IMDUR) 120 MG 24 hr tablet, Take 1 tablet by mouth Daily for 360 days., Disp: 90 tablet, Rfl: 3  •  NIACIN ER PO, Take 500 mg by mouth Daily., Disp: , Rfl:   •  nitroglycerin (NITROSTAT) 0.4 MG SL tablet, Place 0.4 mg under the tongue Every 5 (Five) Minutes As Needed for chest pain. Take no  "more than 3 doses in 15 minutes., Disp: , Rfl:   •  pantoprazole (PROTONIX) 40 MG EC tablet, Take 40 mg by mouth Daily., Disp: , Rfl:   •  PARoxetine (PAXIL) 20 MG tablet, Take 20 mg by mouth Every Morning., Disp: , Rfl:   •  potassium chloride (K-DUR,KLOR-CON) 20 MEQ CR tablet, Take 80 mEq by mouth Daily., Disp: , Rfl:   •  rosuvastatin (CRESTOR) 40 MG tablet, Take 1 tablet by mouth Every Night., Disp: 90 tablet, Rfl: 3  •  ROSUVASTATIN CALCIUM PO, Take 20 mg by mouth Daily., Disp: , Rfl:   •  sitaGLIPtin-metFORMIN (JANUMET)  MG per tablet, Take 1 tablet by mouth 2 (Two) Times a Day With Meals., Disp: , Rfl:   •  tamsulosin (FLOMAX) 0.4 MG capsule 24 hr capsule, Take 1 capsule by mouth Daily. Take 1/2 hour following the same meal every day, Disp: , Rfl:   •  vitamin D (ERGOCALCIFEROL) 01685 UNITS capsule capsule, Take 50,000 Units by mouth 1 (One) Time Per Week. Takes on Fridays, Disp: , Rfl:     The following portions of the patient's history were reviewed and updated as appropriate: allergies, current medications, past family history, past medical history, past social history, past surgical history and problem list.    Review of Systems   Constitution: Negative.   Cardiovascular: Negative.    Respiratory: Positive for cough, shortness of breath and wheezing.    Hematologic/Lymphatic: Negative for bleeding problem. Does not bruise/bleed easily.   Skin: Negative for rash.   Musculoskeletal: Negative for muscle weakness and myalgias.   Gastrointestinal: Negative for heartburn, nausea and vomiting.   Neurological: Negative.           Objective:     Vitals:    02/06/17 1410 02/06/17 1449   BP: 150/78 128/68   BP Location: Right arm    Patient Position: Sitting    Pulse: 62    Weight: 247 lb 6.4 oz (112 kg)    Height: 71\" (180.3 cm)     repeat blood pressure 128/68      Physical Exam   Constitutional: He is oriented to person, place, and time. He appears well-developed and well-nourished. No distress.   HENT: "   Mouth/Throat: Oropharynx is clear and moist.   Neck: No JVD present. Carotid bruit is present (bilateral).   Cardiovascular: Regular rhythm, S1 normal, S2 normal, normal heart sounds and intact distal pulses.    Pulmonary/Chest: Effort normal. He has wheezes (expiratory, bilateral).   Abdominal: Soft. Bowel sounds are normal. There is no tenderness.   Musculoskeletal: He exhibits no edema.   Neurological: He is alert and oriented to person, place, and time.   Skin: Skin is warm and dry.       Procedures        Assessment:   Ariel was seen today for coronary artery disease.    Diagnoses and all orders for this visit:    Coronary artery disease involving coronary bypass graft of native heart with angina pectoris    Essential hypertension, controlled    Hyperlipidemia LDL goal <70, on statin therapy    Tobacco abuse, ongoing.  Encouraged cessation    Paroxysmal atrial fibrillation, no noted recurrence.  Patient with regular rate and rhythm today.    Bilateral carotid bruits            Plan:  1. Given bilateral carotid bruit we will obtain a carotid duplex.  2. Check LFTs and lipid panel at time of duplex.  3. Discussed with patient contraindications for his continued smoking.  Encouraged cessation.   4. Given the patient's abnormal lung sounds today discussed with him that his shortness of breath is likely related to his COPD.  Again, encouraged smoking cessation.  As well as encouraged compliance with his inhalers; recently prescribed albuterol nebulizers and Tudorza.   5. Continue current medications.  6. Revisit in 6 MO, or sooner as needed.    HERNÁN Sahu

## 2017-03-06 ENCOUNTER — TELEPHONE (OUTPATIENT)
Dept: OTHER | Age: 67
End: 2017-03-06

## 2017-03-16 ENCOUNTER — TELEPHONE (OUTPATIENT)
Dept: OTHER | Age: 67
End: 2017-03-16

## 2017-06-05 NOTE — PROGRESS NOTES
Subjective:     Encounter Date:06/05/2017      Patient ID: Ariel Ugalde is a 67 y.o. male.    Chief Complaint: Coronary Artery Disease; Valvular heart disease; and Atrial Fibrillation    PROBLEM LIST:  1. Coronary artery disease:  a. CABG, 2000.  b. Redo CABG in 2009.  c. NSTEMI Cardiac catheterization, 11/17/2016: 95% ostial/proximal SVG to PDA treated with 3.0 x 15 Xience EES. 90% distal left main/ostial left circumflex treated with 3.5 x 12 Xience EES. Proximal LAD disease with patent CALVIN graft. Chronically occluded RCA. Large ectatic/aneurysmal saphenous vein graft to ramus branch. Diffuse disease, but nonflow limiting (50%). Elevated left ventricular end-diastolic pressures  2. Valvular heart disease   a. Echocardiogram, January 2016, mild to moderate aortic sclerosis and mild mitral regurgitation with normal ejection fraction.  b. EF 50%. LV wall thickness is consistent with mild concentric hypertrophy. Mild to moderate aortic valve stenosis is present.  3. Paroxysmal atrial fibrillation  a. Status post DAKOTAH and cardioversion, maintaining sinus rhythm by EKG 05/06/2014.   4. Chronic systolic congestive heart failure.  5. Hypertension.  6. Dyslipidemia.  7. COPD (chornic obstructive pulmonary disease).  a. CXR 5/30/17: Cardiomegaly and moderate pulmonary venous hypertension. No evidence of overt congestive failure.  8. Diabetes mellitus, type 2 (currently insulin dependent).  9. Peripheral neuropathy.  10. CKD (chornic kidney disease).  11. Chronic anemia.  12. Obesity.  13. Left lower extremity phlebitis.  14. Chronic lower extremity edema.  15. Remote left arm fracture, status post surgical revision in 1996.  16. History of osteoarthritis.  17. History of vertigo.  18. History of sleep apnea.  19. History of depression/anxiety.  20. Ongoing tobacco use/chronic obstructive pulmonary disease.  21. Other remote surgical history.  a. Remote bowel surgery.    History of Present Illness  Patient returns today  for follow up with a history of CAD, VHD, and PAF with cardiac risk factors. Has been compliant to his recent medication changes. Since then has been feeling much better, symptoms have mostly improved. Relative notes that he often has an anxious tremor of unknown cause. Denies any exertional chest pain, shortness of breath, orthopnea, PND, or palpitations. Continues to smoke cigarettes.    No Known Allergies      Current Outpatient Prescriptions:   •  amitriptyline (ELAVIL) 25 MG tablet, Take 25 mg by mouth At Night As Needed for Sleep., Disp: , Rfl:   •  amLODIPine (NORVASC) 10 MG tablet, Take 10 mg by mouth daily., Disp: , Rfl:   •  aspirin EC 81 MG EC tablet, Take 1 tablet by mouth Daily for 360 days., Disp: 90 tablet, Rfl: 3  •  bumetanide (BUMEX) 1 MG tablet, Take 1 tablet by mouth 2 (Two) Times a Day., Disp: 180 tablet, Rfl: 3  •  carvedilol (COREG) 12.5 MG tablet, Take 1 tablet by mouth 2 (Two) Times a Day With Meals for 360 days., Disp: 180 tablet, Rfl: 3  •  clonazePAM (KlonoPIN) 0.5 MG tablet, Take 0.5 mg by mouth At Night As Needed for Seizures., Disp: , Rfl:   •  clopidogrel (PLAVIX) 75 MG tablet, Take 1 tablet by mouth Daily for 360 days., Disp: 90 tablet, Rfl: 3  •  cyclobenzaprine (FLEXERIL) 10 MG tablet, Take 10 mg by mouth At Night As Needed for Muscle Spasms., Disp: , Rfl:   •  gabapentin (NEURONTIN) 800 MG tablet, Take 800 mg by mouth 4 (Four) Times a Day., Disp: , Rfl:   •  HYDROcodone-acetaminophen (NORCO)  MG per tablet, Take 1 tablet by mouth Every 4 (Four) Hours As Needed for moderate pain (4-6)., Disp: , Rfl:   •  insulin aspart (novoLOG) 100 UNIT/ML injection, Inject 10 Units under the skin 3 (Three) Times a Day Before Meals., Disp: , Rfl:   •  isosorbide mononitrate (IMDUR) 120 MG 24 hr tablet, Take 1 tablet by mouth Daily for 360 days., Disp: 90 tablet, Rfl: 3  •  levothyroxine (SYNTHROID, LEVOTHROID) 50 MCG tablet, Take 50 mcg by mouth Daily., Disp: , Rfl:   •  metFORMIN  (GLUCOPHAGE) 500 MG tablet, Take 500 mg by mouth 2 (Two) Times a Day With Meals., Disp: , Rfl:   •  metOLazone (ZAROXOLYN) 5 MG tablet, Take 1 tablet by mouth Daily As Needed (Swelling)., Disp: 14 tablet, Rfl: 0  •  NIACIN ER PO, Take 500 mg by mouth Daily., Disp: , Rfl:   •  nitroglycerin (NITROSTAT) 0.4 MG SL tablet, Place 0.4 mg under the tongue Every 5 (Five) Minutes As Needed for chest pain. Take no more than 3 doses in 15 minutes., Disp: , Rfl:   •  pantoprazole (PROTONIX) 40 MG EC tablet, Take 40 mg by mouth Daily., Disp: , Rfl:   •  PARoxetine (PAXIL) 20 MG tablet, Take 20 mg by mouth Every Morning., Disp: , Rfl:   •  potassium chloride (K-DUR,KLOR-CON) 20 MEQ CR tablet, Take 80 mEq by mouth 2 (Two) Times a Day., Disp: , Rfl:   •  ROSUVASTATIN CALCIUM PO, Take 20 mg by mouth Daily., Disp: , Rfl:   •  sitaGLIPtin-metFORMIN (JANUMET)  MG per tablet, Take 1 tablet by mouth 2 (Two) Times a Day With Meals., Disp: , Rfl:   •  tamsulosin (FLOMAX) 0.4 MG capsule 24 hr capsule, Take 1 capsule by mouth Daily. Take 1/2 hour following the same meal every day, Disp: , Rfl:   •  Unable to find, 1 each At Night As Needed. Med Name: CPAP MACHINE, Disp: , Rfl:   •  vitamin D (ERGOCALCIFEROL) 25036 UNITS capsule capsule, Take 50,000 Units by mouth 1 (One) Time Per Week. Takes on Fridays, Disp: , Rfl:     The following portions of the patient's history were reviewed and updated as appropriate: allergies, current medications, past family history, past medical history, past social history, past surgical history and problem list.    Review of Systems   Constitution: Negative.   Cardiovascular: Negative.    Respiratory: Negative.    Hematologic/Lymphatic: Negative for bleeding problem. Does not bruise/bleed easily.   Skin: Negative for rash.   Musculoskeletal: Negative for muscle weakness and myalgias.   Gastrointestinal: Negative for heartburn, nausea and vomiting.   Neurological: Negative.      Objective:   There were no  vitals filed for this visit.      Physical Exam   Constitutional: He is oriented to person, place, and time. He appears well-developed and well-nourished.   HENT:   Mouth/Throat: Oropharynx is clear and moist.   Neck: No JVD present. Carotid bruit is not present. No thyromegaly present.   Cardiovascular: Regular rhythm, S1 normal, S2 normal and intact distal pulses.  Exam reveals no gallop, no S3 and no S4.    No murmur heard.  Pulses:       Carotid pulses are 2+ on the right side, and 2+ on the left side.       Radial pulses are 2+ on the right side, and 2+ on the left side.   Pulmonary/Chest: He has wheezes.   Abdominal: Soft. Bowel sounds are normal. He exhibits no mass. There is no tenderness.   Musculoskeletal: He exhibits no edema.   Neurological: He is alert and oriented to person, place, and time.   Skin: Skin is warm and dry. No rash noted.       Lab Review   Lab Results   Component Value Date    GLUCOSE 257 (H) 05/30/2017    BUN 24 (H) 05/30/2017    CREATININE 1.50 (H) 05/30/2017    EGFRIFNONA 47 (L) 05/30/2017    BCR 16.0 05/30/2017    K 4.8 05/30/2017    CO2 30.0 05/30/2017    CALCIUM 9.5 05/30/2017    ALBUMIN 4.30 05/30/2017    LABIL2 1.3 (L) 05/30/2017    AST 19 05/30/2017    ALT 17 05/30/2017     Procedures        Assessment:   Ariel was seen today for coronary artery disease, valvular heart disease and atrial fibrillation.    Diagnoses and all orders for this visit:    Coronary artery disease involving coronary bypass graft of native heart with angina pectoris    Paroxysmal atrial fibrillation    Valvular heart disease    Essential hypertension    Hyperlipidemia LDL goal <70        Impression  1. Congestive heart failure, now resolved status post diuresis with metolazone  2. Age fibrillation paroxysmal asymptomatic  3. Coronary artery disease, no current angina  4. COPD with ongoing tobacco abuse  5. Lipidemia    Plan:  1. Schedule Echocardiogram to further evaluate cardiac status.  And also post  perfusion test for ischemic evaluation  2. Discontinue Lasix.  Continue daily Bumex on hand to be used as needed for swelling.  Use metolazone when necessary  3. Continue current medications.  4. Revisit in 3 MO, or sooner as needed.    Scribed for Fer Hsu MD by Mumtaz Owusu. 6/5/2017  9:18 AM    I, Fer Hsu MD, personally performed the services described in this documentation as scribed by the above individual in my presence, and it is both accurate and complete        Please note that portions of this note may have been completed with a voice recognition program. Efforts were made to edit the dictations, but occasionally words are mistranscribed.

## 2017-06-05 NOTE — TELEPHONE ENCOUNTER
Per TM, notify the PCP about Mr. Ugalde's glucose level. I spoke with Kamla regarding the patient's labs today. Glucose 506. I faxed the labs to [phone]. She said she would relay the message to the patients primary, HERNÁN Alston.

## 2017-07-06 PROBLEM — K56.600 PARTIAL SMALL BOWEL OBSTRUCTION (HCC): Status: ACTIVE | Noted: 2017-01-01

## 2017-07-06 NOTE — PROGRESS NOTES
"Adult Nutrition  Assessment/PES    Patient Name:  Ariel Ugalde  YOB: 1950  MRN: [id number]  Admit Date:  7/6/2017    Assessment Date:  7/6/2017        Reason for Assessment       07/06/17 1117    Reason for Assessment    Reason For Assessment/Visit diagnosis/disease state    Identified At Risk By Screening Criteria dysphagia or difficulty swallowing    Diagnosis Diagnosis    Cardiac CAD;CHF;HTN    Gastrointestinal Vomiting;Nausea;SBO    Pulmonary/Critical Care Obstructive sleep apnea                Anthropometrics       07/06/17 1118    Anthropometrics    Height Method Stated    Height 180.3 cm (71\")    Weight Method Bed scale    Weight 104 kg (229 lb 4.5 oz)    Ideal Body Weight (IBW)    Ideal Body Weight (IBW), Male (kg) 79.27    % Ideal Body Weight 131.46    Body Mass Index (BMI)    BMI (kg/m2) 32.04    BMI Grade 30 - 34.9- obesity - grade I            Labs/Tests/Procedures/Meds       07/06/17 1119    Labs/Tests/Procedures/Meds    Labs/Tests Review Reviewed   High: Glu, BUN, Creat    Medication Review Reviewed, pertinent;Insulin;Anticoag            Physical Findings       07/06/17 1120    Physical Findings/Assessment    Additional Documentation Physical Appearance (Group)    Physical Appearance    Tubes nasogastric tube            Estimated/Assessed Needs       07/06/17 1120    Calculation Measurements    Weight Used For Calculations 85.2 kg (187 lb 12 oz)   ABW    Height Used for Calculations 1.803 m (5' 11\")    Estimated/Assessed Energy Needs    Energy Need Method [employer] [place]    Age 67    RMR ([employer] Jeor Equation) 1648.76    Activity Factors ([place] [employer] or)  Confined to bed  1.2    Estimated Kcal Range  ~8352-8673 kcal    Estimated/Assessed Protein Needs    Weight Used for Protein Calculation 85.2 kg (187 lb 12 oz)    Protein (gm/kg) 1.2    1.2 Gm Protein (gm) 102.2    Estimated Protein Range ~ gm    Estimated/Assessed Fluid Needs    Fluid Need Method RDA method    RDA Method " (mL)  2300            Nutrition Prescription Ordered       07/06/17 1123    Nutrition Prescription PO    Current PO Diet NPO            Evaluation of Received Nutrient/Fluid Intake       07/06/17 1120    PO Evaluation    Number of Days PO Intake Evaluated Insufficient Data   NPO              Problem/Interventions:        Problem 1       07/06/17 1124    Nutrition Diagnoses Problem 1    Problem 1 Altered GI Function    Etiology (related to) Medical Diagnosis    Gastrointestinal SBO    Signs/Symptoms (evidenced by) Report/Observation    Reported/Observed By MD    Appetite Poor    Reported GI Symptoms Nausea and vomiting            Problem 2       07/06/17 1125    Nutrition Diagnoses Problem 2    Problem 2 Overweight/Obesity    Etiology (related to) Factors Affecting Nutrition    Food Habit/Preferences Large Meals    Signs/Symptoms (evidenced by) BMI    BMI 30 - 34.9            Problem 3       07/06/17 1125    Nutrition Diagnoses Problem 3    Problem 3 Impaired Nutrient Utilization    Etiology (related to) Medical Diagnosis    Endocrine DM Type 2    Signs/Symptoms (evidenced by) Biochemical    Specific Labs Noted BUN;Creatinine;Glucose;HgbA1C                Intervention Goal       07/06/17 1126    Intervention Goal    General Meet nutritional needs for age/condition;Provide information regarding MNT for treatment/condition    PO Meet estimated needs;Establish PO;Advance diet    Weight No significant weight loss            Nutrition Intervention       07/06/17 1126    Nutrition Intervention    RD/Tech Action Follow Tx progress;Await begin PO;Recommend/ordered;Care plan reviewd    Recommended/Ordered Diet            Nutrition Prescription       07/06/17 1127    Nutrition Prescription PO    PO Prescription Begin/change diet    Begin/Change Diet to Dysphagia   When medically feasible    New PO Prescription Ordered? No, recommended    Other Orders    Obtain Weight Daily    Obtain Weight Ordered? No, recommended     Supplement Vitamin mineral supplement    Supplement Ordered? No, recommended    Labs Hgb A1c    Labs Ordered? No, recommended            Education/Evaluation       07/06/17 1128    Education    Education Provided education regarding;Education topics    Provided education regarding Healthy eating for diabetes    Education Topics CHO counting;CHF;Cardiac diabetic;Diabetes    Monitor/Evaluation    Monitor PO intake;Per protocol;I&O;Weight;Pertinent labs        Comments:  Rec #1: Consider consulting SLP for dietary modifications secondary to pt's dysphagia or difficulty swallowing. Rec #2: Advance diet per SLP recommendations when medically feasible. Rec #2: Consider MVI with minerals daily. RD to follow pt. Consult RD PRN.     Electronically signed by:  Yuni Jeter RD  07/06/17 11:32 AM

## 2017-07-06 NOTE — CONSULTS
"Adult Nutrition  Assessment/PES    Patient Name:  Ariel Ugalde  YOB: 1950  MRN: [id number]  Admit Date:  7/6/2017    Assessment Date:  7/6/2017        Reason for Assessment       07/06/17 1117    Reason for Assessment    Reason For Assessment/Visit diagnosis/disease state    Identified At Risk By Screening Criteria dysphagia or difficulty swallowing    Diagnosis Diagnosis    Cardiac CAD;CHF;HTN    Gastrointestinal Vomiting;Nausea;SBO    Pulmonary/Critical Care Obstructive sleep apnea                Anthropometrics       07/06/17 1118    Anthropometrics    Height Method Stated    Height 180.3 cm (71\")    Weight Method Bed scale    Weight 104 kg (229 lb 4.5 oz)    Ideal Body Weight (IBW)    Ideal Body Weight (IBW), Male (kg) 79.27    % Ideal Body Weight 131.46    Body Mass Index (BMI)    BMI (kg/m2) 32.04    BMI Grade 30 - 34.9- obesity - grade I            Labs/Tests/Procedures/Meds       07/06/17 1119    Labs/Tests/Procedures/Meds    Labs/Tests Review Reviewed   High: Glu, BUN, Creat    Medication Review Reviewed, pertinent;Insulin;Anticoag            Physical Findings       07/06/17 1120    Physical Findings/Assessment    Additional Documentation Physical Appearance (Group)    Physical Appearance    Tubes nasogastric tube            Estimated/Assessed Needs       07/06/17 1120    Calculation Measurements    Weight Used For Calculations 85.2 kg (187 lb 12 oz)   ABW    Height Used for Calculations 1.803 m (5' 11\")    Estimated/Assessed Energy Needs    Energy Need Method [place]    Age 67    RMR ([place] Jeor Equation) 1648.76    Activity Factors ([place] or)  Confined to bed  1.2    Estimated Kcal Range  ~2418-9836 kcal    Estimated/Assessed Protein Needs    Weight Used for Protein Calculation 85.2 kg (187 lb 12 oz)    Protein (gm/kg) 1.2    1.2 Gm Protein (gm) 102.2    Estimated Protein Range ~ gm    Estimated/Assessed Fluid Needs    Fluid Need Method RDA method    RDA Method " (mL)  2300            Nutrition Prescription Ordered       07/06/17 1123    Nutrition Prescription PO    Current PO Diet NPO            Evaluation of Received Nutrient/Fluid Intake       07/06/17 1120    PO Evaluation    Number of Days PO Intake Evaluated Insufficient Data   NPO              Problem/Interventions:        Problem 1       07/06/17 1124    Nutrition Diagnoses Problem 1    Problem 1 Altered GI Function    Etiology (related to) Medical Diagnosis    Gastrointestinal SBO    Signs/Symptoms (evidenced by) Report/Observation    Reported/Observed By MD    Appetite Poor    Reported GI Symptoms Nausea and vomiting            Problem 2       07/06/17 1125    Nutrition Diagnoses Problem 2    Problem 2 Overweight/Obesity    Etiology (related to) Factors Affecting Nutrition    Food Habit/Preferences Large Meals    Signs/Symptoms (evidenced by) BMI    BMI 30 - 34.9            Problem 3       07/06/17 1125    Nutrition Diagnoses Problem 3    Problem 3 Impaired Nutrient Utilization    Etiology (related to) Medical Diagnosis    Endocrine DM Type 2    Signs/Symptoms (evidenced by) Biochemical    Specific Labs Noted BUN;Creatinine;Glucose;HgbA1C                Intervention Goal       07/06/17 1126    Intervention Goal    General Meet nutritional needs for age/condition;Provide information regarding MNT for treatment/condition    PO Meet estimated needs;Establish PO;Advance diet    Weight No significant weight loss            Nutrition Intervention       07/06/17 1126    Nutrition Intervention    RD/Tech Action Follow Tx progress;Await begin PO;Recommend/ordered;Care plan reviewd    Recommended/Ordered Diet            Nutrition Prescription       07/06/17 1127    Nutrition Prescription PO    PO Prescription Begin/change diet    Begin/Change Diet to Dysphagia   When medically feasible    New PO Prescription Ordered? No, recommended    Other Orders    Obtain Weight Daily    Obtain Weight Ordered? No, recommended     Supplement Vitamin mineral supplement    Supplement Ordered? No, recommended    Labs Hgb A1c    Labs Ordered? No, recommended            Education/Evaluation       07/06/17 1128    Education    Education Provided education regarding;Education topics    Provided education regarding Healthy eating for diabetes    Education Topics CHO counting;CHF;Cardiac diabetic;Diabetes    Monitor/Evaluation    Monitor PO intake;Per protocol;I&O;Weight;Pertinent labs        Comments:  Rec #1: Consider consulting SLP for dietary modifications secondary to pt's dysphagia or difficulty swallowing. Rec #2: Advance diet per SLP recommendations when medically feasible. Rec #2: Consider MVI with minerals daily. RD to follow pt. Consult RD PRN.     Electronically signed by:  Yuni Jeter RD  07/06/17 11:30 AM

## 2017-07-06 NOTE — PROGRESS NOTES
Called by nurse that patient is confused and pulling at IV.  Patient is orientated to person and place but not time.     Patient has no focal deficits or slurry speech.  He does not have a hx of ETOH abuse. ABG shows no CO2 retention. Will check TSH, B12, and Ammonia levels.  FS was WNL. Patient missed his BIPAP nightly yesterday secondary to his NGT for the partial SBO.  Patient currently had pulled out his NGT.  Will place on BIPAP to see if improves his mentation.  Reviewed patients medicine list.  Will consult Dr. Arellano, Neurologist for further recommendations. Patient is afebrile with no leukocytosis. Will order a CXR/UA and BC X 2 to rule out possible infection.  Family suspects that he may have a hx of dementia

## 2017-07-06 NOTE — PLAN OF CARE
Problem: Patient Care Overview (Adult)  Goal: Plan of Care Review  Outcome: Ongoing (interventions implemented as appropriate)    07/06/17 0519   Coping/Psychosocial Response Interventions   Plan Of Care Reviewed With patient   Patient Care Overview   Progress no change   Outcome Evaluation   Outcome Summary/Follow up Plan new admit, NG tube patent, VSS, continue prn meds and IVF's       Goal: Adult Individualization and Mutuality  Outcome: Ongoing (interventions implemented as appropriate)  Goal: Discharge Needs Assessment  Outcome: Ongoing (interventions implemented as appropriate)    Problem: Bowel Obstruction (Adult)  Goal: Signs and Symptoms of Listed Potential Problems Will be Absent or Manageable (Bowel Obstruction)  Outcome: Ongoing (interventions implemented as appropriate)    Problem: Fall Risk (Adult)  Goal: Identify Related Risk Factors and Signs and Symptoms  Outcome: Ongoing (interventions implemented as appropriate)  Goal: Absence of Falls  Outcome: Ongoing (interventions implemented as appropriate)    Problem: Pain, Chronic (Adult)  Goal: Identify Related Risk Factors and Signs and Symptoms  Outcome: Outcome(s) achieved Date Met:  07/06/17  Goal: Acceptable Pain Control/Comfort Level  Outcome: Ongoing (interventions implemented as appropriate)

## 2017-07-06 NOTE — H&P
[facility]      Name:  Ariel Ugalde   Age:  67 y.o.  Sex:  male  :  1950  MRN:  [id number]   Visit Number:  [id number]  Admission Date:  2017  Date Of Service:  17  Primary Care Physician:  HERNÁN Easley    Chief Complaint:     Abdominal pain    History Of Presenting Illness:    Patient is a 67-year-old smoker, presents with 2 day history of abdominal pain.  Last night pain acutely worsened with nausea and vomiting.  Presented to  [place], [place] ER.  Patient was found to have partial small bowel obstruction by CT abdomen and pelvis, please see report.  ER physician discussed the case with Marty Feliciano and patient was transferred here for admission.  NG tube was placed in the emergency room at [facility], with about 1 L of greenish aspirate.  Also note patient has significant cardiac history.  Patient with a history of CABG and cardiac stents.  His last stent was placed in November of last year, .  He is noted to be on Plavix and aspirin which have been held as he is nothing by mouth.  He also has history of diabetes, valvular heart disease, hypertension and congestive heart failure and LARA.  She smokes 2 packs a day.  Denies alcohol use.    Review Of Systems:  Cardiac: No cardiac chest pain, orthopnea, PND, or chest pressure.  No syncope.  Pulmonary: Does have intermittent wheeze but is not new, no cough, no fever, no hemoptysis, no pleurisy.  GI: See history of present illness.  Neuro: no headache, visual changes, numbness or weakness of extremities.  Review of systems otherwise reviewed in detail and felt to be noncontributory.     Past Medical History:    Past Medical History:   Diagnosis Date   • Anxiety    • Arm fracture, left     status post surgical revision in    • COPD (chronic obstructive pulmonary disease)    • Coronary artery disease    • Depression    • Diabetes mellitus     type 2 (currently insulin dependent)    • Dyslipidemia    • Hypertension    • Lower extremity edema    • Obesity    • Osteoarthritis    • Paroxysmal atrial fibrillation 05/06/2014    status post transesophageal echocardiogram and cardioversion, maintaining sinus rhythm by EKG    • Peripheral neuropathy    • Phlebitis of left lower extremity    • Sleep apnea    • Tobacco use    • Valvular heart disease 01/2016    with mild to moderate aortic sclerosis and mild mitral regurgitation with normal ejection fraction by echocardiography,    • Vertigo        Past Surgical history:    Past Surgical History:   Procedure Laterality Date   • CARDIAC CATHETERIZATION N/A 11/17/2016    Procedure: Left Heart Cath;  Surgeon: Fer Hsu MD;  Location: [facility] CATH INVASIVE LOCATION;  Service:    • SMALL INTESTINE SURGERY         Social History:    Social History     Social History   • Marital status:      Spouse name: N/A   • Number of children: N/A   • Years of education: N/A     Occupational History   • Not on file.     Social History Main Topics   • Smoking status: Current Every Day Smoker     Packs/day: 1.00     Types: Cigarettes   • Smokeless tobacco: Never Used   • Alcohol use No   • Drug use: No   • Sexual activity: Defer     Other Topics Concern   • Not on file     Social History Narrative       Family History:    Family History   Problem Relation Age of Onset   • Diabetes Mother    • Hypertension Mother    • Heart disease Mother      ischemic   • Cancer Mother    • Stroke Father    • Diabetes Father    • Hypertension Father    • Heart disease Father      ischemic   • Cancer Father    • Anxiety disorder Sister    • Alcohol abuse Brother    • Stroke Brother        Allergies:      Prednisone    Home Medications:    Prior to Admission Medications     Prescriptions Last Dose Informant Patient Reported? Taking?    ALPRAZolam (XANAX) 1 MG tablet 7/5/2017  Yes Yes    Take 1 mg by mouth 2 (Two) Times a Day.    amitriptyline (ELAVIL) 25 MG tablet 7/5/2017  Yes  Yes    Take 25 mg by mouth At Night As Needed for Sleep.    amLODIPine (NORVASC) 10 MG tablet 7/5/2017  Yes Yes    Take 10 mg by mouth daily.    aspirin  MG tablet 7/5/2017  Yes Yes    Take 325 mg by mouth Daily.    aspirin EC 81 MG EC tablet   No No    Take 1 tablet by mouth Daily for 360 days.    bumetanide (BUMEX) 1 MG tablet 7/5/2017  No Yes    Take 1 tablet by mouth 2 (Two) Times a Day.    carvedilol (COREG) 12.5 MG tablet   No No    Take 1 tablet by mouth 2 (Two) Times a Day With Meals for 360 days.    clonazePAM (KlonoPIN) 0.5 MG tablet 7/5/2017  Yes Yes    Take 0.5 mg by mouth At Night As Needed for Seizures.    clopidogrel (PLAVIX) 75 MG tablet 7/5/2017  No Yes    Take 1 tablet by mouth Daily for 360 days.    cyclobenzaprine (FLEXERIL) 10 MG tablet Unknown  Yes No    Take 10 mg by mouth At Night As Needed for Muscle Spasms.    gabapentin (NEURONTIN) 800 MG tablet 7/5/2017 Pharmacy Yes Yes    Take 800 mg by mouth 4 (Four) Times a Day.    HYDROcodone-acetaminophen (NORCO)  MG per tablet 7/5/2017 Pharmacy Yes Yes    Take 1 tablet by mouth Every 4 (Four) Hours As Needed for moderate pain (4-6).    insulin aspart (novoLOG) 100 UNIT/ML injection 7/5/2017 Pharmacy Yes Yes    Inject 10 Units under the skin 3 (Three) Times a Day Before Meals.    insulin glargine (LANTUS) 100 UNIT/ML injection   Yes Yes    Inject 60 Units under the skin 2 (Two) Times a Day.    isosorbide mononitrate (IMDUR) 120 MG 24 hr tablet 7/5/2017  No Yes    Take 1 tablet by mouth Daily for 360 days.    levothyroxine (SYNTHROID, LEVOTHROID) 50 MCG tablet 7/5/2017  Yes Yes    Take 50 mcg by mouth Daily.    metFORMIN (GLUCOPHAGE) 500 MG tablet 7/5/2017  Yes Yes    Take 500 mg by mouth 2 (Two) Times a Day With Meals.    metOLazone (ZAROXOLYN) 5 MG tablet Unknown  No No    Take 1 tablet by mouth Daily As Needed (Swelling).    metoprolol tartrate (LOPRESSOR) 50 MG tablet 7/5/2017  Yes Yes    Take 50 mg by mouth 2 (Two) Times a Day.     NIACIN ER PO 7/5/2017  Yes Yes    Take 500 mg by mouth Daily.    nitroglycerin (NITROSTAT) 0.4 MG SL tablet Unknown Pharmacy Yes No    Place 0.4 mg under the tongue Every 5 (Five) Minutes As Needed for chest pain. Take no more than 3 doses in 15 minutes.    ondansetron ODT (ZOFRAN-ODT) 4 MG disintegrating tablet Unknown  Yes No    Take 4 mg by mouth Every 8 (Eight) Hours As Needed for Nausea or Vomiting.    pantoprazole (PROTONIX) 40 MG EC tablet 7/5/2017 Pharmacy Yes Yes    Take 40 mg by mouth Daily.    PARoxetine (PAXIL) 20 MG tablet 7/5/2017 Pharmacy Yes Yes    Take 20 mg by mouth Every Morning.    potassium chloride (K-DUR,KLOR-CON) 20 MEQ CR tablet 7/5/2017 Pharmacy Yes Yes    Take 20 mEq by mouth 2 (Two) Times a Day.    ROSUVASTATIN CALCIUM PO 7/5/2017  Yes Yes    Take 20 mg by mouth Daily.    sitaGLIPtin-metFORMIN (JANUMET)  MG per tablet  Pharmacy Yes No    Take 1 tablet by mouth 2 (Two) Times a Day With Meals.    tamsulosin (FLOMAX) 0.4 MG capsule 24 hr capsule 7/5/2017 Pharmacy Yes Yes    Take 1 capsule by mouth Daily. Take 1/2 hour following the same meal every day    Unable to find Unknown  Yes No    1 each At Night As Needed. Med Name: CPAP MACHINE    vitamin D (ERGOCALCIFEROL) 98427 UNITS capsule capsule   Yes No    Take 50,000 Units by mouth 1 (One) Time Per Week. Takes on Fridays             ED Medications:    Medications - No data to display    Vital Signs:    Temp:  [98.1 °F (36.7 °C)] 98.1 °F (36.7 °C)  Heart Rate:  [76] 76  Resp:  [18] 18  BP: (115)/(72) 115/72    Last 3 weights    07/06/17  0453   Weight: 228 lb 14.4 oz (104 kg)       Body mass index is 31.93 kg/(m^2).    Physical Exam:    General Appearance:  Alert and cooperative, not in any acute distress.   Head:  Atraumatic and normocephalic, without obvious abnormality.   Eyes:          PERRLA, conjunctivae and sclerae normal, no Icterus. No pallor. Extra-occular movements are within normal limits.   Throat: No oral lesions, no  thrush, oral mucosa dry and poor dentition.   Neck: Supple, trachea midline, no thyromegaly, no carotid bruit. No JVD.   Back:   No kyphoscoliosis present. No tenderness to palpation,   range of motion normal.   Lungs:   Chest shape is normal. Breath sounds heard bilterally equally.  + wheezing. No Pleural rub or bronchial breathing.    Heart:  Normal S1 and S2, 2/6 MARIIA, RUSB, no gallop, no rub. No JVD. Regular rate and rhythm.   Abdomen:   Scant bowel sounds, no masses, no organomegaly.  Slightly distended, no guarding, no rebound tenderness.   Extremities: Moves all extremities well, no edema, no cyanosis, no clubbing.   Pulses: Pulses palpable and equal bilaterally.   Skin: No bleeding, bruising or rash.   Neurologic: Alert and oriented x 3. Moves all four limbs equally. No tremors. No facial asymetry. Cranial nerves 2-12 intact. Musculosensory exam intact.   Psychiatric: Mood and affect normal. Denies homicidal or suicidal ideations.     Laboratory data:    I have reviewed the labs done in the emergency room.          Invalid input(s): LABALBU, PROT                                    Invalid input(s): USDES,  BLOODU, NITRITITE, BACT, EP  Pain Management Panel     Pain Management Panel Latest Ref Rng & Units 1/5/2016    AMPHETAMINES SCREEN, URINE Negative ng/mL Negative    BARBITURATES SCREEN Negative ng/mL Negative    BENZODIAZEPINE SCREEN, URINE Negative ng/mL Negative    BUPRENORPHINE Negative ng/mL Negative    COCAINE SCREEN, URINE Negative ng/mL Negative    METHADONE SCREEN, URINE Negative ng/mL Negative    METHAMPHETAMINE UR Negative ng/mL Negative              EKG:          Radiology:    Imaging Results (last 72 hours)     ** No results found for the last 72 hours. **          Assessment:    1.  Partial small bowel obstruction.  2.  History of coronary artery disease with CABG and history of cardiac stents, last cardiac stents placed in November 2016.  Patient is noted to be on Plavix and aspirin which  have been held as he is nothing by mouth.  3.  History of valvular heart disease  4.  History of congestive heart failure  5.  History of hypertension  6.  History of COPD/tobacco abuse, 2 packs per day  7.  History of diabetes mellitus on insulin at home    Plan:     Admit.  NG tube to low wall suction.  Hold medications at the present time as he is nothing by mouth given his partial small bowel obstruction and NG tube placement. He is on Plavix and aspirin, which will need to be reinstituted once small bowel obstruction resolves.  Consult Dr. Feliciano, although his partial small bowel obstruction may resolve without surgical intervention.  IV fluids and pain control.  BIPAP will need to be reinstituted once NG tube removed.    PLEASE NOTE , I DID NOT RECONCILE ANY OF HIS HOME MEDS, AS HE IS NPO SECONDARY TO PARTIAL SBO. THESE WILL NEED TO BE RECONCILED WHEN PATIENT TAKING PO.      Roman Coy MD  07/06/17  5:25 AM    Portions of this note may have been completed with Dragon, a voice recognition program. Not all errors in transcription may have been detected prior to signing.

## 2017-07-06 NOTE — PLAN OF CARE
Problem: Patient Care Overview (Adult)  Goal: Plan of Care Review  Outcome: Ongoing (interventions implemented as appropriate)    Problem: Bowel Obstruction (Adult)  Goal: Signs and Symptoms of Listed Potential Problems Will be Absent or Manageable (Bowel Obstruction)  Outcome: Ongoing (interventions implemented as appropriate)    Problem: Fall Risk (Adult)  Goal: Identify Related Risk Factors and Signs and Symptoms  Outcome: Ongoing (interventions implemented as appropriate)  Goal: Absence of Falls  Outcome: Ongoing (interventions implemented as appropriate)    Problem: Pain, Chronic (Adult)  Goal: Acceptable Pain Control/Comfort Level  Outcome: Ongoing (interventions implemented as appropriate)

## 2017-07-06 NOTE — PROGRESS NOTES
Continued Stay Note  IAN Marquis     Patient Name: Ariel Ugalde  MRN: [id number]  Today's Date: 7/6/2017    Admit Date: 7/6/2017          Discharge Plan       07/06/17 1529    Case Management/Social Work Plan    Plan Pt sitting up in chair at time of visit.  Daughter at bedside.  Reports lives with spouse in single level house.  Has home O2 which he uses continuously (90% of time) @ thinks 3L.  Has CPAP and nebulizer.  [facility] is DME.  Has walker and straight cane.  No HH at this time.  Has had in past but was not able to recall name.  Informed CM will continue to follow for DC needs.    Patient/Family In Agreement With Plan yes    Additional Comments Plans to return home at DC              Discharge Codes     None            Amna Brown

## 2017-07-06 NOTE — PROGRESS NOTES
[facility]    Name:  Ariel Ugalde   Age:  67 y.o.  Sex:  male  :  1950  MRN:  [id number]   Visit Number:  [id number]  Admission Date:  2017  Date Of Service:  17  Primary Care Physician:  HERNÁN Easley     LOS: 0 days :  Patient Care Team:  HERNÁN Mchugh as PCP - General (Nurse Practitioner)  Vandana Jones MD as PCP - Claims Attributed:    Chief Complaint:      Epigastric pain    Subjective / Interval History:     Patients diffuse abdominal pain after pain medicines is at 6/10 but reports it being much improved from the pain of 10/10.  Currently no n/v.  NGT in place with low to intermittent suction.  Patient has had a BM yesterday with no blood.  No BM today.  Poor appetite.  He reports being his first episode of having a partial small bowel obstruction.  He has had abdominal surgeries in the past.  Surgery, Dr. Feliciano is following. No current cp or soa.     Vital Signs:    Temp:  [97.9 °F (36.6 °C)-98.1 °F (36.7 °C)] 97.9 °F (36.6 °C)  Heart Rate:  [76-84] 82  Resp:  [18] 18  BP: (115-129)/(71-72) 129/71    Intake and output:    Intake/Output     None          Physical Examination:    General Appearance:  Alert and cooperative    Head:  Atraumatic and normocephalic, without obvious abnormality.   Eyes:      PERRLA, Extra-occular movements are within normal limits.   Lungs:   Chest shape is normal. Breath sounds heard bilaterally equally.  No crackles or wheezing.   Heart:  Normal S1 and S2, no murmur, no gallop, no rub.   Abdomen:   Decreased bowel sounds, Diffuse abdominal tenderness on palpation, no guarding, no rebound tenderness   Extremities: No edema                     Laboratory results:      Results from last 7 days  Lab Units 17  0750   SODIUM mmol/L 142   POTASSIUM mmol/L 4.9   CHLORIDE mmol/L 100   CO2 mmol/L 26.0   BUN mg/dL 32*   CREATININE mg/dL 1.40*   CALCIUM mg/dL 9.5   GLUCOSE mg/dL 164*       Results from last 7  days  Lab Units 07/06/17  0750   WBC 10*3/mm3 4.60*   HEMOGLOBIN g/dL 12.0*   HEMATOCRIT % 41.3*   PLATELETS 10*3/mm3 154               I have reviewed the patient's laboratory results.    Radiology results:    Imaging Results (last 24 hours)     Procedure Component Value Units Date/Time    CT Abdomen Pelvis Without Contrast [[id number]] Updated:  07/06/17 0907          I have reviewed the patient's radiology reports.    Medication Review:     I have reviewed the patients active and prn medications.       Assessment/Plan:     1. Partial small bowel obstruction.  2. History of coronary artery disease with CABG and history of cardiac stents, last cardiac stents placed in November 2016.   3.  History of valvular heart disease  4. History of congestive heart failure  5. History of hypertension  6. History of COPD/tobacco abuse, 2 packs per day  7.  History of diabetes mellitus on insulin at home     Plan:      Continue with NG tube to low wall suction, IV fluids, pain meds, and antiemetics.  Holding plavix for now until evaluated by Dr. Feliciano, to see if may need surgery.  Holding insulin therapy with SSI coverage as currently NPO, FS stable.        Roshan Shukla MD  07/06/17  10:54 AM

## 2017-07-07 NOTE — PLAN OF CARE
Problem: Patient Care Overview (Adult)  Goal: Plan of Care Review  Outcome: Ongoing (interventions implemented as appropriate)    07/07/17 3486   Coping/Psychosocial Response Interventions   Plan Of Care Reviewed With patient;spouse   Patient Care Overview   Progress improving   Outcome Evaluation   Outcome Summary/Follow up Plan denies n/v this shift. pt states he is feeling better, pain medication changed to home PO dose.

## 2017-07-07 NOTE — PLAN OF CARE
Problem: Patient Care Overview (Adult)  Goal: Plan of Care Review  Outcome: Ongoing (interventions implemented as appropriate)    07/07/17 0412   Coping/Psychosocial Response Interventions   Plan Of Care Reviewed With patient;spouse   Patient Care Overview   Progress improving   Outcome Evaluation   Outcome Summary/Follow up Plan NGT OUT, NO NAUSEA OR VOMITING THIS SHIFT       Goal: Adult Individualization and Mutuality  Outcome: Ongoing (interventions implemented as appropriate)  Goal: Discharge Needs Assessment  Outcome: Ongoing (interventions implemented as appropriate)    Problem: Bowel Obstruction (Adult)  Goal: Signs and Symptoms of Listed Potential Problems Will be Absent or Manageable (Bowel Obstruction)  Outcome: Ongoing (interventions implemented as appropriate)    Problem: Fall Risk (Adult)  Goal: Identify Related Risk Factors and Signs and Symptoms  Outcome: Ongoing (interventions implemented as appropriate)  Goal: Absence of Falls  Outcome: Ongoing (interventions implemented as appropriate)    Problem: Pain, Chronic (Adult)  Goal: Acceptable Pain Control/Comfort Level  Outcome: Ongoing (interventions implemented as appropriate)

## 2017-07-07 NOTE — CONSULTS
[facility] NEUROLOGY [place] CONSULTATION   History of Present Illness     Date: 7/6/2017    Patient Identification  Ariel Ugalde is a 67 y.o. male.    Patient information was obtained from patient and relative(s).  History/Exam limitations: none.    CONSULTATION requested by: Cuong Vazquez MD      Chief Complaint   No chief complaint on file.      History of Present Illness   Patient is a pleasant 67-year-old gentleman with history of tremor and gait difficulty described by his daughter who is a pharmacy student as shuffling in nature.  She described patient has parkinsonian symptoms along with progressive cognitive decline over the last 2 years.    Patient is admitted to the hospital for acute abdominal pain.  Patient was admitted to the hospital for small bowel obstruction workup.  After being admitted to hospital patient was noted to be more confused especially worse at night.  Required Geodon.  His daughter also reported that his mentation fluctuate from hours to hours and day today.  His daughter reported that patient can be very confused and 1 day and quite agitated for few hours and almost as alert as he used to be the next.      PMH:   Past Medical History:   Diagnosis Date   • Anxiety    • Arm fracture, left     status post surgical revision in 1996   • COPD (chronic obstructive pulmonary disease)    • Coronary artery disease    • Depression    • Diabetes mellitus     type 2 (currently insulin dependent)   • Dyslipidemia    • Hypertension    • Lower extremity edema    • Obesity    • Osteoarthritis    • Paroxysmal atrial fibrillation 05/06/2014    status post transesophageal echocardiogram and cardioversion, maintaining sinus rhythm by EKG    • Peripheral neuropathy    • Phlebitis of left lower extremity    • Sleep apnea    • Tobacco use    • Valvular heart disease 01/2016    with mild to moderate aortic sclerosis and mild mitral regurgitation with normal ejection fraction by echocardiography,    •  Vertigo        Past Surgical History:   Past Surgical History:   Procedure Laterality Date   • CARDIAC CATHETERIZATION N/A 11/17/2016    Procedure: Left Heart Cath;  Surgeon: Fer Hsu MD;  Location: [facility] CATH INVASIVE LOCATION;  Service:    • SMALL INTESTINE SURGERY         Family Hisotry:   Family History   Problem Relation Age of Onset   • Diabetes Mother    • Hypertension Mother    • Heart disease Mother      ischemic   • Cancer Mother    • Stroke Father    • Diabetes Father    • Hypertension Father    • Heart disease Father      ischemic   • Cancer Father    • Anxiety disorder Sister    • Alcohol abuse Brother    • Stroke Brother        Social History:   Social History     Social History   • Marital status:      Spouse name: N/A   • Number of children: N/A   • Years of education: N/A     Occupational History   • Not on file.     Social History Main Topics   • Smoking status: Current Every Day Smoker     Packs/day: 1.00     Types: Cigarettes   • Smokeless tobacco: Never Used   • Alcohol use No   • Drug use: No   • Sexual activity: Defer     Other Topics Concern   • Not on file     Social History Narrative       Medications:   Current Facility-Administered Medications   Medication Dose Route Frequency Provider Last Rate Last Dose   • ALPRAZolam (XANAX) tablet 1 mg  1 mg Oral Q12H Roshan Shukla MD   1 mg at 07/06/17 1257   • amitriptyline (ELAVIL) tablet 25 mg  25 mg Oral Nightly PRN Roshan Shukla MD   25 mg at 07/06/17 2124   • aspirin EC tablet 81 mg  81 mg Oral Daily Roshan Shukla MD   81 mg at 07/06/17 1258   • atorvastatin (LIPITOR) tablet 40 mg  40 mg Oral Nightly Roshan Shukla MD   40 mg at 07/06/17 2124   • dextrose (D50W) solution 25 g  25 g Intravenous Q15 Min PRN Roman Coy MD       • dextrose (GLUTOSE) oral gel 15 g  15 g Oral Q1H PRN Roman Coy MD       • enoxaparin (LOVENOX) syringe 40 mg  40 mg Subcutaneous Daily Roman Coy MD   40 mg at  07/06/17 0934   • glucagon (human recombinant) (GLUCAGEN DIAGNOSTIC) injection 1 mg  1 mg Subcutaneous Q15 Min PRN Roman Coy MD       • insulin aspart (novoLOG) injection 0-9 Units  0-9 Units Subcutaneous 4x Daily AC & at Bedtime Roman Coy MD       • ipratropium-albuterol (DUO-NEB) nebulizer solution 3 mL  3 mL Nebulization 4x Daily - RT Roman Coy MD   3 mL at 07/06/17 1915   • [START ON 7/7/2017] levothyroxine (SYNTHROID, LEVOTHROID) tablet 50 mcg  50 mcg Oral Q AM Roshan Shukla MD       • morphine injection 2 mg  2 mg Intravenous Q4H PRN Roman Coy MD   2 mg at 07/06/17 0557    And   • naloxone (NARCAN) injection 0.4 mg  0.4 mg Intravenous Q5 Min PRN Roman Coy MD       • nicotine (NICODERM CQ) 21 MG/24HR patch 1 patch  1 patch Transdermal Q24H Roshan Shukla MD   1 patch at 07/06/17 1354   • ondansetron (ZOFRAN) injection 4 mg  4 mg Intravenous Q6H PRN Roman Coy MD   4 mg at 07/06/17 0557   • [START ON 7/7/2017] pantoprazole (PROTONIX) EC tablet 40 mg  40 mg Oral Q AM Roshan Shukla MD       • PARoxetine (PAXIL) tablet 20 mg  20 mg Oral QAM Roshan Shukla MD   20 mg at 07/06/17 1258   • Pharmacy Meds to Bed Consult   Does not apply Daily Dilia Talbot RPH       • sodium chloride 0.9 % flush 1-10 mL  1-10 mL Intravenous PRN Roman Coy MD       • sodium chloride 0.9 % infusion  75 mL/hr Intravenous Continuous Roman Coy MD 75 mL/hr at 07/06/17 0603 75 mL/hr at 07/06/17 0603   • tamsulosin (FLOMAX) 24 hr capsule 0.4 mg  0.4 mg Oral Daily Roshan Shukla MD   0.4 mg at 07/06/17 1257       Allergy:   Allergies   Allergen Reactions   • Prednisone Other (See Comments)     Chest pain       Review of Systems:  Review of Systems   Constitutional: Positive for fatigue. Negative for chills and fever.   HENT: Negative for congestion, ear pain, hearing loss, rhinorrhea and sore throat.    Eyes: Negative for pain, discharge  "and redness.   Respiratory: Negative for cough, shortness of breath, wheezing and stridor.    Cardiovascular: Negative for chest pain, palpitations and leg swelling.   Gastrointestinal: Positive for abdominal distention, abdominal pain, nausea and vomiting. Negative for constipation.   Endocrine: Negative for cold intolerance, heat intolerance and polyphagia.   Genitourinary: Negative for dysuria, flank pain, frequency and urgency.   Musculoskeletal: Positive for gait problem. Negative for joint swelling, myalgias, neck pain and neck stiffness.   Skin: Negative for pallor, rash and wound.   Allergic/Immunologic: Negative for environmental allergies.   Neurological: Positive for tremors and weakness. Negative for dizziness, seizures, syncope, facial asymmetry, speech difficulty, light-headedness, numbness and headaches.   Hematological: Negative for adenopathy.   Psychiatric/Behavioral: Positive for agitation, behavioral problems, confusion, decreased concentration, dysphoric mood, hallucinations and sleep disturbance. The patient is nervous/anxious and is hyperactive.        Physical Exam     Vitals:    07/06/17 1118 07/06/17 1225 07/06/17 1559 07/06/17 2129   BP:    128/66   BP Location:    Left arm   Patient Position:    Lying   Pulse:  84 95 76   Resp:  18 16 18   Temp:    99.1 °F (37.3 °C)   TempSrc:    Oral   SpO2:  95% 95% 93%   Weight: 229 lb 4.5 oz (104 kg)      Height: 71\" (180.3 cm)        GENERAL: Patient is pleasant, cooperative, appears to be stated age.    SKIN AND EXTREMITIES:  No skin rashes or lesions are noted.  No cyanosis, clubbing or edema of the extremities.    HEAD:  Head is normocephalic and atraumatic.    NECK: Neck are non-tender without thyromegaly or adenopathy.  Carotic upstrokes are 1+/4.  No cranial or cervical bruits.  The neck is supple with a full range of motion.   ENT: palate elevate symmetrically, no evidence of high arch palate, tongue midline erythema in posterior pharynx, " Mallampati Classification Class III   CARDIOVASCULAR:  Regular rate and rhythm with normal S1 and S2 without rub or gallop.  RESPIRATORY:  Clear to auscultation without wheezes or crackle   ABDOMEN:  Soft and non-tender, positive bowel sound without hepatosplenomegaly  BACK:  Back is straight without midline defect.    PSYCH:  Higher cortical function/mental status:  The patient is alert.  He is oriented to person and place but not the year but oriented to month but not the date.   There is no visual or auditory hallucination or suicidal or homicidal ideation.  SPEECH:There is no gross evidence of aphasia, dysarthria or agnosia.      CRANIAL NERVES:  Pupils are 4mm, equal round reactive to light, reacting briskly to 2mm without afferent pupillary defect.  Visual fields are intact to confrontation testing.  Fundoscopic examination reveals sharp disk margins with normal vasculature.  No papilledema, hemorrhages or exudates.  Extraocular movements are full and smooth with normal pursuits and saccades.  No nystagmus noted.  The face is symmetric. palate elevate symmetrically, Tongue midline, positive gag reflex. The remainder of the cranial nerves are intact and symmetrical.    MOTOR: Strength is 5/5 throughout with normal tone and bulk with the following exceptions, 4/5 intrinsic muscles of the hands and feet.  Rest tremor is noted   Deep Tendon Reflexes: are 2/4 and symmetrical in the upper extremities, 2/4 and symmetrical at the knees and 1/4 and symmetrical at the Achilles tendon.  Plantar responses were down-going bilaterally.    SENSATION:  Intact to pinprick, light touch, vibration and proprioception.  Coordination:  The patient normally performs finger-nose-finger, heel-to-knee-to-shin and rapid alternating movements in symmetrical fashion.    COORDINATION AND GAIT:  Station and gait did not test  MUSCULOSKELETAL: Range of motion normal, no clubbing, cyanosis, or edema.  No joint swelling.            Studies: I  have personally reviewed the following and discussed with the patient.  Results for orders placed or performed during the hospital encounter of 07/06/17   CBC (No Diff)   Result Value Ref Range    WBC 4.60 (L) 4.80 - 10.80 10*3/mm3    RBC 5.44 4.70 - 6.10 10*6/mm3    Hemoglobin 12.0 (L) 14.0 - 18.0 g/dL    Hematocrit 41.3 (L) 42.0 - 52.0 %    MCV 75.9 (L) 80.0 - 94.0 fL    MCH 22.1 (L) 27.0 - 31.0 pg    MCHC 29.1 (L) 30.0 - 37.0 g/dL    RDW 18.2 (H) 11.5 - 14.5 %    RDW-SD 48.1 37.0 - 54.0 fl    MPV 9.6 6.0 - 12.0 fL    Platelets 154 130 - 400 10*3/mm3   Basic Metabolic Panel   Result Value Ref Range    Glucose 164 (H) 74 - 98 mg/dL    BUN 32 (H) 7 - 20 mg/dL    Creatinine 1.40 (H) 0.60 - 1.30 mg/dL    Sodium 142 137 - 145 mmol/L    Potassium 4.9 3.5 - 5.1 mmol/L    Chloride 100 98 - 107 mmol/L    CO2 26.0 26.0 - 30.0 mmol/L    Calcium 9.5 8.4 - 10.2 mg/dL    eGFR Non African Amer 51 (L) >60 mL/min/1.73    BUN/Creatinine Ratio 22.9 (H) 6.3 - 21.9    Anion Gap 20.9 mmol/L   Blood Gas, Arterial With Co-Ox   Result Value Ref Range    Site Arterial Line     Manoj's Test Positive     pH, Arterial 7.543 pH units    pCO2, Arterial 30.7 (L) 35.0 - 45.0 mm Hg    pO2, Arterial 71.3 (L) 75.0 - 100.0 mm Hg    HCO3, Arterial 26.4 22.0 - 28.0 mmol/L    Base Excess, Arterial 3.9 mmol/L    O2 Saturation, Arterial 96.5 %    Hemoglobin, Blood Gas 11.5 (L) 12 - 18 g/dL    Oxyhemoglobin 93.1 (L) 94 - 99 %    Methemoglobin 0.7 %    Carboxyhemoglobin 2.8 %    Modality Room air     FIO2 21 %   TSH   Result Value Ref Range    TSH 2.430 0.470 - 4.680 mIU/mL   Vitamin B12   Result Value Ref Range    Vitamin B-12 443 239 - 931 pg/mL   Ammonia   Result Value Ref Range    Ammonia 45 (H) 9 - 30 umol/L   POC Glucose Fingerstick   Result Value Ref Range    Glucose 143 (H) 70 - 130 mg/dL   POC Glucose Fingerstick   Result Value Ref Range    Glucose 157 (H) 70 - 130 mg/dL   POC Glucose Fingerstick   Result Value Ref Range    Glucose 141 (H) 70 - 130  mg/dL   POC Glucose Fingerstick   Result Value Ref Range    Glucose 127 70 - 130 mg/dL   POC Glucose Fingerstick   Result Value Ref Range    Glucose 152 (H) 70 - 130 mg/dL       Records Reviewed: I have personally reviewed his previous medical record.    IMPRESSION:  1.  Fluctuating cognition  2.  Parkinsonian symptoms with rest tremor and shuffling gait reported by daughter  3 progressive cognitive decline over the last 2 years  4.  These constellation of symptoms is most consistent with Lewy body dementia especially fluctuating cognition and with parkinsonian symptoms and progressive cognitive decline.     Treatments:  1.  I have  his daughter that usually body dementia , unlike Alzheimer disease , which tends to progress gradually, but Lewy body dementia often start rapidly with a fast decline  2.  The patient can survive  6-7 year with the disease .   3.  Lewy body dementia often have worse sundowning syndrome than Alzheimer disease   4.  There are more prone to develop REM sleep behavioral disorder  5.  Our office is the only selected site for the research of REM sleep behavioral disorder among Lewy body patient.  This patient would be excellent candidate.    6.  Patient may benefit from Geodon or Seroquel for agitation  7.  Discussed clinical trial is ongoing with [facility] along with our  neurologists.    8.  I will be happy to see to patient back in my office for follow-up visit .    This Document is signed by Roshan Arellano MD, FAAN, FAASM July 6, 20179:32 PM

## 2017-07-07 NOTE — PLAN OF CARE
Problem: NPPV/CPAP (Adult)  Intervention: Prevent Aspiration During Therapy    07/07/17 0303   Positioning   Head Of Bed (HOB) Position HOB elevated       Intervention: Assess/Manage Patient Tolerance of Noninvasive Ventilation    07/07/17 0434   Respiratory Interventions   Airway/Ventilation Management airway patency maintained       Intervention: Prevent/Minimize Device Friction/Shearing and Pressure Points    07/07/17 0434   Respiratory Interventions   NPPV/CPAP Maintenance mask adjusted         Goal: Signs and Symptoms of Listed Potential Problems Will be Absent or Manageable (NPPV/CPAP)  Outcome: Ongoing (interventions implemented as appropriate)    07/07/17 0434   NPPV/CPAP   Problems Assessed (NPPV/CPAP) hypoxia/hypoxemia;situational response;skin breakdown;dry mucous membranes   Problems Present (NPPV/CPAP) none

## 2017-07-07 NOTE — PROGRESS NOTES
LOS: 1 day   Patient Care Team:  HERNÁN Mchugh as PCP - General (Nurse Practitioner)  Vandana Jones MD as PCP - Claims Attributed        Subjective  the patient states he feels better, he still has diarrhea    Interval History:     Patient Complaints: See above    History taken from: patient family RN    Vital Signs  Temp:  [97.9 °F (36.6 °C)-99.1 °F (37.3 °C)] 97.9 °F (36.6 °C)  Heart Rate:  [68-95] 68  Resp:  [16-20] 20  BP: (125-129)/(66-71) 125/70    Physical Exam:     General Appearance:    Alert, cooperative, in no acute distress   Head:    Normocephalic, without obvious abnormality, atraumatic   Lungs:     Clear to auscultation,respirations regular, even and                  unlabored    Heart:    Regular rhythm and normal rate, normal S1 and S2, no            murmur, no gallop, no rub, no click   Abdomen:     Normal bowel sounds, no masses, no organomegaly, soft        non-tender, non-distended, no guarding, no rebound                tenderness   Extremities:   Moves all extremities well, no edema, no cyanosis, no             redness   Pulses:   Pulses palpable and equal bilaterally   Skin:   No bleeding, bruising or rash        Results Review:       Lab Results (last 24 hours)     Procedure Component Value Units Date/Time    CBC (No Diff) [[id number]]  (Abnormal) Collected:  07/06/17 0750    Specimen:  Blood Updated:  07/06/17 0756     WBC 4.60 (L) 10*3/mm3      RBC 5.44 10*6/mm3      Hemoglobin 12.0 (L) g/dL      Hematocrit 41.3 (L) %      MCV 75.9 (L) fL      MCH 22.1 (L) pg      MCHC 29.1 (L) g/dL      RDW 18.2 (H) %      RDW-SD 48.1 fl      MPV 9.6 fL      Platelets 154 10*3/mm3     Basic Metabolic Panel [[id number]]  (Abnormal) Collected:  07/06/17 0750    Specimen:  Blood Updated:  07/06/17 0807     Glucose 164 (H) mg/dL      BUN 32 (H) mg/dL      Creatinine 1.40 (H) mg/dL      Sodium 142 mmol/L      Potassium 4.9 mmol/L      Chloride 100 mmol/L      CO2 26.0 mmol/L      Calcium 9.5 mg/dL       eGFR Non African Amer 51 (L) mL/min/1.73      BUN/Creatinine Ratio 22.9 (H)     Anion Gap 20.9 mmol/L     Narrative:       Abnormal estimated GFR should be followed by more specific studies to confirm end stage chronic renal disease. The equation used for calculation may not be accurate for patients less than 19 years old, greater than 70 years old, patients at extremes of weight, malnutrition, or with acute renal dysfunction.    POC Glucose Fingerstick [[id number]]  (Abnormal) Collected:  07/06/17 1050    Specimen:  Blood Updated:  07/06/17 1100     Glucose 157 (H) mg/dL       Serial Number: [id number]    : [id number]       Blood Gas, Arterial With Co-Ox [[id number]]  (Abnormal) Collected:  07/06/17 1348    Specimen:  Arterial Blood Updated:  07/06/17 1349     Site Arterial Line     Manoj's Test Positive     pH, Arterial 7.543 pH units      pCO2, Arterial 30.7 (L) mm Hg      pO2, Arterial 71.3 (L) mm Hg      HCO3, Arterial 26.4 mmol/L      Base Excess, Arterial 3.9 mmol/L      O2 Saturation, Arterial 96.5 %      Hemoglobin, Blood Gas 11.5 (L) g/dL      Oxyhemoglobin 93.1 (L) %      Methemoglobin 0.7 %      Carboxyhemoglobin 2.8 %      Modality Room air     FIO2 21 %     POC Glucose Fingerstick [[id number]]  (Abnormal) Collected:  07/06/17 1412    Specimen:  Blood Updated:  07/06/17 1415     Glucose 141 (H) mg/dL       Serial Number: [id number]    : [id number]       TSH [[id number]]  (Normal) Collected:  07/06/17 0750    Specimen:  Blood Updated:  07/06/17 1525     TSH 2.430 mIU/mL     POC Glucose Fingerstick [[id number]]  (Normal) Collected:  07/06/17 1552    Specimen:  Blood Updated:  07/06/17 1610     Glucose 127 mg/dL       Serial Number: [id number]    : [id number]       Ammonia [[id number]]  (Abnormal) Collected:  07/06/17 1654    Specimen:  Blood Updated:  07/06/17 1740     Ammonia 45 (H) umol/L     Vitamin B12 [[id number]]  (Normal) Collected:  07/06/17 1654    Specimen:  Blood Updated:  07/06/17 1942      Vitamin B-12 443 pg/mL     POC Glucose Fingerstick [[id number]]  (Abnormal) Collected:  07/06/17 2017    Specimen:  Blood Updated:  07/06/17 2036     Glucose 152 (H) mg/dL       Serial Number: [id number]    : [id number]       Blood Culture With BLU [[id number]]  (Normal) Collected:  07/06/17 1659    Specimen:  Blood from Hand, Left Updated:  07/07/17 0601     Blood Culture No growth at less than 24 hours    Blood Culture With BLU [[id number]]  (Normal) Collected:  07/06/17 1654    Specimen:  Blood from Arm, Right Updated:  07/07/17 0601     Blood Culture No growth at less than 24 hours    CBC Auto Differential [[id number]] Collected:  07/07/17 0616    Specimen:  Blood Updated:  07/07/17 0633    CBC & Differential [[id number]] Collected:  07/07/17 0616    Specimen:  Blood Updated:  07/07/17 0647    Narrative:       The following orders were created for panel order CBC & Differential.  Procedure                               Abnormality         Status                     ---------                               -----------         ------                     Scan Slide[[id number]]                                       In process                 CBC Auto Differential[[id number]]                            In process                   Please view results for these tests on the individual orders.    Scan Slide [[id number]] Collected:  07/07/17 0616    Specimen:  Blood Updated:  07/07/17 0647    Basic Metabolic Panel [[id number]]  (Abnormal) Collected:  07/07/17 0616    Specimen:  Blood Updated:  07/07/17 0653     Glucose 141 (H) mg/dL      BUN 29 (H) mg/dL      Creatinine 1.10 mg/dL      Sodium 139 mmol/L      Potassium 3.7 mmol/L      Chloride 101 mmol/L      CO2 26.0 mmol/L      Calcium 9.0 mg/dL      eGFR Non African Amer 67 mL/min/1.73      BUN/Creatinine Ratio 26.4 (H)     Anion Gap 15.7 mmol/L     Narrative:       Abnormal estimated GFR should be followed by more specific studies to confirm end stage chronic renal  disease. The equation used for calculation may not be accurate for patients less than 19 years old, greater than 70 years old, patients at extremes of weight, malnutrition, or with acute renal dysfunction.    Magnesium [[id number]]  (Normal) Collected:  07/07/17 0616    Specimen:  Blood Updated:  07/07/17 0653     Magnesium 1.7 mg/dL               Assessment/Plan     Active Problems:    Partial small bowel obstruction      I did have a detailed and extensive discussion with the patient and his wife this morning in the hospital.  Also discuss situation with the hospitalist service and the nursing staff.  I did review the patient's CT scan performed yesterday, this review was done with the radiologist.  This shows no evidence of bowel obstruction there is some slight thickening of the jejunum and there is obvious calcification of the wall of the gallbladder.  I don't think the patient has any surgical intervention needs at this time, I really wonder if he didn't suffer from a viral gastroenteritis which we have seen in other patients recently in the area.    I don't think the patient needs any surgical intervention at this time, per his history he is not been felt to be a surgical candidate in the past for his gallbladder.  I recommend a low-fat diet will slowly increase his diet today and see how he does.  Hopefully he'll be able to be discharged home with follow-up with his primary care physician.      Bonifacio Feliciano MD  07/07/17  7:01 AM

## 2017-07-07 NOTE — CONSULTS
Ariel Ugalde    1950    Primary Care Provider: HERNÁN Easley    Reason for Consultation: nausea, vomiting, diarrhea    Chief Complaint: nausea, vomiting, diarrhea    Subjective .     History of present illness:  I did see the patient in the hospital as a consultation after transfer from [facility] for evaluation and treatment of a history of persistent nausea, vomiting, and progressive diarrhea.  Apparently this has been present over the past 4 days and gotten progressively worse.  The patient does not know what makes this worse or better, he does give a history significant for gradually increasing upper quadrant abdominal discomfort.    His past history is very significant for severe coronary artery disease and congestive heart failure, he has previously been told that he has symptomatic cholelithiasis and was not an operative candidate.    Review of Systems:  Constitutional:  Negative for chills, fever, and unexpected weight change.  HENT: Negative for trouble swallowing and voice change.  Eyes:  Negative for visual disturbance.  Respiratory:  Negative for apnea, cough, chest tightness, shortness of breath, and wheezing.  Cardiovascular:  Negative for chest pain, palpitations, and leg swelling.  Gastrointestinal:  Negative for abdominal distention, there is a history significant for upper quadrant abdominal discomfort, there is a history significant for nausea vomiting and diarrhea  Musculoskeletal:  Negative for back pain, gait problem, and joint swelling.  Skin:  Negative for color change, rash, and wound  Neurological:  Negative for dizziness, syncope, speech difficulty, weakness, numbness, and headaches.  Hematological:  Negative for adenopathy.  Does not bruise/bleed easily.  Psychiatric/Behavioral:  Negative for confusion.  The patient is not nervous/anxious.        History:    Past Medical History:   Diagnosis Date   • Anxiety    • Arm fracture, left     status post surgical revision in 1996   • COPD  (chronic obstructive pulmonary disease)    • Coronary artery disease    • Depression    • Diabetes mellitus     type 2 (currently insulin dependent)   • Dyslipidemia    • Hypertension    • Lower extremity edema    • Obesity    • Osteoarthritis    • Paroxysmal atrial fibrillation 05/06/2014    status post transesophageal echocardiogram and cardioversion, maintaining sinus rhythm by EKG    • Peripheral neuropathy    • Phlebitis of left lower extremity    • Sleep apnea    • Tobacco use    • Valvular heart disease 01/2016    with mild to moderate aortic sclerosis and mild mitral regurgitation with normal ejection fraction by echocardiography,    • Vertigo        Past Surgical History:   Procedure Laterality Date   • CARDIAC CATHETERIZATION N/A 11/17/2016    Procedure: Left Heart Cath;  Surgeon: Fer Hsu MD;  Location: [facility] CATH INVASIVE LOCATION;  Service:    • SMALL INTESTINE SURGERY         Family History   Problem Relation Age of Onset   • Diabetes Mother    • Hypertension Mother    • Heart disease Mother      ischemic   • Cancer Mother    • Stroke Father    • Diabetes Father    • Hypertension Father    • Heart disease Father      ischemic   • Cancer Father    • Anxiety disorder Sister    • Alcohol abuse Brother    • Stroke Brother        Social History     Social History   • Marital status:      Spouse name: N/A   • Number of children: N/A   • Years of education: N/A     Occupational History   • Not on file.     Social History Main Topics   • Smoking status: Current Every Day Smoker     Packs/day: 1.00     Types: Cigarettes   • Smokeless tobacco: Never Used   • Alcohol use No   • Drug use: No   • Sexual activity: Defer     Other Topics Concern   • Not on file     Social History Narrative       Allergies:  Allergies   Allergen Reactions   • Prednisone Other (See Comments)     Chest pain       Medications:    Current Facility-Administered Medications:   •  ALPRAZolam (XANAX) tablet 1 mg, 1 mg, Oral,  Q12H, Roshan Shukla MD, 1 mg at 07/06/17 1257  •  amitriptyline (ELAVIL) tablet 25 mg, 25 mg, Oral, Nightly PRN, Roshan Shukla MD, 25 mg at 07/06/17 2124  •  aspirin EC tablet 81 mg, 81 mg, Oral, Daily, Roshan Shukla MD, 81 mg at 07/06/17 1258  •  atorvastatin (LIPITOR) tablet 40 mg, 40 mg, Oral, Nightly, Roshan Shukla MD, 40 mg at 07/06/17 2124  •  dextrose (D50W) solution 25 g, 25 g, Intravenous, Q15 Min PRN, Roman Coy MD  •  dextrose (GLUTOSE) oral gel 15 g, 15 g, Oral, Q1H PRN, Roman Coy MD  •  enoxaparin (LOVENOX) syringe 40 mg, 40 mg, Subcutaneous, Daily, Roman Coy MD, 40 mg at 07/06/17 0934  •  glucagon (human recombinant) (GLUCAGEN DIAGNOSTIC) injection 1 mg, 1 mg, Subcutaneous, Q15 Min PRN, Roman Coy MD  •  insulin aspart (novoLOG) injection 0-9 Units, 0-9 Units, Subcutaneous, 4x Daily AC & at Bedtime, Roman Coy MD  •  ipratropium-albuterol (DUO-NEB) nebulizer solution 3 mL, 3 mL, Nebulization, 4x Daily - RT, Roman Coy MD, 3 mL at 07/06/17 1915  •  levothyroxine (SYNTHROID, LEVOTHROID) tablet 50 mcg, 50 mcg, Oral, Q AM, Roshan Shukla MD, 50 mcg at 07/07/17 0640  •  morphine injection 2 mg, 2 mg, Intravenous, Q4H PRN, 2 mg at 07/06/17 0557 **AND** naloxone (NARCAN) injection 0.4 mg, 0.4 mg, Intravenous, Q5 Min PRN, Roman Coy MD  •  nicotine (NICODERM CQ) 21 MG/24HR patch 1 patch, 1 patch, Transdermal, Q24H, Roshan Shukla MD, 1 patch at 07/06/17 1354  •  ondansetron (ZOFRAN) injection 4 mg, 4 mg, Intravenous, Q6H PRN, Roman Coy MD, 4 mg at 07/06/17 0557  •  pantoprazole (PROTONIX) EC tablet 40 mg, 40 mg, Oral, Q AM, Roshan Shukla MD, 40 mg at 07/07/17 0640  •  PARoxetine (PAXIL) tablet 20 mg, 20 mg, Oral, QAM, Roshan Shukla MD, 20 mg at 07/07/17 0640  •  Pharmacy Meds to Bed Consult, , Does not apply, Daily, Dilia Talbot, [facility]  •  sodium chloride 0.9 % flush 1-10 mL, 1-10 mL,  Intravenous, PRN, Roman Coy MD  •  sodium chloride 0.9 % infusion, 75 mL/hr, Intravenous, Continuous, Roman Coy MD, Last Rate: 75 mL/hr at 07/07/17 0347, 75 mL/hr at 07/07/17 0347  •  tamsulosin (FLOMAX) 24 hr capsule 0.4 mg, 0.4 mg, Oral, Daily, Roshan Shukla MD, 0.4 mg at 07/06/17 1257    Objective     Vital Signs:   Temp:  [97.9 °F (36.6 °C)-99.1 °F (37.3 °C)] 97.9 °F (36.6 °C)  Heart Rate:  [68-95] 68  Resp:  [16-20] 20  BP: (125-129)/(66-71) 125/70    Physical Exam:   General Appearance:    Alert, cooperative, in no acute distress   Head:    Normocephalic, without obvious abnormality, atraumatic   Eyes:            Lids and lashes normal, conjunctivae and sclerae normal, no   icterus, no pallor, corneas clear, PERRLA   Throat:   No oral lesions, no thrush, oral mucosa moist   Neck:   No adenopathy, supple, trachea midline, no thyromegaly, no   carotid bruit, no JVD   Lungs:     Large airway breath sounds bilaterally     Heart:    Regular rhythm and normal rate, normal S1 and S2, no            murmur, no gallop, no rub, no click   Chest Wall:    No abnormalities observed   Abdomen:     Normal bowel sounds, no masses, no organomegaly, soft        non-tender, non-distended, no guarding, no rebound                Tenderness, there is slight tenderness to deep palpation in the midepigastric region no evidence of peritoneal signs    Extremities:   Moves all extremities well, no edema, no cyanosis, no             redness   Pulses:   Pulses In the radial arteries bilateral, poor pulses in the feet    Skin:   No bleeding, bruising or rash   Lymph nodes:   No palpable adenopathy   Neurologic:   Cranial nerves 2 - 12 grossly intact, sensation intact, DTR       present and equal bilaterally   Results Review:   I reviewed the patient's new clinical results.  I reviewed the patient's new imaging results and agree with the interpretation.  I reviewed the patient's other test results and agree with the  interpretation    Assessment/Plan     Nausea, vomiting, diarrhea    I did have a detailed and extensive discussion with the patient and his wife today at the bedside along with the hospitalist service.  I did review the patient's recent CT scan performed at [facility] and I doubt the presence of a small bowel obstruction.  I would like to repeat the CT scan today with oral contrast to better delineate the possibility of bowel obstruction.  I don't think the patient needs any operative intervention at this time.    I discussed the patients findings and my recommendations with patient, family, nursing staff and primary care team.    Bonifacio Feliciano MD  07/07/17  6:57 AM

## 2017-07-07 NOTE — PLAN OF CARE
Problem: Patient Care Overview (Adult)  Goal: Plan of Care Review  Outcome: Ongoing (interventions implemented as appropriate)    07/07/17 1514   Coping/Psychosocial Response Interventions   Plan Of Care Reviewed With patient   Patient Care Overview   Progress      07/07/17 1514   Coping/Psychosocial Response Interventions   Plan Of Care Reviewed With patient   Patient Care Overview   Progress improving   improving         Problem: NPPV/CPAP (Adult)  Goal: Signs and Symptoms of Listed Potential Problems Will be Absent or Manageable (NPPV/CPAP)  Outcome: Ongoing (interventions implemented as appropriate)    07/07/17 1514   NPPV/CPAP   Problems Assessed (NPPV/CPAP) dry mucous membranes;situational response;hypoxia/hypoxemia;skin breakdown;gastric distention leading to aspiration   Problems Present (NPPV/CPAP) none

## 2017-07-07 NOTE — PROGRESS NOTES
[facility]    [facility]    Name:  Ariel Ugalde   Age:  67 y.o.  Sex:  male  :  1950  MRN:  [id number]   Visit Number:  [id number]  Admission Date:  2017  Date Of Service:  17  Primary Care Physician:  HERNÁN Easley     LOS: 1 day :  Patient Care Team:  HERNÁN Mchugh as PCP - General (Nurse Practitioner)  Vandana Jones MD as PCP - Claims Attributed:    Chief Complaint:      Confusion    Subjective / Interval History:     Confusion today is improved.  Seroquel 25 mg po bid initiated.  Advance diet as tolerated.  Currently with no n/v or abdominal pain.  Patient appears relatively hydrated. Will restart his Bumex at half dose to see if he tolerates.  Patient currently with no soa, cp, n/v or abdominal pain.  He reports some loose watery stools that started today.    I have reviewed labs/imaging/records from this hospitalization, including ER staff and admitting/attending physicians H/P's and progress notes to establish a comprehensive understanding of this patient's clinical hospital course, as well as to establish a transition of care appropriately.    Vital Signs:    Temp:  [97.9 °F (36.6 °C)-99.1 °F (37.3 °C)] 97.9 °F (36.6 °C)  Heart Rate:  [68-95] 78  Resp:  [16-20] 16  BP: (125-131)/(66-84) 131/84    Intake and output:    Intake/Output       17 0700 - 17 0659 17 0700 - 17 0659    Intake (ml) 1000 120    Output (ml) -- --    Net (ml) 1000 120    Last Weight 229 lb 12.8 oz (104 kg) --          Physical Examination:    General Appearance:  Alert and cooperative, not in any acute distress.   Head:  Atraumatic and normocephalic, without obvious abnormality.   Eyes:      PERRLA, Extra-occular movements are within normal limits.   Lungs:   Chest shape is normal. Breath sounds heard bilaterally equally.  No crackles or wheezing.   Heart:  Normal S1 and S2, no murmur, no gallop, no rub.   Abdomen:   Normal bowel sounds,  Soft  non-tender, non-distended, no guarding, no rebound tenderness   Extremities: No edema                     Laboratory results:      Results from last 7 days  Lab Units 07/07/17  0616 07/06/17  0750   SODIUM mmol/L 139 142   POTASSIUM mmol/L 3.7 4.9   CHLORIDE mmol/L 101 100   CO2 mmol/L 26.0 26.0   BUN mg/dL 29* 32*   CREATININE mg/dL 1.10 1.40*   CALCIUM mg/dL 9.0 9.5   GLUCOSE mg/dL 141* 164*       Results from last 7 days  Lab Units 07/07/17  0616 07/06/17  0750   WBC 10*3/mm3 3.67* 4.60*   HEMOGLOBIN g/dL 11.1* 12.0*   HEMATOCRIT % 38.0* 41.3*   PLATELETS 10*3/mm3 138 154               I have reviewed the patient's laboratory results.    Radiology results:    Imaging Results (last 24 hours)     Procedure Component Value Units Date/Time    CT Abdomen Pelvis Without Contrast [[id number]] Collected:  07/06/17 1301     Updated:  07/06/17 1317    Narrative:       PROCEDURE: CT ABDOMEN PELVIS WO CONTRAST-     HISTORY: possible bowel obstruction     COMPARISON: None .     PROCEDURE: Axial images were obtained from the lung bases through the  pubic symphysis without intravenous contrast. Oral contrast was  administered.      FINDINGS:      ABDOMEN: Lack of IV contrast limits the evaluation of the mediastinum,  vasculature, and solid organs. There is probable bibasilar atelectasis  and scarring. The heart size is normal. There is an NG tube present with  its tip terminating in the stomach. The gallbladder wall is calcified  consistent with porcelain gallbladder. There is no obvious liver mass on  this unenhanced study. The spleen is normal. No adrenal masses are seen.   The pancreas has an unremarkable unenhanced appearance. The aorta is  normal in caliber. There is atherosclerosis. There is no significant  free fluid or adenopathy.  There is no nephrolithiasis. There is no  hydronephrosis. There is thickening in the jejunum which may be  infectious or inflammatory. There are no abnormally dilated loops of  bowel. There is  retained stool throughout the colon, which is not  decompressed.     PELVIS: The appendix is not identified. The urinary bladder is  unremarkable. There is no significant fluid or adenopathy. There are  degenerative changes of the spine.       Impression:       1. Thickening of the jejunum may be infectious or inflammatory. There  are no abnormally dilated loops of bowel or decompression of the colon.  2. 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  There is no obvious liver mass on this unenhanced study. Please  correlate clinically, continued follow-up is recommended.  3. Probable bibasilar atelectasis and scarring.  4. Atherosclerosis.                1314.9 mGy.cm.  25.9 mGy     This study was performed with techniques to keep radiation doses as low  as reasonably achievable (ALARA). Individualized dose reduction  techniques using automated exposure control or adjustment of mA and/or  kV according to the patient size were employed.         Images were reviewed, interpreted, and dictated by Dr. Wisdom.  Transcribed by Ilana Grier PA-C.     This report was finalized on 7/6/2017 1:15 PM by Uriel Wisdom DO.    XR Chest 1 View [[id number]] Collected:  07/06/17 1522     Updated:  07/06/17 1525    Narrative:       FINAL REPORT    CLINICAL HISTORY:  SOA    FINDINGS:  ONE VIEW CHEST.    The heart is normal in size. The mediastinum is unremarkable. Diffuse interstitial changes are probably chronic. The lungs are otherwise clear. There is no pneumothorax. The osseous structures are unremarkable.      Impression:       No acute process.    Continued follow-up is recommended.    Authenticated by Harish Nye MD on 07/06/2017 03:22:14 PM    XR Abdomen 2 View With Chest 1 View [[id number]] Updated:  07/07/17 0932          I have reviewed the patient's radiology reports.    Medication Review:     I have reviewed the patients active and prn medications.       Assessment/Plan:      1. Partial small  bowel obstruction, resolved  2. History of coronary artery disease with CABG and history of cardiac stents, last cardiac stents placed in November 2016.   3. History of valvular heart disease  4. History of congestive heart failure  5. History of hypertension  6. History of COPD/tobacco abuse, 2 packs per day  7. History of diabetes mellitus on insulin at home      Plan:       NGT removed.  Diet advance as tolerated. IV fluids titrated off. Started on low dose Levemir with SSI coverage.  Starting back on Bumex.  Diagnosed by Dr. Arellano for Lewy Body Dementia, with survival of 6-7 years. Seroquel 25 mg po BID started for patients acute confusion.   If continues to improve, possible DC home tomorrow.        Roshan Shukla MD  07/07/17  12:50 PM

## 2017-07-08 NOTE — PROGRESS NOTES
[facility] NEUROLOGY [place] PROGRESS NOTE  History of Present Illness     Date: 7/7/2017    Patient Identification  Ariel Ugalde is a 67 y.o. male.    Patient information was obtained from patient and wife  History/Exam limitations: none.    Original consultation requested by: Roshan Shukla M.D.      Chief Complaint   No chief complaint on file.      History of Present Illness   Patient is a delightful 67-year-old gentleman.  According to his wife patient has been 3-4 years progressive cognitive decline with fluctuating cognition.  His wife also reported that his REM sleep behavior disorder is quite violent and she have to move out of the bedroom.  And his wife report that almost on a nightly basis patient with falloff from bed.  She has to come to the bedroom to help him to get back in his own bed.     His wife also reports that patient has shuffling gait and tremor.  He has been told that he has Parkinson by his family physician.      PMH:   Past Medical History:   Diagnosis Date   • Anxiety    • Arm fracture, left     status post surgical revision in 1996   • COPD (chronic obstructive pulmonary disease)    • Coronary artery disease    • Depression    • Diabetes mellitus     type 2 (currently insulin dependent)   • Dyslipidemia    • Hypertension    • Lower extremity edema    • Obesity    • Osteoarthritis    • Paroxysmal atrial fibrillation 05/06/2014    status post transesophageal echocardiogram and cardioversion, maintaining sinus rhythm by EKG    • Peripheral neuropathy    • Phlebitis of left lower extremity    • Sleep apnea    • Tobacco use    • Valvular heart disease 01/2016    with mild to moderate aortic sclerosis and mild mitral regurgitation with normal ejection fraction by echocardiography,    • Vertigo        Past Surgical History:   Past Surgical History:   Procedure Laterality Date   • CARDIAC CATHETERIZATION N/A 11/17/2016    Procedure: Left Heart Cath;  Surgeon: Fer Hsu MD;   Location: [facility] INVASIVE LOCATION;  Service:    • SMALL INTESTINE SURGERY         Family Hisotry:   Family History   Problem Relation Age of Onset   • Diabetes Mother    • Hypertension Mother    • Heart disease Mother      ischemic   • Cancer Mother    • Stroke Father    • Diabetes Father    • Hypertension Father    • Heart disease Father      ischemic   • Cancer Father    • Anxiety disorder Sister    • Alcohol abuse Brother    • Stroke Brother        Social History:   Social History     Social History   • Marital status:      Spouse name: N/A   • Number of children: N/A   • Years of education: N/A     Occupational History   • Not on file.     Social History Main Topics   • Smoking status: Current Every Day Smoker     Packs/day: 1.00     Types: Cigarettes   • Smokeless tobacco: Never Used   • Alcohol use No   • Drug use: No   • Sexual activity: Defer     Other Topics Concern   • Not on file     Social History Narrative       Medications:   Current Facility-Administered Medications   Medication Dose Route Frequency Provider Last Rate Last Dose   • ALPRAZolam (XANAX) tablet 1 mg  1 mg Oral Q12H Roshan Shukla MD   1 mg at 07/06/17 1257   • amitriptyline (ELAVIL) tablet 25 mg  25 mg Oral Nightly PRN Roshan Shukla MD   25 mg at 07/07/17 2026   • aspirin EC tablet 81 mg  81 mg Oral Daily Roshan Shukla MD   81 mg at 07/07/17 0901   • atorvastatin (LIPITOR) tablet 40 mg  40 mg Oral Nightly Roshan Shukla MD   40 mg at 07/07/17 2026   • bumetanide (BUMEX) tablet 0.5 mg  0.5 mg Oral BID Roshan Shukla MD   0.5 mg at 07/07/17 1714   • dextrose (D50W) solution 25 g  25 g Intravenous Q15 Min PRN Roman Coy MD       • dextrose (GLUTOSE) oral gel 15 g  15 g Oral Q1H PRN Roman Coy MD       • enoxaparin (LOVENOX) syringe 40 mg  40 mg Subcutaneous Daily Roman Coy MD   40 mg at 07/07/17 0902   • glucagon (human recombinant) (GLUCAGEN DIAGNOSTIC) injection 1 mg  1 mg  Subcutaneous Q15 Min PRN Roman Coy MD       • HYDROcodone-acetaminophen (NORCO)  MG per tablet 1 tablet  1 tablet Oral Q4H PRN Roshan Shukla MD   1 tablet at 07/07/17 1714   • insulin aspart (novoLOG) injection 0-9 Units  0-9 Units Subcutaneous 4x Daily AC & at Bedtime Roman Coy MD   6 Units at 07/07/17 2026   • ipratropium-albuterol (DUO-NEB) nebulizer solution 3 mL  3 mL Nebulization 4x Daily - RT Roman Coy MD   3 mL at 07/07/17 1959   • levothyroxine (SYNTHROID, LEVOTHROID) tablet 50 mcg  50 mcg Oral Q AM Roshan Shukla MD   50 mcg at 07/07/17 0640   • nicotine (NICODERM CQ) 21 MG/24HR patch 1 patch  1 patch Transdermal Q24H Roshan Shukla MD   1 patch at 07/07/17 1536   • ondansetron (ZOFRAN) injection 4 mg  4 mg Intravenous Q6H PRN Roman Coy MD   4 mg at 07/06/17 0557   • pantoprazole (PROTONIX) EC tablet 40 mg  40 mg Oral Q AM Roshan Shukla MD   40 mg at 07/07/17 0640   • PARoxetine (PAXIL) tablet 20 mg  20 mg Oral QAM Roshan Shukla MD   20 mg at 07/07/17 0640   • Pharmacy Meds to Bed Consult   Does not apply Daily Dilia Talbot [facility]       • QUEtiapine (SEROquel) tablet 25 mg  25 mg Oral Q12H Roshan Shukla MD   25 mg at 07/07/17 2025   • sodium chloride 0.9 % flush 1-10 mL  1-10 mL Intravenous PRN Roman Coy MD       • tamsulosin (FLOMAX) 24 hr capsule 0.4 mg  0.4 mg Oral Daily Roshan Shukla MD   0.4 mg at 07/07/17 0901       Allergy:   Allergies   Allergen Reactions   • Prednisone Other (See Comments)     Chest pain       Review of Systems:  Review of Systems   Constitutional: Positive for fatigue. Negative for chills and fever.   HENT: Negative for congestion, ear pain, hearing loss, rhinorrhea and sore throat.    Eyes: Negative for pain, discharge and redness.   Respiratory: Negative for cough, shortness of breath, wheezing and stridor.    Cardiovascular: Negative for chest pain, palpitations and leg swelling.    Gastrointestinal: Negative for abdominal pain, constipation, nausea and vomiting.   Endocrine: Negative for cold intolerance, heat intolerance and polyphagia.   Genitourinary: Negative for dysuria, flank pain, frequency and urgency.   Musculoskeletal: Negative for joint swelling, myalgias, neck pain and neck stiffness.   Skin: Negative for pallor, rash and wound.   Allergic/Immunologic: Negative for environmental allergies.   Neurological: Positive for tremors. Negative for dizziness, seizures, syncope, facial asymmetry, speech difficulty, weakness, light-headedness, numbness and headaches.   Hematological: Negative for adenopathy.   Psychiatric/Behavioral: Positive for agitation, behavioral problems, confusion, decreased concentration, hallucinations and sleep disturbance. The patient is nervous/anxious.        Physical Exam     Vitals:    07/07/17 1508 07/07/17 1602 07/07/17 1959 07/07/17 2139   BP: 138/90   117/78   BP Location: Left arm   Left arm   Patient Position: Lying   Lying   Pulse: 82 88 85 81   Resp: 18 18 18 18   Temp: 98 °F (36.7 °C)   97.8 °F (36.6 °C)   TempSrc:    Oral   SpO2: 95% 90% 92% 93%   Weight:       Height:         GENERAL: Patient is pleasant, cooperative, appears to be stated age.  Body habitus is endomorphic.  SKIN AND EXTREMITIES:  No skin rashes or lesions are noted.  No cyanosis, clubbing or edema of the extremities.    HEAD:  Head is normocephalic and atraumatic.    NECK: Neck are non-tender without thyromegaly or adenopathy.  Carotic upstrokes are 1+/4.  No cranial or cervical bruits.  The neck is supple with a full range of motion.   ENT: palate elevate symmetrically, no evidence of high arch palate, tongue midline erythema in posterior pharynx, Mallampati Classification Class III   CARDIOVASCULAR:  Regular rate and rhythm with normal S1 and S2 without rub or gallop.  RESPIRATORY:  Clear to auscultation without wheezes or crackle   ABDOMEN:  Soft and non-tender, positive bowel  sound without hepatosplenomegaly  BACK:  Back is straight without midline defect.    PSYCH:  Higher cortical function/mental status:  The patient is alert.  She is oriented x3 to time, place and person.  Recent and the remote memory appear normal.  The patient has a good fund of knowledge.  There is no visual or auditory hallucination or suicidal or homicidal ideation.  SPEECH:There is no gross evidence of aphasia, dysarthria or agnosia.      CRANIAL NERVES:  Pupils are 4mm, equal round reactive to light, reacting briskly to 2mm without afferent pupillary defect.  Visual fields are intact to confrontation testing.  Fundoscopic examination reveals sharp disk margins with normal vasculature.  No papilledema, hemorrhages or exudates.  Extraocular movements are full and smooth with normal pursuits and saccades.  No nystagmus noted.  The face is symmetric. palate elevate symmetrically, Tongue midline, positive gag reflex. The remainder of the cranial nerves are intact and symmetrical.    MOTOR: Strength is 5/5 throughout with normal tone and bulk with the following exceptions, 4/5 intrinsic muscles of the hands and feet.  No involuntary movements noted.    Deep Tendon Reflexes: are 2/4 and symmetrical in the upper extremities, 2/4 and symmetrical at the knees and 1/4 and symmetrical at the Achilles tendon.  Plantar responses were down-going bilaterally.    SENSATION:  Intact to pinprick, light touch, vibration and proprioception.  Coordination:  The patient normally performs finger-nose-finger, heel-to-knee-to-shin and rapid alternating movements in symmetrical fashion.    COORDINATION AND GAIT:  The patient walks with a narrow-based gait.  Patient is able to heel-toe and tandem walk forward and backwards without difficulty.  Romberg and monopedal  Romberg are negative.    MUSCULOSKELETAL: Range of motion normal, no clubbing, cyanosis, or edema.  No joint swelling.            Records Reviewed: I have personally reviewed  his previous medical record.    IMPRESSION:  1.  REM sleep behavioral disorder  2.  Lewy body dementia      Treatments:  1.   patient and his wife about Lewy body dementia as follow  Lewy Body dementia is a disease that affects of 1.4 millions people in the [place].  It is widely not diagnosed or even misdiagnosed, however, because of the similarities that it has to other common neurological conditions such as Alzheimer's Disease and Parkinson's Disease.   Lewy Body Dementia includes a diagnosis of Parkinson's Dementia and dementia with Lewy bodies.  Early on, the two diseases differ significantly but the smae processes are occcuring in the brain.  After time, however, pateints diagnosed with LBD will develop similar cognitive, physical, sleep, and behavorial problems.   While the initial diagnosis of LBD is not always obvious, it is import to carefully observe and follow-up with patients who you may have suspicion of having LBD.  Early diagnosis of the condition allows for proactive treatment that may decrease mortality and morbidity of patients with LBD.  LBD is a multisystem disease and typically requires a comprehensive treatment approach. This approach involves a team of physicians from different specialties who collaborate to provide optimum treatment of each symptom without worsening other LBD symptoms. Many people with LBD enjoy significant improvement of their symptoms with a comprehensive approach to treatment, and some can have remarkably little change from year to year.    Common features of LBD include the following  Progressive dementia - deficits in attention and executive function are typical. Prominent memory impairment may not be evident in the early stages.  Fluctuating cognition with pronounced variations in attention and alertness.   Recurrent complex visual hallucinations, typically well formed and detailed.   Spontaneous features of parkinsonism.  These features may lead a  clinician to explore the diagnosis of LBD:  REM sleep behavior disorder (RBD), which can appear years before the onset of dementia and parkinsonism.   Severe sensitivity to neuroleptics occurs in up to 50% of LBD patients who take them.   Low dopamine transporter uptake in the brain's basal ganglia as seen on SPECT and PET imaging scans.   The following features may be present in LBD but are not necessary for the diagnosis:  Repeated falls and syncope (fainting).   Transient, unexplained loss of consciousness.   Autonomic dysfunction.   Hallucinations of other senses, like touch or hearing.   Visuospatial abnormalities.   Other psychiatric disturbances    Treatment for LBD:  Cognitive Symptoms  Medications called cholinesterase inhibitors are considered the standard treatment for cognitive symptoms in LBD. These medications were developed to treat Alzheimer’s disease. However, some researchers believe that people with LBD may be even more responsive to these types of medications than those with Alzheimer’s.  Movement Symptoms  Movement symptoms may be treated with a Parkinson’s medication called levodopa, but if the symptoms are mild, it may be best to not treat them in order to avoid potential medication side-effects.  REM Sleep Behavior Disorder (RBD)  RBD can be quite responsive to treatment, so your physician may recommend a medication like melatonin and/or clonazepam.  2.  Consultation on REM sleep Behavioral disorder and safety concern  3.  I discussed extensively today with regard to clinical trial opportunity to clinic patient and his wife is interested to participate in the REM sleep behavioral disorder clinical trial.    I have also discussed with the patient and his spouse about various clinical research site including [facility], [facility], and  clinical sites.    4.  Will need to schedule this patient for follow-up in my office for follow-up visit for participating clinical  research    This Document is signed by Roshan Arellano MD, FAAN, FAASM   July 7, 201710:16 PM

## 2017-07-08 NOTE — DISCHARGE SUMMARY
[facility] HOSPITALIST   DISCHARGE SUMMARY      Name:  Ariel Ugalde   Age:  67 y.o.  Sex:  male  :  1950  MRN:  [id number]   Visit Number:  [id number]  Primary Care Physician:  HERNÁN Easley  Date of Discharge:  2017  Admission Date:  2017      Discharge Diagnosis:     Patient Active Problem List   Diagnosis   • Coronary artery disease involving coronary bypass graft of native heart with angina pectoris   • Valvular heart disease   • Paroxysmal atrial fibrillation   • Essential hypertension   • Hyperlipidemia LDL goal <70   • Type 2 diabetes mellitus   • Peripheral neuropathy   • Obesity   • Phlebitis of left lower extremity   • Lower extremity edema   • Osteoarthritis   • Sleep apnea (Home CPAP)   • Depression   • COPD (chronic obstructive pulmonary disease)   • Tobacco use   • COPD exacerbation   • Anxiety (Chronic Xanax)   • CKD (chronic kidney disease)   • Chronic systolic congestive heart failure   • RUL Infiltrate, R/O CAP/Aspiration   • NSTEMI (non-ST elevated myocardial infarction)   • Chronic anemia   • Partial small bowel obstruction         Consults:     Consults     Date and Time Order Name Status Description    2017 1427 Inpatient Consult to Neurology      2017 0784 Inpatient Consult to General Surgery Completed            Hospital Course:  The patient was admitted on 2017, please see H&P for further details of HPI and ROS.    Patient is a 66 yo male with a pmhx of obesity, HTN, HLD, DMII with neuropathy, CAD with valvular disease, COPD, paroxysmal afib s/p cardioversion, LARA on nightly bipap admitted to the [place], [place] ED for two day symptoms of abdominal pain with N/V.  Patient was found to have SBO on CT abd/pelvis and was transferred to [facility] for further care. Patient was kept NPO, NGT was placed with low/intermittent wall suction, initiated IV fluids, with pain meds and antiemetics PRN.   Surgery was consulted and recommended no surgery.  Patients abdominal pain and n/v symptoms eventually resolved.  His NGT was removed.  His diet was advanced as tolerated. Patient has had multiple brown color stools.  Patient is ambulating without issues. 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  Surgery felt that his porcelain gallbladder is stable and not worth the risk for surgery secondary to his cardiac disease and multiple comorbidities. Patient also during his hospital stay had periods of confusion.  Family suspected parkinson like symptoms and underlying dementia for many years now. Neurology was consulted and diagnosed patient with Lewy Body Dementia with survival of approximately six years.  Patient was started on Seroquel with improvement of his delirium/confusion.  Dr. Arellano is to have continued outpatient follow up and treatment of his Lewy Body Dementia along with workup of his underlying LARA.  Patient is doing well today.  He is tolerating po without abdominal pain or issues.  He is ambulating the hallway.  He is stable for DC home with continued f/u with his PCP and specialists accordingly.     Vital Signs:    Temp:  [97.8 °F (36.6 °C)-98.1 °F (36.7 °C)] 98.1 °F (36.7 °C)  Heart Rate:  [80-88] 80  Resp:  [18] 18  BP: ()/(67-90) 138/76    Physical Examination:    General Appearance:    Alert and cooperative, not in any acute distress.   Head:    Atraumatic and normocephalic, without obvious abnormality.   Eyes:        PERRLA, Extra-occular movements are within normal limits.    Lungs:     Chest shape is normal. Breath sounds heard bilaterally equally.  No crackles or wheezing.    Heart:    Normal S1 and S2, no murmur, no gallop, no rub.   Abdomen:     Normal bowel sounds,  Soft non-tender, non-distended, no guarding, no rebound tenderness           Pertinent Lab Results:     Lab Results (last 24 hours)     Procedure Component Value Units Date/Time    Urinalysis With / Culture If Indicated  [[id number]]  (Abnormal) Collected:  07/07/17 1504    Specimen:  Urine from Urine, Clean Catch Updated:  07/07/17 1553     Color, UA Yellow     Appearance, UA Clear     pH, UA 5.5     Specific Gravity, UA 1.015     Glucose,  mg/dL (Trace) (A)     Ketones, UA Negative     Bilirubin, UA Negative     Blood, UA Trace (A)     Protein,  mg/dL (2+) (A)     Leuk Esterase, UA Negative     Nitrite, UA Negative     Urobilinogen, UA 2.0 E.U./dL (A)    Urinalysis, Microscopic Only [[id number]]  (Abnormal) Collected:  07/07/17 1504    Specimen:  Urine from Urine, Clean Catch Updated:  07/07/17 1553     RBC, UA 0-2 (A) /HPF      WBC, UA 0-2 (A) /HPF      Bacteria, UA Trace (A) /HPF      Squamous Epithelial Cells, UA 0-2 /HPF      Hyaline Casts, UA None Seen /LPF      Methodology Manual Light Microscopy    POC Glucose Fingerstick [[id number]]  (Abnormal) Collected:  07/07/17 1623    Specimen:  Blood Updated:  07/07/17 1635     Glucose 183 (H) mg/dL       Serial Number: [id number]    : [id number]       Blood Culture With BLU [[id number]]  (Normal) Collected:  07/06/17 1659    Specimen:  Blood from Hand, Left Updated:  07/07/17 1801     Blood Culture No growth at 24 hours    Blood Culture With BLU [[id number]]  (Normal) Collected:  07/06/17 1654    Specimen:  Blood from Arm, Right Updated:  07/07/17 1801     Blood Culture No growth at 24 hours    POC Glucose Fingerstick [[id number]]  (Abnormal) Collected:  07/07/17 2001    Specimen:  Blood Updated:  07/07/17 2010     Glucose 266 (H) mg/dL       Serial Number: [id number]    : [id number]       Basic Metabolic Panel [[id number]]  (Abnormal) Collected:  07/08/17 0556    Specimen:  Blood Updated:  07/08/17 0639     Glucose 156 (H) mg/dL      BUN 30 (H) mg/dL      Creatinine 1.10 mg/dL      Sodium 140 mmol/L      Potassium 4.1 mmol/L      Chloride 102 mmol/L      CO2 23.0 (L) mmol/L      Calcium 9.2 mg/dL      eGFR Non African Amer 67 mL/min/1.73      BUN/Creatinine Ratio 27.3  (H)     Anion Gap 19.1 mmol/L     Narrative:       Abnormal estimated GFR should be followed by more specific studies to confirm end stage chronic renal disease. The equation used for calculation may not be accurate for patients less than 19 years old, greater than 70 years old, patients at extremes of weight, malnutrition, or with acute renal dysfunction.    Magnesium [[id number]]  (Normal) Collected:  07/08/17 0556    Specimen:  Blood Updated:  07/08/17 0639     Magnesium 1.7 mg/dL     POC Glucose Fingerstick [[id number]]  (Abnormal) Collected:  07/08/17 0616    Specimen:  Blood Updated:  07/08/17 0730     Glucose 166 (H) mg/dL       Serial Number: [id number]    : [id number]       CBC & Differential [[id number]] Collected:  07/08/17 0558    Specimen:  Blood Updated:  07/08/17 0824    Narrative:       The following orders were created for panel order CBC & Differential.  Procedure                               Abnormality         Status                     ---------                               -----------         ------                     Manual Differential[[id number]]          Abnormal            Final result               Scan Slide[[id number]]                                       Final result               CBC Auto Differential[[id number]]        Abnormal            Final result                 Please view results for these tests on the individual orders.    CBC Auto Differential [[id number]]  (Abnormal) Collected:  07/08/17 0558    Specimen:  Blood Updated:  07/08/17 0824     WBC 3.07 (L) 10*3/mm3      RBC 5.22 10*6/mm3      Hemoglobin 11.7 (L) g/dL      Hematocrit 40.4 (L) %      MCV 77.4 (L) fL      MCH 22.4 (L) pg      MCHC 29.0 (L) g/dL      RDW 18.5 (H) %      RDW-SD 48.8 fl      MPV 10.7 fL      Platelets 138 10*3/mm3     Scan Slide [[id number]] Collected:  07/08/17 0558    Specimen:  Blood Updated:  07/08/17 0824     Scan Slide --      See Manual Differential Results       Manual Differential  [[id number]]  (Abnormal) Collected:  07/08/17 0558    Specimen:  Blood Updated:  07/08/17 0824     Neutrophil % 32.0 (L) %      Lymphocyte % 39.0 %      Monocyte % 11.0 %      Eosinophil % 1.0 %      Bands %  17.0 (H) %      Neutrophils Absolute 1.50 (L) 10*3/mm3      Lymphocytes Absolute 1.20 10*3/mm3      Monocytes Absolute 0.34 10*3/mm3      Eosinophils Absolute 0.03 10*3/mm3      Anisocytosis Slight/1+     Microcytes Slight/1+     WBC Morphology Normal     Platelet Estimate Adequate    POC Glucose Fingerstick [[id number]]  (Abnormal) Collected:  07/08/17 1058    Specimen:  Blood Updated:  07/08/17 1105     Glucose 167 (H) mg/dL       Serial Number: [id number]    : [id number]             Pertinent Radiology Results:  Imaging Results (most recent)     Procedure Component Value Units Date/Time    CT Abdomen Pelvis Without Contrast [[id number]] Collected:  07/06/17 1301     Updated:  07/06/17 1317    Narrative:       PROCEDURE: CT ABDOMEN PELVIS WO CONTRAST-     HISTORY: possible bowel obstruction     COMPARISON: None .     PROCEDURE: Axial images were obtained from the lung bases through the  pubic symphysis without intravenous contrast. Oral contrast was  administered.      FINDINGS:      ABDOMEN: Lack of IV contrast limits the evaluation of the mediastinum,  vasculature, and solid organs. There is probable bibasilar atelectasis  and scarring. The heart size is normal. There is an NG tube present with  its tip terminating in the stomach. The gallbladder wall is calcified  consistent with porcelain gallbladder. There is no obvious liver mass on  this unenhanced study. The spleen is normal. No adrenal masses are seen.   The pancreas has an unremarkable unenhanced appearance. The aorta is  normal in caliber. There is atherosclerosis. There is no significant  free fluid or adenopathy.  There is no nephrolithiasis. There is no  hydronephrosis. There is thickening in the jejunum which may be  infectious or  inflammatory. There are no abnormally dilated loops of  bowel. There is retained stool throughout the colon, which is not  decompressed.     PELVIS: The appendix is not identified. The urinary bladder is  unremarkable. There is no significant fluid or adenopathy. There are  degenerative changes of the spine.       Impression:       1. Thickening of the jejunum may be infectious or inflammatory. There  are no abnormally dilated loops of bowel or decompression of the colon.  2. 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  There is no obvious liver mass on this unenhanced study. Please  correlate clinically, continued follow-up is recommended.  3. Probable bibasilar atelectasis and scarring.  4. Atherosclerosis.                1314.9 mGy.cm.  25.9 mGy     This study was performed with techniques to keep radiation doses as low  as reasonably achievable (ALARA). Individualized dose reduction  techniques using automated exposure control or adjustment of mA and/or  kV according to the patient size were employed.         Images were reviewed, interpreted, and dictated by Dr. Wisdom.  Transcribed by Ilana Grier PA-C.     This report was finalized on 7/6/2017 1:15 PM by Uriel Wisdom DO.    XR Chest 1 View [[id number]] Collected:  07/06/17 1522     Updated:  07/06/17 1525    Narrative:       FINAL REPORT    CLINICAL HISTORY:  SOA    FINDINGS:  ONE VIEW CHEST.    The heart is normal in size. The mediastinum is unremarkable. Diffuse interstitial changes are probably chronic. The lungs are otherwise clear. There is no pneumothorax. The osseous structures are unremarkable.      Impression:       No acute process.    Continued follow-up is recommended.    Authenticated by Harish Nye MD on 07/06/2017 03:22:14 PM    XR Abdomen 2 View With Chest 1 View [[id number]] Collected:  07/07/17 1407     Updated:  07/07/17 1416    Narrative:       PROCEDURE: XR ABDOMEN 2 VW W CHEST 1 VW-     HISTORY: partial SBO      COMPARISON: None.     FINDINGS: Chest: A single view of the chest demonstrates the heart to be  mildly enlarged. The patient is status post median sternotomy. There are  vascular calcifications. There is bibasilar atelectasis.         Abdomen: Flat and upright views of the abdomen demonstrate multiple  dilated small bowel loops measuring up to 5 cm, likely due to partial  small bowel obstruction. There is no free air. There does appear to be  oral contrast in the rectum.       Impression:       Multiple dilated small bowel loops measuring up to 5 cm are  likely due to partial small bowel obstruction. Continued follow-up is  recommended.           Images were reviewed, interpreted, and dictated by Dr. Wisodm.  Transcribed by Ilana Grier PA-C.     This report was finalized on 7/7/2017 2:14 PM by Uriel Wisdom DO.    XR Chest 1 View [[id number]] Collected:  07/08/17 0931     Updated:  07/08/17 0936    Narrative:       PROCEDURE: XR CHEST 1 VW-     HISTORY: copd     COMPARISON: None.     FINDINGS: The heart is enlarged. Median sternotomy clips are again  noted. Likely chronic interstitial changes are again noted. There is no  pneumothorax.  There are no acute osseous abnormalities. There are  overlying leads.       Impression:       No acute cardiopulmonary process.     Continued followup is recommended.     This report was finalized on 7/8/2017 9:34 AM by Uriel Wisdom DO.            Code Status:  FULL     Discharge Disposition:        Discharge Medication:     Ariel Ugalde   Home Medication Instructions KENNY:[id number]    Printed on:07/08/17 7488   Medication Information                      amitriptyline (ELAVIL) 25 MG tablet  Take 25 mg by mouth At Night As Needed for Sleep.             aspirin EC 81 MG EC tablet  Take 1 tablet by mouth Daily for 360 days.             bumetanide (BUMEX) 1 MG tablet  Take 1 tablet by mouth 2 (Two) Times a Day.             clopidogrel (PLAVIX) 75 MG tablet  Take 1 tablet by  mouth Daily for 360 days.             cyclobenzaprine (FLEXERIL) 10 MG tablet  Take 10 mg by mouth At Night As Needed for Muscle Spasms.             gabapentin (NEURONTIN) 800 MG tablet  Take 800 mg by mouth 4 (Four) Times a Day.             HYDROcodone-acetaminophen (NORCO)  MG per tablet  Take 1 tablet by mouth Every 4 (Four) Hours As Needed for moderate pain (4-6).             insulin aspart (novoLOG) 100 UNIT/ML injection  Inject 10 Units under the skin 3 (Three) Times a Day Before Meals.             insulin glargine (LANTUS) 100 UNIT/ML injection  Inject 60 Units under the skin 2 (Two) Times a Day.             isosorbide mononitrate (IMDUR) 120 MG 24 hr tablet  Take 1 tablet by mouth Daily for 360 days.             levothyroxine (SYNTHROID, LEVOTHROID) 50 MCG tablet  Take 50 mcg by mouth Daily.             melatonin 5 MG tablet tablet  Take 1 tablet by mouth At Night As Needed (Sleep).             metoprolol tartrate (LOPRESSOR) 50 MG tablet  Take 50 mg by mouth 2 (Two) Times a Day.             NIACIN ER PO  Take 500 mg by mouth Daily.             nitroglycerin (NITROSTAT) 0.4 MG SL tablet  Place 0.4 mg under the tongue Every 5 (Five) Minutes As Needed for chest pain. Take no more than 3 doses in 15 minutes.             ondansetron ODT (ZOFRAN-ODT) 4 MG disintegrating tablet  Take 4 mg by mouth Every 8 (Eight) Hours As Needed for Nausea or Vomiting.             pantoprazole (PROTONIX) 40 MG EC tablet  Take 40 mg by mouth Daily.             PARoxetine (PAXIL) 20 MG tablet  Take 20 mg by mouth Every Morning.             potassium chloride (K-DUR,KLOR-CON) 20 MEQ CR tablet  Take 20 mEq by mouth 2 (Two) Times a Day.             QUEtiapine (SEROquel) 25 MG tablet  Take 1 tablet by mouth Every 12 (Twelve) Hours.             ROSUVASTATIN CALCIUM PO  Take 20 mg by mouth Daily.             tamsulosin (FLOMAX) 0.4 MG capsule 24 hr capsule  Take 1 capsule by mouth Daily. Take 1/2 hour following the same meal every  day                 Discharge Diet:     Diet Instructions     Diet: Consistent Carbohydrate, Cardiac, Soft Texture; Thin Liquids, No Restrictions; Ground       Discharge Diet:   Consistent Carbohydrate  Cardiac  Soft Texture      Fluid Consistency:  Thin Liquids, No Restrictions   Soft Options:  Ground                 Activity at Discharge:     Activity Instructions     Activity as Tolerated                     Follow-up Appointments:    Follow-up Information     Follow up with HERNÁN Easley .    Specialty:  Nurse Practitioner    Why:  PCP within one week for CBC and BMP check.  Continued f/u of chronic health issues.    Contact information:    [address]  [place] [id number] [phone]          Follow up with Vandana Jones MD .    Specialty:  Hospice and Palliative Medicine    Why:  PCP within one week for CBC and BMP check.  Continued f/u of chronic health issues.    Contact information:    [address] [facility] [address] [phone]              Test Results Pending at Discharge:     Order Current Status    Blood Culture With BLU Preliminary result    Blood Culture With BLU Preliminary result             Roshan Shukla MD  07/08/17  2:07 PM    Time: Discharge 38 min

## 2017-07-08 NOTE — PLAN OF CARE
Problem: Patient Care Overview (Adult)  Goal: Plan of Care Review  Outcome: Ongoing (interventions implemented as appropriate)    07/08/17 0409   Coping/Psychosocial Response Interventions   Plan Of Care Reviewed With patient;spouse   Patient Care Overview   Progress improving   Outcome Evaluation   Outcome Summary/Follow up Plan NO NAUSEA, VOMITING, OR DIARRHEA THIS SHIFT. LESS CONFUSION THAN PREVIOUS NIGHT       Goal: Adult Individualization and Mutuality  Outcome: Ongoing (interventions implemented as appropriate)  Goal: Discharge Needs Assessment  Outcome: Ongoing (interventions implemented as appropriate)    Problem: Bowel Obstruction (Adult)  Goal: Signs and Symptoms of Listed Potential Problems Will be Absent or Manageable (Bowel Obstruction)  Outcome: Ongoing (interventions implemented as appropriate)    Problem: Fall Risk (Adult)  Goal: Identify Related Risk Factors and Signs and Symptoms  Outcome: Ongoing (interventions implemented as appropriate)  Goal: Absence of Falls  Outcome: Ongoing (interventions implemented as appropriate)    Problem: Pain, Chronic (Adult)  Goal: Acceptable Pain Control/Comfort Level  Outcome: Ongoing (interventions implemented as appropriate)    Problem: NPPV/CPAP (Adult)  Goal: Signs and Symptoms of Listed Potential Problems Will be Absent or Manageable (NPPV/CPAP)  Outcome: Ongoing (interventions implemented as appropriate)

## 2017-07-08 NOTE — PROGRESS NOTES
LOS: 2 days   Patient Care Team:  HERNÁN Mchugh as PCP - General (Nurse Practitioner)  Vandana Jones MD as PCP - Claims Attributed        Subjective  patient doing well, no evidence of bowel obstruction on CT scan.  Patient understood review with Dr. Feliciano yesterday that he would not be undergoing surgery for his gallbladder.  Patient up for cardiac risk.  No significant abdominal pain today.    Interval History:     Patient Complaints: none    History taken from: patient    Vital Signs  Temp:  [97.8 °F (36.6 °C)-98.1 °F (36.7 °C)] 98.1 °F (36.7 °C)  Heart Rate:  [78-88] 80  Resp:  [16-18] 18  BP: ()/(67-90) 138/76    Physical Exam:     General Appearance:    Alert, cooperative, in no acute distress   Head:    Normocephalic, without obvious abnormality, atraumatic   Lungs:     Clear to auscultation,respirations regular, even and                  unlabored    Heart:    Regular rhythm and normal rate, normal S1 and S2, no            murmur, no gallop, no rub, no click   Abdomen:     Normal bowel sounds, no masses, no organomegaly, soft        non-tender, non-distended, no guarding, no rebound                tenderness   Extremities:   Moves all extremities well, no edema, no cyanosis, no             redness   Pulses:   Pulses palpable and equal bilaterally   Skin:   No bleeding, bruising or rash        Results Review:       Lab Results (last 24 hours)     Procedure Component Value Units Date/Time    POC Glucose Fingerstick [[id number]]  (Abnormal) Collected:  07/07/17 1116    Specimen:  Blood Updated:  07/07/17 1306     Glucose 202 (H) mg/dL       Serial Number: [id number]    : [id number]       Urinalysis With / Culture If Indicated [[id number]]  (Abnormal) Collected:  07/07/17 1504    Specimen:  Urine from Urine, Clean Catch Updated:  07/07/17 1553     Color, UA Yellow     Appearance, UA Clear     pH, UA 5.5     Specific Gravity, UA 1.015     Glucose,  mg/dL (Trace) (A)     Ketones, UA  Negative     Bilirubin, UA Negative     Blood, UA Trace (A)     Protein,  mg/dL (2+) (A)     Leuk Esterase, UA Negative     Nitrite, UA Negative     Urobilinogen, UA 2.0 E.U./dL (A)    Urinalysis, Microscopic Only [[id number]]  (Abnormal) Collected:  07/07/17 1504    Specimen:  Urine from Urine, Clean Catch Updated:  07/07/17 1553     RBC, UA 0-2 (A) /HPF      WBC, UA 0-2 (A) /HPF      Bacteria, UA Trace (A) /HPF      Squamous Epithelial Cells, UA 0-2 /HPF      Hyaline Casts, UA None Seen /LPF      Methodology Manual Light Microscopy    POC Glucose Fingerstick [[id number]]  (Abnormal) Collected:  07/07/17 1623    Specimen:  Blood Updated:  07/07/17 1635     Glucose 183 (H) mg/dL       Serial Number: [id number]    : [id number]       Blood Culture With BLU [[id number]]  (Normal) Collected:  07/06/17 1659    Specimen:  Blood from Hand, Left Updated:  07/07/17 1801     Blood Culture No growth at 24 hours    Blood Culture With BLU [[id number]]  (Normal) Collected:  07/06/17 1654    Specimen:  Blood from Arm, Right Updated:  07/07/17 1801     Blood Culture No growth at 24 hours    POC Glucose Fingerstick [[id number]]  (Abnormal) Collected:  07/07/17 2001    Specimen:  Blood Updated:  07/07/17 2010     Glucose 266 (H) mg/dL       Serial Number: [id number]    : [id number]       Basic Metabolic Panel [[id number]]  (Abnormal) Collected:  07/08/17 0556    Specimen:  Blood Updated:  07/08/17 0639     Glucose 156 (H) mg/dL      BUN 30 (H) mg/dL      Creatinine 1.10 mg/dL      Sodium 140 mmol/L      Potassium 4.1 mmol/L      Chloride 102 mmol/L      CO2 23.0 (L) mmol/L      Calcium 9.2 mg/dL      eGFR Non African Amer 67 mL/min/1.73      BUN/Creatinine Ratio 27.3 (H)     Anion Gap 19.1 mmol/L     Narrative:       Abnormal estimated GFR should be followed by more specific studies to confirm end stage chronic renal disease. The equation used for calculation may not be accurate for patients less than 19 years old, greater  than 70 years old, patients at extremes of weight, malnutrition, or with acute renal dysfunction.    Magnesium [[id number]]  (Normal) Collected:  07/08/17 0556    Specimen:  Blood Updated:  07/08/17 0639     Magnesium 1.7 mg/dL     POC Glucose Fingerstick [[id number]]  (Abnormal) Collected:  07/08/17 0616    Specimen:  Blood Updated:  07/08/17 0730     Glucose 166 (H) mg/dL       Serial Number: [id number]    : [id number]       CBC & Differential [[id number]] Collected:  07/08/17 0558    Specimen:  Blood Updated:  07/08/17 0824    Narrative:       The following orders were created for panel order CBC & Differential.  Procedure                               Abnormality         Status                     ---------                               -----------         ------                     Manual Differential[[id number]]          Abnormal            Final result               Scan Slide[[id number]]                                       Final result               CBC Auto Differential[[id number]]        Abnormal            Final result                 Please view results for these tests on the individual orders.    CBC Auto Differential [[id number]]  (Abnormal) Collected:  07/08/17 0558    Specimen:  Blood Updated:  07/08/17 0824     WBC 3.07 (L) 10*3/mm3      RBC 5.22 10*6/mm3      Hemoglobin 11.7 (L) g/dL      Hematocrit 40.4 (L) %      MCV 77.4 (L) fL      MCH 22.4 (L) pg      MCHC 29.0 (L) g/dL      RDW 18.5 (H) %      RDW-SD 48.8 fl      MPV 10.7 fL      Platelets 138 10*3/mm3     Scan Slide [[id number]] Collected:  07/08/17 0558    Specimen:  Blood Updated:  07/08/17 0824     Scan Slide --      See Manual Differential Results       Manual Differential [[id number]]  (Abnormal) Collected:  07/08/17 0558    Specimen:  Blood Updated:  07/08/17 0824     Neutrophil % 32.0 (L) %      Lymphocyte % 39.0 %      Monocyte % 11.0 %      Eosinophil % 1.0 %      Bands %  17.0 (H) %      Neutrophils Absolute 1.50 (L) 10*3/mm3       Lymphocytes Absolute 1.20 10*3/mm3      Monocytes Absolute 0.34 10*3/mm3      Eosinophils Absolute 0.03 10*3/mm3      Anisocytosis Slight/1+     Microcytes Slight/1+     WBC Morphology Normal     Platelet Estimate Adequate              Assessment/Plan     Active Problems:    Partial small bowel obstruction      Was all partial small bowel obstruction, questionable gastroenteritis.  No need for surgical intervention.  Porcelain gallbladder appears stable.  Doing well with a low-fat diet.  Okay to discharge home with follow-up with primary care physician.      Francis Segal MD  07/08/17  9:43 AM

## 2017-07-14 PROBLEM — K56.600 PARTIAL SMALL BOWEL OBSTRUCTION (HCC): Status: RESOLVED | Noted: 2017-01-01 | Resolved: 2017-01-01

## 2017-07-14 PROBLEM — K56.609 SBO (SMALL BOWEL OBSTRUCTION) (HCC): Status: ACTIVE | Noted: 2017-01-01

## 2017-07-14 PROBLEM — K56.600 PARTIAL SMALL BOWEL OBSTRUCTION (HCC): Status: ACTIVE | Noted: 2017-01-01

## 2017-08-02 NOTE — PROGRESS NOTES
Encounter Date:08/02/2017      Patient ID: Ariel Ugalde is a 67 y.o. male.        Subjective:     Chief Complaint: Follow-up (CAD)   diastolic heart failure  History of Present Illness He presents to the heart and valve center today as an add-on after a long hiatus.  Patient was seen in the heart and valve Center last January 2016.  Patient has had frequent admissions over the last 6 months.  Patient had MI and was admitted November 2016 underwent a left heart cath per Dr. Hsu where he received 2 stents. Patient had a small bowel obstruction July 2017 requiring admission and then presented to [facility] ED on 7/26/2017 with syncope.  Hospitalization mid July patient was diagnosed with MSSA bacteremia and is currently receiving IV antibiotics 3 times daily.  Echo July 16, 2017: Shows EF of 40%, moderate to severe AS, mild MR, RVSP 42.6 mmHg  During recent ED visit on 7/26/2017 patient was found to be in atrial fibrillation due to hypocalcemia and hypomagnesemia.  Those electrolytes were replaced.  He  reports fatigue, weight gain of 13 pounds in one week, dyspnea on exertion, pedal edema, intermittent dizziness and lightheadedness.  He denies chest pain, chest pressure, palpitations, tachycardia, presyncope, orthopnea, PND, abdominal fullness, early satiety, claudication.  Patient Active Problem List   Diagnosis   • SBO (small bowel obstruction)   • Aortic sclerosis   • CAD (coronary artery disease)   • Chronic low back pain   • Chronic narcotic use   • Chronic venous insufficiency   • Chronically on benzodiazepine therapy   • CKD (chronic kidney disease) stage 3, GFR 30-59 ml/min   • COPD (chronic obstructive pulmonary disease)   • DM2 (diabetes mellitus, type 2)   • CALVIN (generalized anxiety disorder)   • HLD (hyperlipidemia)   • Hypertension   • LARA (obstructive sleep apnea)   • Osteoarthritis of lumbar spine   • Oxygen dependent   • Porcelain gallbladder   • Tobacco dependence   • Partial small  bowel obstruction       Past Surgical History:   Procedure Laterality Date   • CARDIAC CATHETERIZATION N/A 11/17/2016    Procedure: Left Heart Cath;  Surgeon: Fer Hsu MD;  Location: [facility] CATH INVASIVE LOCATION;  Service:    • CORONARY ANGIOPLASTY WITH STENT PLACEMENT  2012   • CORONARY ARTERY BYPASS GRAFT  2009   • ORIF FOREARM FRACTURE Left 1996   • SMALL INTESTINE SURGERY  1950   • UPPER GASTROINTESTINAL ENDOSCOPY  2014       Allergies   Allergen Reactions   • Prednisone Other (See Comments)     Chest pain         Current Outpatient Prescriptions:   •  amitriptyline (ELAVIL) 25 MG tablet, Take 25 mg by mouth At Night As Needed for Sleep., Disp: , Rfl:   •  aspirin EC 81 MG EC tablet, Take 1 tablet by mouth Daily for 360 days., Disp: 90 tablet, Rfl: 3  •  bumetanide (BUMEX) 1 MG tablet, Take 1 tablet by mouth 2 (Two) Times a Day., Disp: 180 tablet, Rfl: 1  •  ceFAZolin in dextrose (ANCEF) 2-4 GM/100ML-% solution IVPB, Infuse 100 mL into a venous catheter Every 8 (Eight) Hours. Indications: Bacteria in the Blood, Disp: 100 mL, Rfl:   •  clonazePAM (KlonoPIN) 0.5 MG tablet, Take 0.5 mg by mouth Every Night., Disp: , Rfl:   •  clopidogrel (PLAVIX) 75 MG tablet, Take 1 tablet by mouth Daily for 360 days., Disp: 90 tablet, Rfl: 3  •  cyclobenzaprine (FLEXERIL) 10 MG tablet, Take 10 mg by mouth At Night As Needed for Muscle Spasms., Disp: , Rfl:   •  gabapentin (NEURONTIN) 800 MG tablet, Take 800 mg by mouth 4 (Four) Times a Day., Disp: , Rfl:   •  HYDROcodone-acetaminophen (NORCO)  MG per tablet, Take 1 tablet by mouth Every 4 (Four) Hours As Needed for moderate pain (4-6)., Disp: , Rfl:   •  insulin aspart (novoLOG) 100 UNIT/ML injection, Inject 10 Units under the skin 3 (Three) Times a Day Before Meals., Disp: , Rfl:   •  insulin glargine (LANTUS) 100 UNIT/ML injection, Inject 20 Units under the skin 2 (Two) Times a Day. Advance dose as diet advances, Disp: , Rfl: 12  •  isosorbide mononitrate (IMDUR)  120 MG 24 hr tablet, Take 1 tablet by mouth Daily for 360 days., Disp: 90 tablet, Rfl: 3  •  levothyroxine (SYNTHROID, LEVOTHROID) 50 MCG tablet, Take 50 mcg by mouth Daily., Disp: , Rfl:   •  melatonin 5 MG tablet tablet, Take 1 tablet by mouth At Night As Needed (Sleep)., Disp: 90 tablet, Rfl: 0  •  NIACIN ER PO, Take 500 mg by mouth Daily., Disp: , Rfl:   •  nitroglycerin (NITROSTAT) 0.4 MG SL tablet, Place 0.4 mg under the tongue Every 5 (Five) Minutes As Needed for chest pain. Take no more than 3 doses in 15 minutes., Disp: , Rfl:   •  ondansetron ODT (ZOFRAN-ODT) 4 MG disintegrating tablet, Take 4 mg by mouth Every 8 (Eight) Hours As Needed for Nausea or Vomiting., Disp: , Rfl:   •  pantoprazole (PROTONIX) 40 MG EC tablet, Take 40 mg by mouth Daily., Disp: , Rfl:   •  PARoxetine (PAXIL) 20 MG tablet, Take 20 mg by mouth Every Morning., Disp: , Rfl:   •  potassium chloride (K-DUR,KLOR-CON) 20 MEQ CR tablet, Take 20 mEq by mouth 2 (Two) Times a Day., Disp: , Rfl:   •  QUEtiapine (SEROquel) 25 MG tablet, Take 1 tablet by mouth Every 12 (Twelve) Hours., Disp: 60 tablet, Rfl: 0  •  ROSUVASTATIN CALCIUM PO, Take 20 mg by mouth Daily., Disp: , Rfl:   •  tamsulosin (FLOMAX) 0.4 MG capsule 24 hr capsule, Take 1 capsule by mouth Daily. Take 1/2 hour following the same meal every day, Disp: , Rfl:   •  carvedilol (COREG) 12.5 MG tablet, Take 1 tablet by mouth 2 (Two) Times a Day., Disp: 60 tablet, Rfl: 11  •  ferrous sulfate 325 (65 FE) MG tablet, Take 1 tablet by mouth Daily With Breakfast., Disp: 30 tablet, Rfl: 5  •  vitamin C (ASCORBIC ACID) 500 MG tablet, Take 1 tablet by mouth Daily., Disp: 30 tablet, Rfl: 5    The following portions of the chart were reviewed and updated as appropriate: Allergies, current medications, past family history, social history, past medical history.     Review of Systems   Constitution: Positive for fever, weakness, malaise/fatigue and weight gain (13 lbs in 1 week). Negative for chills,  "decreased appetite, diaphoresis, night sweats and weight loss.   HENT: Negative for congestion, headaches, hearing loss, hoarse voice and nosebleeds.    Eyes: Positive for blurred vision. Negative for visual disturbance and visual halos.   Cardiovascular: Positive for claudication, dyspnea on exertion, leg swelling, near-syncope, orthopnea and syncope. Negative for chest pain, cyanosis, irregular heartbeat, palpitations and paroxysmal nocturnal dyspnea.   Respiratory: Positive for cough, shortness of breath, sleep disturbances due to breathing, snoring, sputum production and wheezing. Negative for hemoptysis.    Endocrine: Positive for polydipsia and polyphagia.   Hematologic/Lymphatic: Negative for bleeding problem. Bruises/bleeds easily.   Skin: Negative for dry skin, itching and rash.   Musculoskeletal: Positive for falls, joint swelling and muscle cramps. Negative for arthritis, joint pain and myalgias.   Gastrointestinal: Positive for abdominal pain, diarrhea, dysphagia, heartburn, nausea and vomiting. Negative for bloating, constipation, flatus, hematemesis, hematochezia and melena.   Genitourinary: Negative for dysuria, frequency, hematuria, nocturia and urgency.   Neurological: Positive for excessive daytime sleepiness, dizziness and light-headedness. Negative for loss of balance.   Psychiatric/Behavioral: Positive for altered mental status (confusion), depression and memory loss. The patient has insomnia and is nervous/anxious.            Objective:     Vitals:    08/02/17 1333 08/02/17 1335   BP: 144/81 127/85   BP Location: Left arm Left arm   Patient Position: Sitting Standing   Cuff Size: Adult    Pulse: 68 73   Resp: 22    Temp: 97.9 °F (36.6 °C)    TempSrc: Temporal Artery     SpO2: 96%    Weight: 247 lb (112 kg)    Height: 71\" (180.3 cm)          Physical Exam   Constitutional: He is oriented to person, place, and time. He appears well-developed and well-nourished. He is active and cooperative. No " distress.   HENT:   Head: Normocephalic and atraumatic.   Mouth/Throat: Oropharynx is clear and moist.   Eyes: Conjunctivae and EOM are normal. Pupils are equal, round, and reactive to light.   Neck: Normal range of motion. Neck supple. No JVD present. No tracheal deviation present. No thyromegaly present.   Cardiovascular: Normal rate, regular rhythm, normal heart sounds and intact distal pulses.    Pulmonary/Chest: Effort normal and breath sounds normal.   Abdominal: Soft. Bowel sounds are normal. He exhibits no distension. There is no tenderness.   Musculoskeletal: Normal range of motion.   Neurological: He is alert and oriented to person, place, and time.   Skin: Skin is warm, dry and intact.   Psychiatric: He has a normal mood and affect. His behavior is normal.   Nursing note and vitals reviewed.      Lab and Diagnostic Review:    EKG today: Bifascicular block at 68 bpm  Labs 7/31/2017: Magnesium 1.3, WBC 5.3, RBC 4.07, hemoglobin 9.4, hematocrit 31.2, platelets 198, glucose 294, sodium 139, potassium 3.9, calcium 8.4, BUN 18, creatinine 1.15, alkaline phosphatase 65, ALT 3, AST 10, total bilirubin 0.4      Assessment and Plan:         1. PAF (paroxysmal atrial fibrillation)  There has been some confusion regarding patient's metoprolol and carvedilol and he has not been taking either beta blocker.  Patient to resume carvedilol 12.5 mg twice a day use previously directed   on aspirin only due to fall risk  - ECG 12 Lead; Future    2. Acute on Chronic systolic congestive heart failure  Patient to begin Bumex 2 mg every morning and 1 mg every afternoon for the next 2 days  Heart failure education today including signs and symptoms, the role of the heart failure center, daily weights, low sodium diet (less than 1500 mg per day), and daily physical activity. Reviewed HF Zones with patient and family.  Patient to continue current medications as previously ordered.   3. Iron deficiency anemia  Patient to begin  ferrous sulfate 325 mg daily with vitamin C 500 mg daily    4. Chronic obstructive pulmonary disease, unspecified COPD type  Without exacerbation  Patient continues to smoke and is not ready to discuss smoking cessation. Did try to discuss smoking cessation but patient refused.     It has been a pleasure to participate in the care of this patient.  Patient was instructed to call the [facility] with any questions, concerns, or worsening symptoms.    * Please note that portions of this note were completed with a voice recognition program. Efforts were made to edit the dictation but occasionally words are transcribed.

## 2017-08-09 NOTE — PROGRESS NOTES
Encounter Date:08/09/2017      Patient ID: Ariel Ugalde is a 67 y.o. male.        Subjective:     Chief Complaint: Follow-up   [facility]  History of Present Illness patient presents today for ongoing evaluation of his chronic systolic heart failure and CAD. Patient notes fatigue, dyspnea at rest and with exertion that has been present for the last few days. He also notes a deep cough that is sometimes productive. He also reports abdominal fullness that has worsened over the last 3 days. He denies  chest pain, chest pressure, palpitations, tachycardia, presyncope, syncope, orthopnea, PND, early satiety, claudication, or edema. He notes compliance with his medications.  He currently has a picc line and is undergoing IV antibiotics 3 times a day.     Patient Active Problem List   Diagnosis   • SBO (small bowel obstruction)   • Aortic sclerosis   • CAD (coronary artery disease)   • Chronic low back pain   • Chronic narcotic use   • Chronic venous insufficiency   • Chronically on benzodiazepine therapy   • CKD (chronic kidney disease) stage 3, GFR 30-59 ml/min   • COPD (chronic obstructive pulmonary disease)   • DM2 (diabetes mellitus, type 2)   • CALVIN (generalized anxiety disorder)   • HLD (hyperlipidemia)   • Hypertension   • LARA (obstructive sleep apnea)   • Osteoarthritis of lumbar spine   • Oxygen dependent   • Porcelain gallbladder   • Tobacco dependence   • Partial small bowel obstruction       Past Surgical History:   Procedure Laterality Date   • CARDIAC CATHETERIZATION N/A 11/17/2016    Procedure: Left Heart Cath;  Surgeon: Fer Hsu MD;  Location: [facility] INVASIVE LOCATION;  Service:    • CORONARY ANGIOPLASTY WITH STENT PLACEMENT  2012   • CORONARY ARTERY BYPASS GRAFT  2009   • ORIF FOREARM FRACTURE Left 1996   • SMALL INTESTINE SURGERY  1950   • UPPER GASTROINTESTINAL ENDOSCOPY  2014       Allergies   Allergen Reactions   • Prednisone Other (See Comments)     Chest pain         Current Outpatient  Prescriptions:   •  amitriptyline (ELAVIL) 25 MG tablet, Take 25 mg by mouth At Night As Needed for Sleep., Disp: , Rfl:   •  aspirin EC 81 MG EC tablet, Take 1 tablet by mouth Daily for 360 days., Disp: 90 tablet, Rfl: 3  •  bumetanide (BUMEX) 1 MG tablet, Take 1 tablet by mouth 2 (Two) Times a Day., Disp: 180 tablet, Rfl: 1  •  carvedilol (COREG) 12.5 MG tablet, Take 1 tablet by mouth 2 (Two) Times a Day., Disp: 60 tablet, Rfl: 11  •  ceFAZolin in dextrose (ANCEF) 2-4 GM/100ML-% solution IVPB, Infuse 100 mL into a venous catheter Every 8 (Eight) Hours. Indications: Bacteria in the Blood, Disp: 100 mL, Rfl:   •  clonazePAM (KlonoPIN) 0.5 MG tablet, Take 0.5 mg by mouth Every Night., Disp: , Rfl:   •  clopidogrel (PLAVIX) 75 MG tablet, Take 1 tablet by mouth Daily for 360 days., Disp: 90 tablet, Rfl: 3  •  cyclobenzaprine (FLEXERIL) 10 MG tablet, Take 10 mg by mouth At Night As Needed for Muscle Spasms., Disp: , Rfl:   •  ferrous sulfate 325 (65 FE) MG tablet, Take 1 tablet by mouth Daily With Breakfast., Disp: 30 tablet, Rfl: 5  •  fluconazole (DIFLUCAN) 150 MG tablet, Take one pill today and then may repeat x 1 dose in 7 days, Disp: 2 tablet, Rfl: 0  •  gabapentin (NEURONTIN) 800 MG tablet, Take 800 mg by mouth 4 (Four) Times a Day., Disp: , Rfl:   •  HYDROcodone-acetaminophen (NORCO)  MG per tablet, Take 1 tablet by mouth Every 4 (Four) Hours As Needed for moderate pain (4-6)., Disp: , Rfl:   •  insulin aspart (novoLOG) 100 UNIT/ML injection, Inject 10 Units under the skin 3 (Three) Times a Day Before Meals., Disp: , Rfl:   •  insulin glargine (LANTUS) 100 UNIT/ML injection, Inject 20 Units under the skin 2 (Two) Times a Day. Advance dose as diet advances, Disp: , Rfl: 12  •  isosorbide mononitrate (IMDUR) 120 MG 24 hr tablet, Take 1 tablet by mouth Daily for 360 days., Disp: 90 tablet, Rfl: 3  •  levothyroxine (SYNTHROID, LEVOTHROID) 50 MCG tablet, Take 50 mcg by mouth Daily., Disp: , Rfl:   •  melatonin 5  MG tablet tablet, Take 1 tablet by mouth At Night As Needed (Sleep)., Disp: 90 tablet, Rfl: 0  •  NIACIN ER PO, Take 500 mg by mouth Daily., Disp: , Rfl:   •  nitroglycerin (NITROSTAT) 0.4 MG SL tablet, Place 0.4 mg under the tongue Every 5 (Five) Minutes As Needed for chest pain. Take no more than 3 doses in 15 minutes., Disp: , Rfl:   •  ondansetron ODT (ZOFRAN-ODT) 4 MG disintegrating tablet, Take 4 mg by mouth Every 8 (Eight) Hours As Needed for Nausea or Vomiting., Disp: , Rfl:   •  pantoprazole (PROTONIX) 40 MG EC tablet, Take 40 mg by mouth Daily., Disp: , Rfl:   •  PARoxetine (PAXIL) 20 MG tablet, Take 20 mg by mouth Every Morning., Disp: , Rfl:   •  potassium chloride (K-DUR,KLOR-CON) 20 MEQ CR tablet, Take 1 tablet by mouth 2 (Two) Times a Day., Disp: 60 tablet, Rfl: 3  •  QUEtiapine (SEROquel) 25 MG tablet, Take 1 tablet by mouth Every 12 (Twelve) Hours., Disp: 60 tablet, Rfl: 0  •  ROSUVASTATIN CALCIUM PO, Take 20 mg by mouth Daily., Disp: , Rfl:   •  tamsulosin (FLOMAX) 0.4 MG capsule 24 hr capsule, Take 1 capsule by mouth Daily. Take 1/2 hour following the same meal every day, Disp: , Rfl:   •  vitamin C (ASCORBIC ACID) 500 MG tablet, Take 1 tablet by mouth Daily., Disp: 30 tablet, Rfl: 5    The following portions of the chart were reviewed and updated as appropriate: Allergies, current medications, past family history, social history, past medical history.     Review of Systems   Constitution: Negative for chills, decreased appetite, diaphoresis, fever, weakness, malaise/fatigue, night sweats, weight gain and weight loss.   HENT: Negative for congestion, headaches, hearing loss, hoarse voice and nosebleeds.    Eyes: Negative for blurred vision, visual disturbance and visual halos.   Cardiovascular: Positive for dyspnea on exertion. Negative for chest pain, claudication, cyanosis, irregular heartbeat, leg swelling, near-syncope, orthopnea, palpitations, paroxysmal nocturnal dyspnea and syncope.  "  Respiratory: Positive for cough and shortness of breath. Negative for hemoptysis, sleep disturbances due to breathing, snoring, sputum production and wheezing.    Hematologic/Lymphatic: Negative for bleeding problem. Does not bruise/bleed easily.   Skin: Negative for dry skin, itching and rash.   Musculoskeletal: Negative for arthritis, joint pain, joint swelling and myalgias.   Gastrointestinal: Positive for bloating. Negative for abdominal pain, constipation, diarrhea, flatus, heartburn, hematemesis, hematochezia, melena, nausea and vomiting.   Genitourinary: Negative.  Negative for dysuria, frequency, hematuria, nocturia and urgency.   Neurological: Negative for excessive daytime sleepiness, dizziness, light-headedness and loss of balance.   Psychiatric/Behavioral: Negative for depression. The patient does not have insomnia and is not nervous/anxious.            Objective:     Vitals:    08/09/17 1146 08/09/17 1147   BP: 134/70 135/79   BP Location: Left arm Left arm   Patient Position: Sitting Standing   Cuff Size: Adult    Pulse: 72 79   Resp: 20    Temp: 97.2 °F (36.2 °C)    TempSrc: Temporal Artery     SpO2: 96%    Weight: 243 lb (110 kg)    Height: 71\" (180.3 cm)          Physical Exam   Constitutional: He is oriented to person, place, and time. He appears well-developed and well-nourished. He is active and cooperative. No distress.   HENT:   Head: Normocephalic and atraumatic.   Mouth/Throat: Oropharynx is clear and moist.   Eyes: Conjunctivae and EOM are normal. Pupils are equal, round, and reactive to light.   Neck: Normal range of motion. Neck supple. No JVD present. No tracheal deviation present. No thyromegaly present.   Cardiovascular: Normal rate, regular rhythm and intact distal pulses.    No murmur heard.  Pulmonary/Chest: Effort normal. He has rales.   Abdominal: Bowel sounds are normal. He exhibits distension. There is tenderness.   Musculoskeletal: Normal range of motion. He exhibits edema (1+ " edema noted bilaterally).   Neurological: He is alert and oriented to person, place, and time.   Skin: Skin is warm, dry and intact.   Psychiatric: He has a normal mood and affect. His behavior is normal.   Nursing note and vitals reviewed.      Lab and Diagnostic Review:      Results for orders placed or performed in visit on 08/09/17   Basic Metabolic Panel   Result Value Ref Range    Glucose 267 (H) 70 - 100 mg/dL    BUN 20 9 - 23 mg/dL    Creatinine 1.10 0.60 - 1.30 mg/dL    Sodium 138 132 - 146 mmol/L    Potassium 4.0 3.5 - 5.5 mmol/L    Chloride 99 99 - 109 mmol/L    CO2 32.0 (H) 20.0 - 31.0 mmol/L    Calcium 8.6 (L) 8.7 - 10.4 mg/dL    eGFR Non African Amer 67 >60 mL/min/1.73    BUN/Creatinine Ratio 18.2 7.0 - 25.0    Anion Gap 7.0 3.0 - 11.0 mmol/L   BNP   Result Value Ref Range    .0 (H) 0.0 - 100.0 pg/mL   Magnesium   Result Value Ref Range    Magnesium 1.6 1.3 - 2.7 mg/dL   Iron Profile   Result Value Ref Range    Iron 33 (L) 50 - 175 mcg/dL    TIBC 412 250 - 450 mcg/dL    Iron Saturation 8 (L) 20 - 50 %         Assessment and Plan:         1. Acute on chronic systolic heart failure  Increase bumex to 2mg qam and 1 mg qpm  Heart failure education today including signs and symptoms, the role of the heart failure center, daily weights, low sodium diet (less than 1500 mg per day), and daily physical activity. Reviewed HF Zones with patient and family.  Patient to continue current medications as previously ordered.   - BNP    2. Anemia of chronic disease  Continue ferrous sulfate as previously directed  - Iron Profile    3. Hypomagnesemia  Continue magnesium supplement  - Magnesium    4. Essential hypertension  Well controlled    - Basic Metabolic Panel    It has been a pleasure to participate in the care of this patient.  Patient was instructed to call the [facility] with any questions, concerns, or worsening symptoms.  * Please note that portions of this note were completed with a voice  recognition program. Efforts were made to edit the dictation but occasionally words are transcribed.

## 2017-08-16 NOTE — PROGRESS NOTES
Encounter Date:08/16/2017      Patient ID: Ariel Ugalde is a 67 y.o. male.        Subjective:     Chief Complaint: Follow-up   systolic heart failure  History of Present Illness patient presents to the heart and valve center today for ongoing evaluation of his systolic heart failure. He notes moderate improvement in his dyspnea after increasing his bumex dose last week. He notes that his Picc line has been discontinued. He notes fatigue, dyspnea on exertion, abdominal fullness. He also reports intermittent dizziness and lightheadedness with position changes.   Patient denies chest pain, chest pressure, palpitations, tachycardia, syncope, orthopnea, PND, abdominal fullness, early satiety, claudication, cough or edema.  Patient Active Problem List   Diagnosis   • SBO (small bowel obstruction)   • Aortic sclerosis   • CAD (coronary artery disease)   • Chronic low back pain   • Chronic narcotic use   • Chronic venous insufficiency   • Chronically on benzodiazepine therapy   • CKD (chronic kidney disease) stage 3, GFR 30-59 ml/min   • COPD (chronic obstructive pulmonary disease)   • DM2 (diabetes mellitus, type 2)   • CALVIN (generalized anxiety disorder)   • HLD (hyperlipidemia)   • Hypertension   • LARA (obstructive sleep apnea)   • Osteoarthritis of lumbar spine   • Oxygen dependent   • Porcelain gallbladder   • Tobacco dependence   • Partial small bowel obstruction       Past Surgical History:   Procedure Laterality Date   • CARDIAC CATHETERIZATION N/A 11/17/2016    Procedure: Left Heart Cath;  Surgeon: Fer Hsu MD;  Location: [facility] CATH INVASIVE LOCATION;  Service:    • CORONARY ANGIOPLASTY WITH STENT PLACEMENT  2012   • CORONARY ARTERY BYPASS GRAFT  2009   • ORIF FOREARM FRACTURE Left 1996   • SMALL INTESTINE SURGERY  1950   • UPPER GASTROINTESTINAL ENDOSCOPY  2014       Allergies   Allergen Reactions   • Prednisone Other (See Comments)     Chest pain         Current Outpatient Prescriptions:   •   amitriptyline (ELAVIL) 25 MG tablet, Take 25 mg by mouth At Night As Needed for Sleep., Disp: , Rfl:   •  aspirin EC 81 MG EC tablet, Take 1 tablet by mouth Daily for 360 days., Disp: 90 tablet, Rfl: 3  •  bumetanide (BUMEX) 1 MG tablet, Take 1 tablet by mouth 2 (Two) Times a Day., Disp: 180 tablet, Rfl: 1  •  carvedilol (COREG) 12.5 MG tablet, Take 1 tablet by mouth 2 (Two) Times a Day., Disp: 60 tablet, Rfl: 11  •  ceFAZolin in dextrose (ANCEF) 2-4 GM/100ML-% solution IVPB, Infuse 100 mL into a venous catheter Every 8 (Eight) Hours. Indications: Bacteria in the Blood, Disp: 100 mL, Rfl:   •  clonazePAM (KlonoPIN) 0.5 MG tablet, Take 0.5 mg by mouth Every Night., Disp: , Rfl:   •  clopidogrel (PLAVIX) 75 MG tablet, Take 1 tablet by mouth Daily for 360 days., Disp: 90 tablet, Rfl: 3  •  cyclobenzaprine (FLEXERIL) 10 MG tablet, Take 10 mg by mouth At Night As Needed for Muscle Spasms., Disp: , Rfl:   •  ferrous sulfate 325 (65 FE) MG tablet, Take 1 tablet by mouth Daily With Breakfast., Disp: 30 tablet, Rfl: 5  •  fluconazole (DIFLUCAN) 150 MG tablet, Take one pill today and then may repeat x 1 dose in 7 days, Disp: 2 tablet, Rfl: 0  •  gabapentin (NEURONTIN) 800 MG tablet, Take 800 mg by mouth 4 (Four) Times a Day., Disp: , Rfl:   •  HYDROcodone-acetaminophen (NORCO)  MG per tablet, Take 1 tablet by mouth Every 4 (Four) Hours As Needed for moderate pain (4-6)., Disp: , Rfl:   •  insulin aspart (novoLOG) 100 UNIT/ML injection, Inject 10 Units under the skin 3 (Three) Times a Day Before Meals., Disp: , Rfl:   •  insulin glargine (LANTUS) 100 UNIT/ML injection, Inject 20 Units under the skin 2 (Two) Times a Day. Advance dose as diet advances, Disp: , Rfl: 12  •  isosorbide mononitrate (IMDUR) 120 MG 24 hr tablet, Take 1 tablet by mouth Daily for 360 days., Disp: 90 tablet, Rfl: 3  •  levothyroxine (SYNTHROID, LEVOTHROID) 50 MCG tablet, Take 50 mcg by mouth Daily., Disp: , Rfl:   •  melatonin 5 MG tablet tablet,  Take 1 tablet by mouth At Night As Needed (Sleep)., Disp: 90 tablet, Rfl: 0  •  NIACIN ER PO, Take 500 mg by mouth Daily., Disp: , Rfl:   •  nitroglycerin (NITROSTAT) 0.4 MG SL tablet, Place 0.4 mg under the tongue Every 5 (Five) Minutes As Needed for chest pain. Take no more than 3 doses in 15 minutes., Disp: , Rfl:   •  ondansetron ODT (ZOFRAN-ODT) 4 MG disintegrating tablet, Take 4 mg by mouth Every 8 (Eight) Hours As Needed for Nausea or Vomiting., Disp: , Rfl:   •  pantoprazole (PROTONIX) 40 MG EC tablet, Take 40 mg by mouth Daily., Disp: , Rfl:   •  PARoxetine (PAXIL) 20 MG tablet, Take 20 mg by mouth Every Morning., Disp: , Rfl:   •  potassium chloride (K-DUR,KLOR-CON) 20 MEQ CR tablet, Take 1 tablet by mouth 2 (Two) Times a Day., Disp: 60 tablet, Rfl: 3  •  QUEtiapine (SEROquel) 25 MG tablet, Take 1 tablet by mouth Every 12 (Twelve) Hours., Disp: 60 tablet, Rfl: 0  •  ROSUVASTATIN CALCIUM PO, Take 20 mg by mouth Daily., Disp: , Rfl:   •  tamsulosin (FLOMAX) 0.4 MG capsule 24 hr capsule, Take 1 capsule by mouth Daily. Take 1/2 hour following the same meal every day, Disp: , Rfl:   •  vitamin C (ASCORBIC ACID) 500 MG tablet, Take 1 tablet by mouth Daily., Disp: 30 tablet, Rfl: 5    The following portions of the chart were reviewed and updated as appropriate: Allergies, current medications, past family history, social history, past medical history.     Review of Systems   Constitution: Positive for malaise/fatigue. Negative for chills, decreased appetite, diaphoresis, fever, weakness, night sweats, weight gain and weight loss.   HENT: Negative for congestion, headaches and nosebleeds.    Eyes: Negative for blurred vision, visual disturbance and visual halos.   Cardiovascular: Positive for dyspnea on exertion. Negative for chest pain, claudication, cyanosis, irregular heartbeat, leg swelling, near-syncope, orthopnea, palpitations, paroxysmal nocturnal dyspnea and syncope.   Respiratory: Positive for cough,  "snoring, sputum production and wheezing. Negative for hemoptysis and sleep disturbances due to breathing.    Endocrine: Positive for polydipsia and polyphagia. Negative for cold intolerance, heat intolerance and polyuria.   Hematologic/Lymphatic: Bruises/bleeds easily.   Skin: Negative for dry skin, itching and rash.   Musculoskeletal: Positive for falls, joint pain, muscle cramps and muscle weakness. Negative for joint swelling and myalgias.   Gastrointestinal: Positive for dysphagia and heartburn. Negative for bloating, abdominal pain, constipation, diarrhea, melena, nausea and vomiting.   Genitourinary: Negative for dysuria, flank pain, hematuria and nocturia.   Neurological: Positive for excessive daytime sleepiness, dizziness, light-headedness, loss of balance and tremors. Negative for difficulty with concentration.   Psychiatric/Behavioral: Positive for altered mental status and memory loss. Negative for depression. The patient is nervous/anxious.    Allergic/Immunologic: Negative for environmental allergies.           Objective:     Vitals:    08/16/17 1359 08/16/17 1400   BP: 134/69 115/57   BP Location: Left arm Left arm   Patient Position: Sitting Standing   Pulse: 67    Resp: 18    Temp: 98.3 °F (36.8 °C)    TempSrc: Temporal Artery     SpO2: 96%    Weight: 245 lb (111 kg)    Height: 71\" (180.3 cm)          Physical Exam   Constitutional: He is oriented to person, place, and time. He appears well-developed and well-nourished. He is active and cooperative. No distress.   HENT:   Head: Normocephalic and atraumatic.   Mouth/Throat: Oropharynx is clear and moist.   Eyes: Conjunctivae and EOM are normal. Pupils are equal, round, and reactive to light.   Neck: Normal range of motion. Neck supple. No JVD present. No tracheal deviation present. No thyromegaly present.   Cardiovascular: Normal rate, regular rhythm, normal heart sounds and intact distal pulses.    Pulmonary/Chest: Effort normal and breath sounds " normal.   Abdominal: Soft. Bowel sounds are normal. He exhibits no distension. There is no tenderness.   Musculoskeletal: Normal range of motion.   Neurological: He is alert and oriented to person, place, and time.   Skin: Skin is warm, dry and intact.   Psychiatric: He has a normal mood and affect. His behavior is normal.   Nursing note and vitals reviewed.      Lab and Diagnostic Review:    8/14/17: gluc 280, bun 30, creatinine 1.21, K 4.7, , chloride 100, c02 28, ca 9.4, ast 11, alt 3, wbc 3.7, rbc 4.09, H and H 9.9/32.2, plt 78      Assessment and Plan:         1. Chronic systolic congestive heart failure  euvolemic  Heart failure education today including signs and symptoms, the role of the heart failure center, daily weights, low sodium diet (less than 1500 mg per day), and daily physical activity. Reviewed HF Zones with patient and family.  Patient to continue current medications as previously ordered.     2. Paroxysmal atrial fibrillation  Maintaining nsr  No anticoagulation due to GI bleed  3. Essential hypertension  Under good control  HTN Education provided today including signs and symptoms, medication management, daily blood pressure monitoring. Patient encouraged to call the [facility] center with any abnormal readings.     4. Tobacco use  It is very important that he quit smoking. There are various alternatives available to help with this difficult task, but first and foremost, he must make a firm commitment and decision to quit. The nature of nicotine addiction is discussed. The correct use, cost and side effects of nicotine replacement therapy such as gum or patches was discussed. Bupropion and its cost (sometimes not covered fully by insurance) and side effects are reviewed.  I recommend he not allow potential costs of treatment to deter him from using nicotine replacement therapy or bupropion, as the long term economic and health benefits are obvious. Education time 4 minutes  It has  been a pleasure to participate in the care of this patient.  Patient was instructed to call the [facility] with any questions, concerns, or worsening symptoms.    * Please note that portions of this note were completed with a voice recognition program. Efforts were made to edit the dictation but occasionally words are transcribed.

## 2017-08-23 NOTE — PROGRESS NOTES
Encounter Date:08/23/2017      Patient ID: Ariel Ugalde is a 67 y.o. male.        Subjective:     Chief Complaint: Follow-up   SHF/CAD  History of Present Illness patient presents to the [facility] today for ongoing evaluation of his chronic systolic heart failure and CAD. Patient notes that he is feeling better than he has in a while. Patient notes fatigue, intermittent palpitations and intermittent dizziness and lightheadedness with position changes.  He does deny chest pain, chest pressure, presyncope, syncope, orthopnea, PND, abdominal fullness, early satiety, claudication,  or edema.    Patient Active Problem List   Diagnosis   • SBO (small bowel obstruction)   • Aortic sclerosis   • CAD (coronary artery disease)   • Chronic low back pain   • Chronic narcotic use   • Chronic venous insufficiency   • Chronically on benzodiazepine therapy   • CKD (chronic kidney disease) stage 3, GFR 30-59 ml/min   • COPD (chronic obstructive pulmonary disease)   • DM2 (diabetes mellitus, type 2)   • CALVIN (generalized anxiety disorder)   • HLD (hyperlipidemia)   • Hypertension   • LARA (obstructive sleep apnea)   • Osteoarthritis of lumbar spine   • Oxygen dependent   • Porcelain gallbladder   • Tobacco dependence   • Partial small bowel obstruction       Past Surgical History:   Procedure Laterality Date   • CARDIAC CATHETERIZATION N/A 11/17/2016    Procedure: Left Heart Cath;  Surgeon: Fer Hsu MD;  [facility];  Service:    • CORONARY ANGIOPLASTY WITH STENT PLACEMENT  2012   • CORONARY ARTERY BYPASS GRAFT  2009   • ORIF FOREARM FRACTURE Left 1996   • SMALL INTESTINE SURGERY  1950   • UPPER GASTROINTESTINAL ENDOSCOPY  2014       Allergies   Allergen Reactions   • Prednisone Other (See Comments)     Chest pain         Current Outpatient Prescriptions:   •  amitriptyline (ELAVIL) 25 MG tablet, Take 25 mg by mouth At Night As Needed for Sleep., Disp: , Rfl:   •  aspirin EC 81 MG EC  tablet, Take 1 tablet by mouth Daily for 360 days., Disp: 90 tablet, Rfl: 3  •  bumetanide (BUMEX) 1 MG tablet, Take 1 tablet by mouth 2 (Two) Times a Day., Disp: 180 tablet, Rfl: 1  •  carvedilol (COREG) 12.5 MG tablet, Take 1 tablet by mouth 2 (Two) Times a Day., Disp: 60 tablet, Rfl: 11  •  ceFAZolin in dextrose (ANCEF) 2-4 GM/100ML-% solution IVPB, Infuse 100 mL into a venous catheter Every 8 (Eight) Hours. Indications: Bacteria in the Blood, Disp: 100 mL, Rfl:   •  clonazePAM (KlonoPIN) 0.5 MG tablet, Take 0.5 mg by mouth Every Night., Disp: , Rfl:   •  clopidogrel (PLAVIX) 75 MG tablet, Take 1 tablet by mouth Daily for 360 days., Disp: 90 tablet, Rfl: 3  •  cyclobenzaprine (FLEXERIL) 10 MG tablet, Take 10 mg by mouth At Night As Needed for Muscle Spasms., Disp: , Rfl:   •  ferrous sulfate 325 (65 FE) MG tablet, Take 1 tablet by mouth Daily With Breakfast., Disp: 30 tablet, Rfl: 5  •  fluconazole (DIFLUCAN) 150 MG tablet, Take one pill today and then may repeat x 1 dose in 7 days, Disp: 2 tablet, Rfl: 0  •  gabapentin (NEURONTIN) 800 MG tablet, Take 800 mg by mouth 4 (Four) Times a Day., Disp: , Rfl:   •  HYDROcodone-acetaminophen (NORCO)  MG per tablet, Take 1 tablet by mouth Every 4 (Four) Hours As Needed for moderate pain (4-6)., Disp: , Rfl:   •  insulin aspart (novoLOG) 100 UNIT/ML injection, Inject 10 Units under the skin 3 (Three) Times a Day Before Meals., Disp: , Rfl:   •  insulin glargine (LANTUS) 100 UNIT/ML injection, Inject 20 Units under the skin 2 (Two) Times a Day. Advance dose as diet advances, Disp: , Rfl: 12  •  isosorbide mononitrate (IMDUR) 120 MG 24 hr tablet, Take 1 tablet by mouth Daily for 360 days., Disp: 90 tablet, Rfl: 3  •  levothyroxine (SYNTHROID, LEVOTHROID) 50 MCG tablet, Take 50 mcg by mouth Daily., Disp: , Rfl:   •  melatonin 5 MG tablet tablet, Take 1 tablet by mouth At Night As Needed (Sleep)., Disp: 90 tablet, Rfl: 0  •  NIACIN ER PO, Take 500 mg by mouth Daily.,  Disp: , Rfl:   •  nitroglycerin (NITROSTAT) 0.4 MG SL tablet, Place 0.4 mg under the tongue Every 5 (Five) Minutes As Needed for chest pain. Take no more than 3 doses in 15 minutes., Disp: , Rfl:   •  ondansetron ODT (ZOFRAN-ODT) 4 MG disintegrating tablet, Take 4 mg by mouth Every 8 (Eight) Hours As Needed for Nausea or Vomiting., Disp: , Rfl:   •  pantoprazole (PROTONIX) 40 MG EC tablet, Take 40 mg by mouth Daily., Disp: , Rfl:   •  PARoxetine (PAXIL) 20 MG tablet, Take 20 mg by mouth Every Morning., Disp: , Rfl:   •  potassium chloride (K-DUR,KLOR-CON) 20 MEQ CR tablet, Take 1 tablet by mouth 2 (Two) Times a Day., Disp: 60 tablet, Rfl: 3  •  QUEtiapine (SEROquel) 25 MG tablet, Take 1 tablet by mouth Every 12 (Twelve) Hours., Disp: 60 tablet, Rfl: 0  •  ROSUVASTATIN CALCIUM PO, Take 20 mg by mouth Daily., Disp: , Rfl:   •  tamsulosin (FLOMAX) 0.4 MG capsule 24 hr capsule, Take 1 capsule by mouth Daily. Take 1/2 hour following the same meal every day, Disp: , Rfl:   •  vitamin C (ASCORBIC ACID) 500 MG tablet, Take 1 tablet by mouth Daily., Disp: 30 tablet, Rfl: 5    The following portions of the chart were reviewed and updated as appropriate: Allergies, current medications, past family history, social history, past medical history.     Review of Systems   Constitution: Positive for malaise/fatigue. Negative for chills, decreased appetite, diaphoresis, fever, weakness, night sweats, weight gain and weight loss.   HENT: Negative for congestion, headaches, hearing loss, hoarse voice and nosebleeds.    Eyes: Positive for blurred vision. Negative for visual disturbance and visual halos.   Cardiovascular: Positive for leg swelling and palpitations. Negative for chest pain, claudication, cyanosis, dyspnea on exertion, irregular heartbeat, near-syncope, orthopnea, paroxysmal nocturnal dyspnea and syncope.   Respiratory: Positive for cough, snoring, sputum production and wheezing. Negative for hemoptysis, shortness of  "breath and sleep disturbances due to breathing.    Hematologic/Lymphatic: Negative for bleeding problem. Does not bruise/bleed easily.   Skin: Negative for dry skin, itching and rash.   Musculoskeletal: Positive for joint pain. Negative for arthritis, falls, joint swelling and myalgias.   Gastrointestinal: Positive for dysphagia and heartburn. Negative for bloating, abdominal pain, constipation, diarrhea, flatus, hematemesis, hematochezia, melena, nausea and vomiting.   Genitourinary: Positive for dysuria. Negative for frequency, hematuria, nocturia and urgency.   Neurological: Positive for excessive daytime sleepiness, dizziness, light-headedness and loss of balance.   Psychiatric/Behavioral: Positive for memory loss. Negative for depression. The patient is nervous/anxious. The patient does not have insomnia.            Objective:     Vitals:    08/23/17 1309 08/23/17 1311 08/23/17 1312   BP: 131/94 148/85 130/82   BP Location: Right arm Left arm Left arm   Patient Position: Sitting Sitting Standing   Cuff Size: Adult     Pulse: 65  69   Resp: 20     Temp: 97.7 °F (36.5 °C)     TempSrc: Temporal Artery      SpO2: 97%     Weight: 239 lb (108 kg)     Height: 71\" (180.3 cm)           Physical Exam   Constitutional: He is oriented to person, place, and time. He appears well-developed and well-nourished. He is active and cooperative. No distress.   HENT:   Head: Normocephalic and atraumatic.   Mouth/Throat: Oropharynx is clear and moist.   Eyes: Conjunctivae and EOM are normal. Pupils are equal, round, and reactive to light.   Neck: Normal range of motion. Neck supple. No JVD present. No tracheal deviation present. No thyromegaly present.   Cardiovascular: Normal rate, regular rhythm, normal heart sounds and intact distal pulses.    Pulmonary/Chest: Effort normal and breath sounds normal.   Abdominal: Soft. Bowel sounds are normal. He exhibits no distension. There is no tenderness.   Musculoskeletal: Normal range of " motion.   Neurological: He is alert and oriented to person, place, and time.   Skin: Skin is warm, dry and intact.   Psychiatric: He has a normal mood and affect. His behavior is normal.   Nursing note and vitals reviewed.      Lab and Diagnostic Review:      Results for orders placed or performed in visit on 08/23/17   Basic Metabolic Panel   Result Value Ref Range    Glucose 242 (H) 70 - 100 mg/dL    BUN 23 9 - 23 mg/dL    Creatinine 1.30 0.60 - 1.30 mg/dL    Sodium 141 132 - 146 mmol/L    Potassium 5.1 3.5 - 5.5 mmol/L    Chloride 106 99 - 109 mmol/L    CO2 29.0 20.0 - 31.0 mmol/L    Calcium 9.4 8.7 - 10.4 mg/dL    eGFR Non African Amer 55 (L) >60 mL/min/1.73    BUN/Creatinine Ratio 17.7 7.0 - 25.0    Anion Gap 6.0 3.0 - 11.0 mmol/L   CBC Auto Differential   Result Value Ref Range    WBC 4.45 3.50 - 10.80 10*3/mm3    RBC 4.50 4.20 - 5.76 10*6/mm3    Hemoglobin 10.8 (L) 13.1 - 17.5 g/dL    Hematocrit 38.7 (L) 38.9 - 50.9 %    MCV 86.0 80.0 - 99.0 fL    MCH 24.0 (L) 27.0 - 31.0 pg    MCHC 27.9 (L) 32.0 - 36.0 g/dL    RDW 22.7 (H) 11.3 - 14.5 %    RDW-SD 70.3 (H) 37.0 - 54.0 fl    MPV 9.6 6.0 - 12.0 fL    Platelets 129 (L) 150 - 450 10*3/mm3    Neutrophil % 60.5 41.0 - 71.0 %    Lymphocyte % 27.9 24.0 - 44.0 %    Monocyte % 10.3 0.0 - 12.0 %    Eosinophil % 0.2 0.0 - 3.0 %    Basophil % 0.9 0.0 - 1.0 %    Immature Grans % 0.2 0.0 - 0.6 %    Neutrophils, Absolute 2.69 1.50 - 8.30 10*3/mm3    Lymphocytes, Absolute 1.24 0.60 - 4.80 10*3/mm3    Monocytes, Absolute 0.46 0.00 - 1.00 10*3/mm3    Eosinophils, Absolute 0.01 0.00 - 0.30 10*3/mm3    Basophils, Absolute 0.04 0.00 - 0.20 10*3/mm3    Immature Grans, Absolute 0.01 0.00 - 0.03 10*3/mm3       Assessment and Plan:         1. Chronic systolic congestive heart failure  euvolemic  Heart failure education today including signs and symptoms, the role of the heart failure center, daily weights, low sodium diet (less than 1500 mg per day), and daily physical activity.  Reviewed HF Zones with patient and family.  Patient to continue current medications as previously ordered.     2. Essential hypertension  Well controlled  HTN Education provided today including signs and symptoms, medication management, daily blood pressure monitoring. Patient encouraged to call the [facility] Val[facility] center with any abnormal readings.     3. CKD (chronic kidney disease) stage 3, GFR 30-59 ml/min  Creatinine today 1.3, stable    4. Coronary artery disease involving native heart without angina pectoris, unspecified vessel or lesion type  Without angina  Continue asa, coreg, plavix, imdur, rosuvastatin    It has been a pleasure to participate in the care of this patient.  Patient was instructed to call the [facility] with any questions, concerns, or worsening symptoms.  * Please note that portions of this note were completed with a voice recognition program. Efforts were made to edit the dictation but occasionally words are transcribed.

## 2017-10-06 PROBLEM — M19.90 ARTHRITIS: Status: ACTIVE | Noted: 2017-01-01

## 2017-10-06 PROBLEM — I63.9 STROKE (HCC): Status: ACTIVE | Noted: 2017-01-01

## 2017-10-06 PROBLEM — I25.9 ISCHEMIC HEART DISEASE: Status: ACTIVE | Noted: 2017-01-01

## 2017-10-06 PROBLEM — G43.909 MIGRAINE: Status: ACTIVE | Noted: 2017-01-01

## 2017-10-06 PROBLEM — I51.9 CARDIAC DISEASE: Status: ACTIVE | Noted: 2017-01-01

## 2017-10-06 PROBLEM — C80.1 CANCER (HCC): Status: ACTIVE | Noted: 2017-01-01

## 2017-10-06 PROBLEM — F10.20 ALCOHOLISM (HCC): Status: ACTIVE | Noted: 2017-01-01

## 2017-10-11 PROBLEM — G25.0 ESSENTIAL TREMOR: Status: ACTIVE | Noted: 2017-01-01

## 2017-10-11 PROBLEM — F41.9 ANXIETY: Status: ACTIVE | Noted: 2017-01-01

## 2017-10-11 PROBLEM — G93.40 ENCEPHALOPATHY: Status: ACTIVE | Noted: 2017-01-01

## 2017-10-11 PROBLEM — Z86.73 HISTORY OF STROKE: Status: ACTIVE | Noted: 2017-01-01

## 2017-10-11 NOTE — PROGRESS NOTES
Subjective:     Patient ID: Ariel Ugalde is a 67 y.o. male.    CC:   Chief Complaint   Patient presents with   • Tremors       HPI:   History of Present Illness  The following portions of the patient's history were reviewed and updated as appropriate: allergies, current medications, past family history, past medical history, past social history, past surgical history and problem list.     66 y/o WM with history of occipital stroke in 2014, MRI of brain showing advanced microangiopathy. Admitted at [facility] in July for abdominal pain, seen by Dr. Arellano for delirium/encephalopathy, thought to have LBD because of intermittent confusion, shuffling, and tremor. Started on Seroquel which has helped with agitation. History significant for alcohol abuse. Complains of hand tremor worse with activity and in am. Takes low dose Klonopin for chronic insomnia at night only. Drinks 2 cups of coffee in am, 4-6 diet pops a day.      Past Medical History:   Diagnosis Date   • Aortic sclerosis 01/2016   • CAD (coronary artery disease)    • CHF (congestive heart failure)    • Chronic low back pain    • Chronic narcotic use    • Chronic venous insufficiency    • Chronically on benzodiazepine therapy    • CKD (chronic kidney disease) stage 3, GFR 30-59 ml/min    • Colonoscopy refused    • COPD (chronic obstructive pulmonary disease)    • Depression    • DM2 (diabetes mellitus, type 2)    • CALVIN (generalized anxiety disorder)    • History of MI (myocardial infarction)    • HLD (hyperlipidemia)    • Hypertension    • Obesity    • LARA (obstructive sleep apnea)    • Osteoarthritis of lumbar spine    • Oxygen dependent    • Paroxysmal atrial fibrillation 05/06/2014   • Peripheral neuropathy    • Porcelain gallbladder    • Tobacco dependence        Past Surgical History:   Procedure Laterality Date   • CARDIAC CATHETERIZATION N/A 11/17/2016    Procedure: Left Heart Cath;  Surgeon: Fer Hsu MD;  Location: [facility] INVASIVE LOCATION;   Service:    • CORONARY ANGIOPLASTY WITH STENT PLACEMENT  2012   • CORONARY ARTERY BYPASS GRAFT  2009   • ORIF FOREARM FRACTURE Left 1996   • SMALL INTESTINE SURGERY  1950   • UPPER GASTROINTESTINAL ENDOSCOPY  2014       Social History     Social History   • Marital status:      Spouse name: Karlie   • Number of children: 6   • Years of education: H.S.     Occupational History   •  Retired     Social History Main Topics   • Smoking status: Current Every Day Smoker     Packs/day: 1.50     Years: 50.00     Types: Cigarettes   • Smokeless tobacco: Never Used   • Alcohol use No   • Drug use: No   • Sexual activity: Not Currently     Partners: Female      Comment:      Other Topics Concern   • Not on file     Social History Narrative    Caffeine: 3-6 servings per day    Patient lives at home with his wife.       Family History   Problem Relation Age of Onset   • Diabetes Mother    • Hypertension Mother    • Heart disease Mother    • Cancer Mother    • Stroke Father    • Diabetes Father    • Hypertension Father    • Heart disease Father    • Cancer Father    • Anxiety disorder Sister    • Alcohol abuse Brother    • Stroke Brother         Review of Systems   Constitutional: Positive for fatigue. Negative for chills, fever and unexpected weight change.   HENT: Positive for ear pain and hearing loss. Negative for nosebleeds, rhinorrhea and sore throat.    Eyes: Positive for discharge, itching and visual disturbance. Negative for photophobia and pain.   Respiratory: Positive for cough, shortness of breath and wheezing. Negative for chest tightness.    Cardiovascular: Positive for palpitations and leg swelling. Negative for chest pain.   Gastrointestinal: Positive for abdominal pain and nausea. Negative for blood in stool, constipation, diarrhea and vomiting.   Endocrine: Positive for cold intolerance.   Genitourinary: Positive for difficulty urinating and testicular pain. Negative for dysuria, frequency, hematuria  and urgency.   Musculoskeletal: Positive for back pain, gait problem, joint swelling, myalgias and neck pain. Negative for arthralgias and neck stiffness.   Skin: Negative for rash and wound.   Allergic/Immunologic: Negative for environmental allergies and food allergies.   Neurological: Positive for dizziness, tremors, speech difficulty, weakness, light-headedness, numbness and headaches. Negative for seizures and syncope.   Hematological: Negative for adenopathy. Bruises/bleeds easily.   Psychiatric/Behavioral: Positive for agitation, confusion, decreased concentration, dysphoric mood and sleep disturbance. Negative for hallucinations and suicidal ideas. The patient is nervous/anxious.         Objective:    Neurologic Exam     Mental Status   Oriented to person, place, and time.       Physical Exam   Constitutional: He is oriented to person, place, and time. He appears well-developed and well-nourished.   Cardiovascular: Normal rate and regular rhythm.    Pulmonary/Chest: Effort normal.   Neurological: He is alert and oriented to person, place, and time. He has normal reflexes.   Slight kinetic right hand tremor, no rigidity or resting tremor.   Psychiatric: He has a normal mood and affect. His behavior is normal. Thought content normal.       Assessment/Plan:       Ariel was seen today for tremors.    Diagnoses and all orders for this visit:    Encephalopathy, resolved.  - Favor hospital delirium, underlying mild vascular dementia over LBD.  Essential tremor  - Consider changing Klonopin to bid, decrease caffeine and alcohol use, stop Seroquel as tolerated..  Anxiety    History of stroke  - Report new symptoms immediately.         Kwame Hernandes MD  10/11/2017

## 2017-12-18 NOTE — PROGRESS NOTES
Subjective:     Encounter Date:12/18/2017      Patient ID: Ariel Ugalde is a 67 y.o. male.    Chief Complaint: Coronary Artery Disease and Chronic systolic congestive heart failure    PROBLEM LIST:  1. Coronary artery disease:  a. CABG, 2000.  b. Redo CABG in 2009.  c. NSTEMI Cardiac catheterization, 11/17/2016: 95% ostial/proximal SVG to PDA treated with 3.0 x 15 Xience EES. 90% distal left main/ostial left circumflex treated with 3.5 x 12 Xience EES. Proximal LAD disease with patent CALVIN graft. Chronically occluded RCA. Large ectatic/aneurysmal saphenous vein graft to ramus branch. Diffuse disease, but nonflow limiting (50%). Elevated left ventricular end-diastolic pressures.  2. Aortic stenosis   a. Echocardiogram, January 2016, mild to moderate aortic sclerosis and mild mitral regurgitation with normal ejection fraction.  b. EF 50%. LV wall thickness is consistent with mild concentric hypertrophy. Mild to moderate aortic valve stenosis is present.  3. Paroxysmal atrial fibrillation  a. Status post DAKOTAH and cardioversion, maintaining sinus rhythm by EKG 05/06/2014.   4. Chronic systolic congestive heart failure.  5. Hypertension.  6. Dyslipidemia.  7. COPD (chornic obstructive pulmonary disease).  a. CXR 5/30/17: Cardiomegaly and moderate pulmonary venous hypertension. No evidence of overt congestive failure.  8. Diabetes mellitus, type 2 (currently insulin dependent).  9. Peripheral neuropathy.  10. CKD (chornic kidney disease).  11. Chronic anemia.  12. Obesity.  13. Left lower extremity phlebitis.  14. Chronic lower extremity edema.  15. Remote left arm fracture, status post surgical revision in 1996.  16. History of osteoarthritis.  17. History of vertigo.  18. History of sleep apnea.  19. History of depression/anxiety.  20. Ongoing tobacco use/chronic obstructive pulmonary disease.  21. Other remote surgical history.  a. Remote bowel surgery.    History of Present Illness  Ariel Ugalde returns today  for a 6 month follow up with a history of coronary artery disease and acute systolic congestive heart failure among other cardiac risk factors. Since last visit he has been stable from a cardiovascular standpoint. Over the past six months, he has reported to the emergency room on several occasions. His most recent admission was for pneumonia which has resolved and he is beginning to feel stronger. Experiences bouts of syncope, which may have been due to the pneumonia. Otherwise describes occasional leg swelling. Denies any exertional chest pain, orthopnea, PND, or palpitations. Mr. Ugalde tries to exercise, but is limited because he feels weak. He mows and weeds his yard, but needs to take his time in doing so. Continues to smoke.     Allergies   Allergen Reactions   • Prednisone Other (See Comments)     Chest pain       Current Outpatient Prescriptions:   •  amitriptyline (ELAVIL) 25 MG tablet, Take 25 mg by mouth At Night As Needed for Sleep., Disp: , Rfl:   •  aspirin 81 MG EC tablet, Take 81 mg by mouth Daily., Disp: , Rfl:   •  bumetanide (BUMEX) 1 MG tablet, Take 1 tablet by mouth 2 (Two) Times a Day. (Patient taking differently: Take 1 mg by mouth Every 3 (Three) Days. 2 tablets every 3 days and 3 tablets the next 3 days and repeat), Disp: 180 tablet, Rfl: 1  •  carvedilol (COREG) 12.5 MG tablet, Take 1 tablet by mouth 2 (Two) Times a Day., Disp: 60 tablet, Rfl: 11  •  clonazePAM (KlonoPIN) 0.5 MG tablet, Take 0.5 mg by mouth Every Night., Disp: , Rfl:   •  clopidogrel (PLAVIX) 75 MG tablet, Take 75 mg by mouth Daily., Disp: , Rfl:   •  cyclobenzaprine (FLEXERIL) 10 MG tablet, Take 10 mg by mouth At Night As Needed for Muscle Spasms., Disp: , Rfl:   •  donepezil (ARICEPT) 5 MG tablet, Take 5 mg by mouth Every Night., Disp: , Rfl:   •  ferrous sulfate 325 (65 FE) MG tablet, Take 1 tablet by mouth Daily With Breakfast., Disp: 30 tablet, Rfl: 5  •  fluconazole (DIFLUCAN) 150 MG tablet, Take one pill today and  then may repeat x 1 dose in 7 days, Disp: 2 tablet, Rfl: 0  •  gabapentin (NEURONTIN) 800 MG tablet, Take 800 mg by mouth 4 (Four) Times a Day., Disp: , Rfl:   •  HYDROcodone-acetaminophen (NORCO)  MG per tablet, Take 1 tablet by mouth Every 4 (Four) Hours As Needed for moderate pain (4-6)., Disp: , Rfl:   •  insulin aspart (novoLOG) 100 UNIT/ML injection, Inject 10 Units under the skin 3 (Three) Times a Day Before Meals., Disp: , Rfl:   •  insulin glargine (LANTUS) 100 UNIT/ML injection, Inject 20 Units under the skin 2 (Two) Times a Day. Advance dose as diet advances (Patient taking differently: Inject 60 Units under the skin 2 (Two) Times a Day. Advance dose as diet advances), Disp: , Rfl: 12  •  isosorbide mononitrate (IMDUR) 120 MG 24 hr tablet, Daily., Disp: , Rfl: 2  •  levothyroxine (SYNTHROID, LEVOTHROID) 50 MCG tablet, Take 50 mcg by mouth Daily., Disp: , Rfl:   •  Magnesium 400 MG capsule, Take  by mouth 2 (Two) Times a Day., Disp: , Rfl:   •  NIACIN ER PO, Take 500 mg by mouth Daily., Disp: , Rfl:   •  nitroglycerin (NITROSTAT) 0.4 MG SL tablet, Place 0.4 mg under the tongue Every 5 (Five) Minutes As Needed for chest pain. Take no more than 3 doses in 15 minutes., Disp: , Rfl:   •  ondansetron ODT (ZOFRAN-ODT) 4 MG disintegrating tablet, Take 4 mg by mouth Every 8 (Eight) Hours As Needed for Nausea or Vomiting., Disp: , Rfl:   •  pantoprazole (PROTONIX) 40 MG EC tablet, Take 40 mg by mouth Daily., Disp: , Rfl:   •  PARoxetine (PAXIL) 40 MG tablet, Take 40 mg by mouth Every Morning., Disp: , Rfl:   •  potassium chloride (K-DUR,KLOR-CON) 20 MEQ CR tablet, Take 1 tablet by mouth 2 (Two) Times a Day., Disp: 60 tablet, Rfl: 3  •  tamsulosin (FLOMAX) 0.4 MG capsule 24 hr capsule, Take 1 capsule by mouth Daily. Take 1/2 hour following the same meal every day, Disp: , Rfl:   •  vitamin C (ASCORBIC ACID) 500 MG tablet, Take 1 tablet by mouth Daily., Disp: 30 tablet, Rfl: 5  •  metOLazone (ZAROXOLYN) 5 MG  "tablet, Take 1 tablet by mouth 3 (Three) Times a Week., Disp: 15 tablet, Rfl: 4  •  ROSUVASTATIN CALCIUM PO, Take 20 mg by mouth Daily., Disp: , Rfl:     The following portions of the patient's history were reviewed and updated as appropriate: allergies, current medications, past family history, past medical history, past social history, past surgical history and problem list.    Review of Systems   Constitution: Negative.   Cardiovascular: Negative.    Respiratory: Negative.    Hematologic/Lymphatic: Negative for bleeding problem. Does not bruise/bleed easily.   Skin: Negative for rash.   Musculoskeletal: Negative for muscle weakness and myalgias.   Gastrointestinal: Negative for heartburn, nausea and vomiting.   Neurological: Negative.         Objective:     Vitals:    12/18/17 0859   BP: 126/76   BP Location: Right arm   Patient Position: Sitting   Pulse: 63   SpO2: 94%   Weight: 107 kg (236 lb 12.8 oz)   Height: 180.3 cm (71\")         Physical Exam   Constitutional: He is oriented to person, place, and time. He appears well-developed and well-nourished.   HENT:   Mouth/Throat: Oropharynx is clear and moist.   Neck: No JVD present. Carotid bruit is not present. No thyromegaly present.   Cardiovascular: Regular rhythm, S1 normal, S2 normal and intact distal pulses.  Exam reveals no gallop, no S3 and no S4.    Murmur heard.   Harsh systolic murmur is present with a grade of 3/6  at the upper right sternal border radiating to the neck  Pulses:       Carotid pulses are 2+ on the right side, and 2+ on the left side.       Radial pulses are 2+ on the right side, and 2+ on the left side.   Pulmonary/Chest: Breath sounds normal.   Abdominal: Soft. Bowel sounds are normal. He exhibits no mass. There is no tenderness.   Musculoskeletal: He exhibits no edema.   Neurological: He is alert and oriented to person, place, and time.   Skin: Skin is warm and dry. No rash noted.     Lab Review:    Procedures        Assessment: "   Ariel was seen today for coronary artery disease and chronic systolic congestive heart failure.    Diagnoses and all orders for this visit:    Coronary artery disease involving native heart without angina pectoris, unspecified vessel or lesion type  -     Adult Transthoracic Echo Complete W/ Cont if Necessary Per Protocol; Future    Coronary artery disease involving native coronary artery of native heart, angina presence unspecified  -     Adult Transthoracic Echo Complete W/ Cont if Necessary Per Protocol; Future    Other orders  -     metOLazone (ZAROXOLYN) 5 MG tablet; Take 1 tablet by mouth 3 (Three) Times a Week.      Impression:  1. Coronary artery disease, status post revascularization no current angina  2. Aortic stenosis.  Moderate to severe by last echo, appear severe by examination.  3. Cardiomyopathy, no current heart failure she appears to be euvolemic today.  Nonetheless had worsening LV dysfunction by last echo need to rule out the left ear as the cause of this.  4. Tobacco abuse with COPD.  Ongoing.    Plan:  1. Order echocardiogram.  To see if he is a candidate for TAVR.  Given his redo CABG, and severe COPD I'm not sure his open surgical candidate.  2. Further recommendations pending the above  3. Continue current medications.  4. Revisit in 6 MO, or sooner as needed.    Scribed for Fer Hsu MD by Zeferino Rob. 12/18/2017  11:01 AM    I, Fer Hsu MD, personally performed the services described in this documentation as scribed by the above individual in my presence, and it is both accurate and complete      Please note that portions of this note may have been completed with a voice recognition program. Efforts were made to edit the dictations, but occasionally words are mistranscribed.

## 2017-12-23 PROBLEM — J18.9 PNEUMONIA: Status: ACTIVE | Noted: 2017-12-23

## 2017-12-24 PROBLEM — Z79.4 TYPE 2 DIABETES MELLITUS WITHOUT COMPLICATION, WITH LONG-TERM CURRENT USE OF INSULIN (HCC): Status: ACTIVE | Noted: 2017-12-24

## 2017-12-24 PROBLEM — I25.10 CAD (CORONARY ARTERY DISEASE): Status: ACTIVE | Noted: 2017-12-24

## 2017-12-24 PROBLEM — I10 BENIGN ESSENTIAL HTN: Status: ACTIVE | Noted: 2017-12-24

## 2017-12-24 PROBLEM — Z72.0 TOBACCO ABUSE: Status: ACTIVE | Noted: 2017-12-24

## 2017-12-24 PROBLEM — G47.33 OSA (OBSTRUCTIVE SLEEP APNEA): Status: ACTIVE | Noted: 2017-12-24

## 2017-12-24 PROBLEM — J44.1 COPD EXACERBATION (HCC): Status: ACTIVE | Noted: 2017-12-24

## 2017-12-24 PROBLEM — I48.0 PAROXYSMAL ATRIAL FIBRILLATION (HCC): Status: ACTIVE | Noted: 2017-12-24

## 2017-12-24 PROBLEM — R41.82 ALTERED MENTAL STATUS: Status: ACTIVE | Noted: 2017-12-24

## 2017-12-24 PROBLEM — E11.9 TYPE 2 DIABETES MELLITUS WITHOUT COMPLICATION, WITH LONG-TERM CURRENT USE OF INSULIN (HCC): Status: ACTIVE | Noted: 2017-12-24

## 2017-12-25 PROBLEM — D64.9 ANEMIA: Status: ACTIVE | Noted: 2017-12-25

## 2017-12-25 PROBLEM — I35.0 AORTIC STENOSIS: Status: ACTIVE | Noted: 2017-12-25

## 2018-01-01 ENCOUNTER — DOCUMENTATION (OUTPATIENT)
Dept: CARDIAC REHAB | Facility: HOSPITAL | Age: 68
End: 2018-01-01

## 2018-01-01 ENCOUNTER — HOSPITAL ENCOUNTER (OUTPATIENT)
Dept: PULMONOLOGY | Facility: HOSPITAL | Age: 68
Discharge: HOME | End: 2018-02-06

## 2018-01-01 ENCOUNTER — PREP FOR SURGERY (OUTPATIENT)
Dept: OTHER | Facility: HOSPITAL | Age: 68
End: 2018-01-01

## 2018-01-01 ENCOUNTER — HOSPITAL ENCOUNTER (INPATIENT)
Facility: HOSPITAL | Age: 68
LOS: 2 days | Discharge: HOME | End: 2018-02-24
Attending: THORACIC SURGERY (CARDIOTHORACIC VASCULAR SURGERY) | Admitting: THORACIC SURGERY (CARDIOTHORACIC VASCULAR SURGERY)

## 2018-01-01 ENCOUNTER — HOSPITAL ENCOUNTER (OUTPATIENT)
Dept: CARDIOLOGY | Facility: HOSPITAL | Age: 68
Discharge: HOME | End: 2018-01-03
Attending: INTERNAL MEDICINE | Admitting: INTERNAL MEDICINE

## 2018-01-01 ENCOUNTER — HOSPITAL ENCOUNTER (OUTPATIENT)
Dept: CARDIOLOGY | Facility: HOSPITAL | Age: 68
Discharge: HOME | End: 2018-01-12
Admitting: NURSE PRACTITIONER

## 2018-01-01 ENCOUNTER — APPOINTMENT (OUTPATIENT)
Dept: GENERAL RADIOLOGY | Facility: HOSPITAL | Age: 68
End: 2018-01-01

## 2018-01-01 ENCOUNTER — HOSPITAL ENCOUNTER (INPATIENT)
Dept: CARDIOLOGY | Facility: HOSPITAL | Age: 68
Discharge: HOME | End: 2018-02-22

## 2018-01-01 ENCOUNTER — APPOINTMENT (OUTPATIENT)
Dept: PREADMISSION TESTING | Facility: HOSPITAL | Age: 68
End: 2018-01-01

## 2018-01-01 ENCOUNTER — OFFICE VISIT (OUTPATIENT)
Dept: CARDIOLOGY | Facility: HOSPITAL | Age: 68
End: 2018-01-01

## 2018-01-01 ENCOUNTER — HOSPITAL ENCOUNTER (OUTPATIENT)
Dept: CARDIOLOGY | Facility: HOSPITAL | Age: 68
Setting detail: HOSPITAL OUTPATIENT SURGERY
Discharge: HOME | End: 2018-01-23

## 2018-01-01 ENCOUNTER — TELEPHONE (OUTPATIENT)
Dept: INTERVENTIONAL RADIOLOGY/VASCULAR | Facility: HOSPITAL | Age: 68
End: 2018-01-01

## 2018-01-01 ENCOUNTER — HOSPITAL ENCOUNTER (OUTPATIENT)
Dept: CARDIOLOGY | Facility: HOSPITAL | Age: 68
Discharge: HOME | End: 2018-03-02
Admitting: NURSE PRACTITIONER

## 2018-01-01 ENCOUNTER — TELEPHONE (OUTPATIENT)
Dept: CARDIOLOGY | Facility: HOSPITAL | Age: 68
End: 2018-01-01

## 2018-01-01 ENCOUNTER — HOSPITAL ENCOUNTER (OUTPATIENT)
Dept: CARDIOLOGY | Facility: HOSPITAL | Age: 68
Discharge: HOME | End: 2018-02-06

## 2018-01-01 ENCOUNTER — HOSPITAL ENCOUNTER (OUTPATIENT)
Facility: HOSPITAL | Age: 68
Discharge: HOME | End: 2018-01-23
Attending: INTERNAL MEDICINE | Admitting: INTERNAL MEDICINE

## 2018-01-01 ENCOUNTER — OFFICE VISIT (OUTPATIENT)
Dept: CARDIAC SURGERY | Facility: CLINIC | Age: 68
End: 2018-01-01

## 2018-01-01 ENCOUNTER — ANESTHESIA EVENT (OUTPATIENT)
Dept: PERIOP | Facility: HOSPITAL | Age: 68
End: 2018-01-01

## 2018-01-01 ENCOUNTER — ANESTHESIA (OUTPATIENT)
Dept: PERIOP | Facility: HOSPITAL | Age: 68
End: 2018-01-01

## 2018-01-01 ENCOUNTER — APPOINTMENT (OUTPATIENT)
Dept: CARDIOLOGY | Facility: HOSPITAL | Age: 68
End: 2018-01-01

## 2018-01-01 ENCOUNTER — TELEPHONE (OUTPATIENT)
Dept: CARDIOLOGY | Facility: CLINIC | Age: 68
End: 2018-01-01

## 2018-01-01 ENCOUNTER — APPOINTMENT (OUTPATIENT)
Dept: CT IMAGING | Facility: HOSPITAL | Age: 68
End: 2018-01-01

## 2018-01-01 ENCOUNTER — HOSPITAL ENCOUNTER (OUTPATIENT)
Dept: GENERAL RADIOLOGY | Facility: HOSPITAL | Age: 68
Discharge: HOME | End: 2018-02-21
Admitting: PHYSICIAN ASSISTANT

## 2018-01-01 ENCOUNTER — HOSPITAL ENCOUNTER (OUTPATIENT)
Dept: CT IMAGING | Facility: HOSPITAL | Age: 68
Discharge: HOME | End: 2018-02-06
Admitting: NURSE PRACTITIONER

## 2018-01-01 ENCOUNTER — APPOINTMENT (OUTPATIENT)
Dept: LAB | Facility: HOSPITAL | Age: 68
End: 2018-01-01

## 2018-01-01 VITALS
DIASTOLIC BLOOD PRESSURE: 53 MMHG | RESPIRATION RATE: 22 BRPM | HEIGHT: 71 IN | WEIGHT: 242.3 LBS | SYSTOLIC BLOOD PRESSURE: 122 MMHG | BODY MASS INDEX: 33.92 KG/M2 | OXYGEN SATURATION: 100 % | HEART RATE: 66 BPM | TEMPERATURE: 98.5 F

## 2018-01-01 VITALS
SYSTOLIC BLOOD PRESSURE: 124 MMHG | OXYGEN SATURATION: 96 % | WEIGHT: 236 LBS | DIASTOLIC BLOOD PRESSURE: 80 MMHG | HEART RATE: 90 BPM | HEIGHT: 71 IN | TEMPERATURE: 97.8 F | BODY MASS INDEX: 33.04 KG/M2

## 2018-01-01 VITALS
HEIGHT: 71 IN | SYSTOLIC BLOOD PRESSURE: 97 MMHG | WEIGHT: 231.4 LBS | TEMPERATURE: 97.7 F | DIASTOLIC BLOOD PRESSURE: 65 MMHG | BODY MASS INDEX: 32.4 KG/M2 | OXYGEN SATURATION: 95 % | HEART RATE: 91 BPM | RESPIRATION RATE: 18 BRPM

## 2018-01-01 VITALS — BODY MASS INDEX: 33.04 KG/M2 | HEIGHT: 71 IN | WEIGHT: 236 LBS

## 2018-01-01 VITALS
WEIGHT: 235.8 LBS | SYSTOLIC BLOOD PRESSURE: 123 MMHG | DIASTOLIC BLOOD PRESSURE: 65 MMHG | BODY MASS INDEX: 33.01 KG/M2 | HEIGHT: 71 IN | HEART RATE: 56 BPM | TEMPERATURE: 97.2 F | OXYGEN SATURATION: 97 %

## 2018-01-01 VITALS — WEIGHT: 234.13 LBS | HEIGHT: 71 IN | BODY MASS INDEX: 32.78 KG/M2

## 2018-01-01 VITALS
OXYGEN SATURATION: 95 % | SYSTOLIC BLOOD PRESSURE: 113 MMHG | HEIGHT: 71 IN | BODY MASS INDEX: 32.93 KG/M2 | HEART RATE: 64 BPM | WEIGHT: 235.23 LBS | DIASTOLIC BLOOD PRESSURE: 77 MMHG | RESPIRATION RATE: 20 BRPM | TEMPERATURE: 97.6 F

## 2018-01-01 VITALS
SYSTOLIC BLOOD PRESSURE: 121 MMHG | TEMPERATURE: 96.9 F | HEIGHT: 71 IN | OXYGEN SATURATION: 96 % | RESPIRATION RATE: 16 BRPM | BODY MASS INDEX: 33.11 KG/M2 | DIASTOLIC BLOOD PRESSURE: 52 MMHG | HEART RATE: 55 BPM | WEIGHT: 236.5 LBS

## 2018-01-01 VITALS
HEART RATE: 75 BPM | DIASTOLIC BLOOD PRESSURE: 86 MMHG | WEIGHT: 240.2 LBS | TEMPERATURE: 97.7 F | OXYGEN SATURATION: 98 % | BODY MASS INDEX: 33.63 KG/M2 | SYSTOLIC BLOOD PRESSURE: 118 MMHG | RESPIRATION RATE: 16 BRPM | HEIGHT: 71 IN

## 2018-01-01 DIAGNOSIS — R06.09 DYSPNEA ON EXERTION: Primary | ICD-10-CM

## 2018-01-01 DIAGNOSIS — I25.10 CORONARY ARTERY DISEASE INVOLVING NATIVE CORONARY ARTERY OF NATIVE HEART WITHOUT ANGINA PECTORIS: ICD-10-CM

## 2018-01-01 DIAGNOSIS — I25.10 CORONARY ARTERY DISEASE INVOLVING NATIVE HEART WITHOUT ANGINA PECTORIS, UNSPECIFIED VESSEL OR LESION TYPE: ICD-10-CM

## 2018-01-01 DIAGNOSIS — I51.9 CARDIAC DISEASE: Primary | ICD-10-CM

## 2018-01-01 DIAGNOSIS — I35.9 AORTIC VALVE DISORDER: ICD-10-CM

## 2018-01-01 DIAGNOSIS — I35.9 AORTIC VALVE DISORDER: Primary | ICD-10-CM

## 2018-01-01 DIAGNOSIS — I35.0 NONRHEUMATIC AORTIC VALVE STENOSIS: ICD-10-CM

## 2018-01-01 DIAGNOSIS — I50.22 CHRONIC SYSTOLIC HEART FAILURE (HCC): ICD-10-CM

## 2018-01-01 DIAGNOSIS — I48.0 PAROXYSMAL ATRIAL FIBRILLATION (HCC): ICD-10-CM

## 2018-01-01 DIAGNOSIS — Z79.4 TYPE 2 DIABETES MELLITUS WITH OTHER NEUROLOGIC COMPLICATION, WITH LONG-TERM CURRENT USE OF INSULIN (HCC): ICD-10-CM

## 2018-01-01 DIAGNOSIS — R06.09 DYSPNEA ON EXERTION: ICD-10-CM

## 2018-01-01 DIAGNOSIS — E11.49 TYPE 2 DIABETES MELLITUS WITH OTHER NEUROLOGIC COMPLICATION, WITH LONG-TERM CURRENT USE OF INSULIN (HCC): ICD-10-CM

## 2018-01-01 DIAGNOSIS — I51.9 CARDIAC DISEASE: ICD-10-CM

## 2018-01-01 DIAGNOSIS — I35.0 NONRHEUMATIC AORTIC VALVE STENOSIS: Primary | ICD-10-CM

## 2018-01-01 DIAGNOSIS — J43.2 CENTRILOBULAR EMPHYSEMA (HCC): ICD-10-CM

## 2018-01-01 DIAGNOSIS — I63.9 CEREBROVASCULAR ACCIDENT (CVA), UNSPECIFIED MECHANISM (HCC): Primary | ICD-10-CM

## 2018-01-01 DIAGNOSIS — I25.10 CORONARY ARTERY DISEASE INVOLVING NATIVE CORONARY ARTERY OF NATIVE HEART, ANGINA PRESENCE UNSPECIFIED: ICD-10-CM

## 2018-01-01 DIAGNOSIS — I63.9 CEREBROVASCULAR ACCIDENT (CVA), UNSPECIFIED MECHANISM (HCC): ICD-10-CM

## 2018-01-01 DIAGNOSIS — R55 NEAR SYNCOPE: ICD-10-CM

## 2018-01-01 DIAGNOSIS — I35.0 AORTIC VALVE STENOSIS, ETIOLOGY OF CARDIAC VALVE DISEASE UNSPECIFIED: ICD-10-CM

## 2018-01-01 DIAGNOSIS — N18.30 CKD (CHRONIC KIDNEY DISEASE) STAGE 3, GFR 30-59 ML/MIN (HCC): ICD-10-CM

## 2018-01-01 DIAGNOSIS — I50.22 CHRONIC SYSTOLIC CONGESTIVE HEART FAILURE (HCC): ICD-10-CM

## 2018-01-01 DIAGNOSIS — Z74.09 IMPAIRED FUNCTIONAL MOBILITY, BALANCE, GAIT, AND ENDURANCE: Primary | ICD-10-CM

## 2018-01-01 DIAGNOSIS — I10 ESSENTIAL HYPERTENSION: ICD-10-CM

## 2018-01-01 DIAGNOSIS — I35.0 AORTIC VALVE STENOSIS, ETIOLOGY OF CARDIAC VALVE DISEASE UNSPECIFIED: Primary | ICD-10-CM

## 2018-01-01 LAB
ABO + RH BLD: NORMAL
ABO GROUP BLD: NORMAL
ACT BLD: 120 SECONDS (ref 82–152)
ACT BLD: 136 SECONDS (ref 82–152)
ACT BLD: 158 SECONDS (ref 82–152)
ACT BLD: 164 SECONDS (ref 82–152)
ACT BLD: 400 SECONDS (ref 82–152)
ALBUMIN SERPL-MCNC: 3.4 G/DL (ref 3.2–4.8)
ALBUMIN SERPL-MCNC: 3.6 G/DL (ref 3.2–4.8)
ALBUMIN SERPL-MCNC: 4.3 G/DL (ref 3.2–4.8)
ALBUMIN SERPL-MCNC: 4.6 G/DL (ref 3.2–4.8)
ALBUMIN/GLOB SERPL: 1.3 G/DL (ref 1.5–2.5)
ALBUMIN/GLOB SERPL: 1.3 G/DL (ref 1.5–2.5)
ALP SERPL-CCNC: 72 U/L (ref 25–100)
ALP SERPL-CCNC: 75 U/L (ref 25–100)
ALT SERPL W P-5'-P-CCNC: 14 U/L (ref 7–40)
ALT SERPL W P-5'-P-CCNC: 17 U/L (ref 7–40)
AMPHET+METHAMPHET UR QL: NEGATIVE
AMPHETAMINES UR QL: NEGATIVE
ANION GAP SERPL CALCULATED.3IONS-SCNC: 0 MMOL/L (ref 3–11)
ANION GAP SERPL CALCULATED.3IONS-SCNC: 2 MMOL/L (ref 3–11)
ANION GAP SERPL CALCULATED.3IONS-SCNC: 3 MMOL/L (ref 3–11)
ANION GAP SERPL CALCULATED.3IONS-SCNC: 5 MMOL/L (ref 3–11)
ANION GAP SERPL CALCULATED.3IONS-SCNC: 5 MMOL/L (ref 3–11)
ANION GAP SERPL CALCULATED.3IONS-SCNC: 6 MMOL/L (ref 3–11)
ANION GAP SERPL CALCULATED.3IONS-SCNC: 7 MMOL/L (ref 3–11)
ANION GAP SERPL CALCULATED.3IONS-SCNC: 9 MMOL/L (ref 3–11)
ANTI-S: NORMAL
APTT PPP: 28.5 SECONDS (ref 24–31)
APTT PPP: 43.3 SECONDS (ref 24–31)
ARTICHOKE IGE QN: 59 MG/DL (ref 0–130)
AST SERPL-CCNC: 20 U/L (ref 0–33)
AST SERPL-CCNC: 21 U/L (ref 0–33)
BARBITURATES UR QL SCN: NEGATIVE
BASE EXCESS BLDA CALC-SCNC: 1 MMOL/L (ref -5–5)
BASE EXCESS BLDA CALC-SCNC: 3 MMOL/L (ref -5–5)
BASOPHILS # BLD AUTO: 0.03 10*3/MM3 (ref 0–0.2)
BASOPHILS # BLD AUTO: 0.05 10*3/MM3 (ref 0–0.2)
BASOPHILS NFR BLD AUTO: 0.8 % (ref 0–1)
BASOPHILS NFR BLD AUTO: 1 % (ref 0–1)
BENZODIAZ UR QL SCN: NEGATIVE
BH BB BLOOD EXPIRATION DATE: NORMAL
BH BB BLOOD TYPE BARCODE: 5100
BH BB BLOOD TYPE BARCODE: 6200
BH BB DISPENSE STATUS: NORMAL
BH BB PRODUCT CODE: NORMAL
BH BB UNIT NUMBER: NORMAL
BH CV ECHO MEAS - AO MAX PG (FULL): 29.5 MMHG
BH CV ECHO MEAS - AO MAX PG (FULL): 41.8 MMHG
BH CV ECHO MEAS - AO MAX PG (FULL): 67.5 MMHG
BH CV ECHO MEAS - AO MAX PG: 31 MMHG
BH CV ECHO MEAS - AO MAX PG: 44 MMHG
BH CV ECHO MEAS - AO MAX PG: 71.9 MMHG
BH CV ECHO MEAS - AO MEAN PG (FULL): 17.3 MMHG
BH CV ECHO MEAS - AO MEAN PG (FULL): 23.1 MMHG
BH CV ECHO MEAS - AO MEAN PG (FULL): 28 MMHG
BH CV ECHO MEAS - AO MEAN PG: 18 MMHG
BH CV ECHO MEAS - AO MEAN PG: 24.1 MMHG
BH CV ECHO MEAS - AO MEAN PG: 30 MMHG
BH CV ECHO MEAS - AO ROOT AREA (BSA CORRECTED): 1.7
BH CV ECHO MEAS - AO ROOT AREA: 11.9 CM^2
BH CV ECHO MEAS - AO ROOT DIAM: 3.9 CM
BH CV ECHO MEAS - AO V2 MAX: 277 CM/SEC
BH CV ECHO MEAS - AO V2 MAX: 328.7 CM/SEC
BH CV ECHO MEAS - AO V2 MAX: 424 CM/SEC
BH CV ECHO MEAS - AO V2 MEAN: 200 CM/SEC
BH CV ECHO MEAS - AO V2 MEAN: 224.7 CM/SEC
BH CV ECHO MEAS - AO V2 MEAN: 250 CM/SEC
BH CV ECHO MEAS - AO V2 VTI: 105 CM
BH CV ECHO MEAS - AO V2 VTI: 56.4 CM
BH CV ECHO MEAS - AO V2 VTI: 74.5 CM
BH CV ECHO MEAS - AVA(I,A): 0.59 CM^2
BH CV ECHO MEAS - AVA(I,A): 0.65 CM^2
BH CV ECHO MEAS - AVA(I,A): 0.7 CM^2
BH CV ECHO MEAS - AVA(I,D): 0.59 CM^2
BH CV ECHO MEAS - AVA(I,D): 0.65 CM^2
BH CV ECHO MEAS - AVA(I,D): 0.7 CM^2
BH CV ECHO MEAS - AVA(V,A): 0.69 CM^2
BH CV ECHO MEAS - AVA(V,A): 0.75 CM^2
BH CV ECHO MEAS - AVA(V,A): 0.78 CM^2
BH CV ECHO MEAS - AVA(V,D): 0.69 CM^2
BH CV ECHO MEAS - AVA(V,D): 0.75 CM^2
BH CV ECHO MEAS - AVA(V,D): 0.78 CM^2
BH CV ECHO MEAS - BSA(HAYCOCK): 2.3 M^2
BH CV ECHO MEAS - BSA: 2.3 M^2
BH CV ECHO MEAS - BZI_BMI: 32.8 KILOGRAMS/M^2
BH CV ECHO MEAS - BZI_BMI: 32.9 KILOGRAMS/M^2
BH CV ECHO MEAS - BZI_BMI: 32.9 KILOGRAMS/M^2
BH CV ECHO MEAS - BZI_METRIC_HEIGHT: 180.3 CM
BH CV ECHO MEAS - BZI_METRIC_WEIGHT: 106.6 KG
BH CV ECHO MEAS - BZI_METRIC_WEIGHT: 107.1 KG
BH CV ECHO MEAS - BZI_METRIC_WEIGHT: 107.1 KG
BH CV ECHO MEAS - CONTRAST EF (2CH): 17.1 ML/M^2
BH CV ECHO MEAS - CONTRAST EF 4CH: 36 ML/M^2
BH CV ECHO MEAS - EDV(CUBED): 91.7 ML
BH CV ECHO MEAS - EDV(MOD-SP2): 129 ML
BH CV ECHO MEAS - EDV(MOD-SP4): 164 ML
BH CV ECHO MEAS - EDV(TEICH): 92.9 ML
BH CV ECHO MEAS - EF(CUBED): 38.3 %
BH CV ECHO MEAS - EF(MOD-SP2): 17.1 %
BH CV ECHO MEAS - EF(TEICH): 31.6 %
BH CV ECHO MEAS - ESV(CUBED): 56.6 ML
BH CV ECHO MEAS - ESV(MOD-SP2): 107 ML
BH CV ECHO MEAS - ESV(MOD-SP4): 105 ML
BH CV ECHO MEAS - ESV(TEICH): 63.5 ML
BH CV ECHO MEAS - FS: 14.9 %
BH CV ECHO MEAS - IVS/LVPW: 0.99
BH CV ECHO MEAS - IVSD: 1.2 CM
BH CV ECHO MEAS - LA DIMENSION: 4.4 CM
BH CV ECHO MEAS - LA/AO: 1.1
BH CV ECHO MEAS - LAT PEAK E' VEL: 10.7 CM/SEC
BH CV ECHO MEAS - LV DIASTOLIC VOL/BSA (35-75): 72.5 ML/M^2
BH CV ECHO MEAS - LV MASS(C)D: 190.5 GRAMS
BH CV ECHO MEAS - LV MASS(C)DI: 84.2 GRAMS/M^2
BH CV ECHO MEAS - LV MAX PG: 1.5 MMHG
BH CV ECHO MEAS - LV MAX PG: 2.2 MMHG
BH CV ECHO MEAS - LV MAX PG: 4.4 MMHG
BH CV ECHO MEAS - LV MEAN PG: 0.67 MMHG
BH CV ECHO MEAS - LV MEAN PG: 1.1 MMHG
BH CV ECHO MEAS - LV MEAN PG: 2 MMHG
BH CV ECHO MEAS - LV SYSTOLIC VOL/BSA (12-30): 46.4 ML/M^2
BH CV ECHO MEAS - LV V1 MAX: 105 CM/SEC
BH CV ECHO MEAS - LV V1 MAX: 61 CM/SEC
BH CV ECHO MEAS - LV V1 MAX: 75 CM/SEC
BH CV ECHO MEAS - LV V1 MEAN: 42.2 CM/SEC
BH CV ECHO MEAS - LV V1 MEAN: 47.4 CM/SEC
BH CV ECHO MEAS - LV V1 MEAN: 65 CM/SEC
BH CV ECHO MEAS - LV V1 VTI: 12.5 CM
BH CV ECHO MEAS - LV V1 VTI: 14.7 CM
BH CV ECHO MEAS - LV V1 VTI: 19.8 CM
BH CV ECHO MEAS - LVIDD: 4.5 CM
BH CV ECHO MEAS - LVIDS: 3.8 CM
BH CV ECHO MEAS - LVLD AP2: 8.1 CM
BH CV ECHO MEAS - LVLD AP4: 8.8 CM
BH CV ECHO MEAS - LVLS AP2: 7.3 CM
BH CV ECHO MEAS - LVLS AP4: 7.3 CM
BH CV ECHO MEAS - LVOT AREA (M): 3.1 CM^2
BH CV ECHO MEAS - LVOT AREA: 3.1 CM^2
BH CV ECHO MEAS - LVOT AREA: 3.1 CM^2
BH CV ECHO MEAS - LVOT AREA: 3.3 CM^2
BH CV ECHO MEAS - LVOT DIAM: 2 CM
BH CV ECHO MEAS - LVPWD: 1.2 CM
BH CV ECHO MEAS - MED PEAK E' VEL: 6.69 CM/SEC
BH CV ECHO MEAS - MV A MAX VEL: 36.5 CM/SEC
BH CV ECHO MEAS - MV E MAX VEL: 109 CM/SEC
BH CV ECHO MEAS - MV E/A: 3
BH CV ECHO MEAS - MV MAX PG: 3.4 MMHG
BH CV ECHO MEAS - MV MEAN PG: 1 MMHG
BH CV ECHO MEAS - MV V2 MAX: 92.3 CM/SEC
BH CV ECHO MEAS - MV V2 MEAN: 47.3 CM/SEC
BH CV ECHO MEAS - MV V2 VTI: 22.2 CM
BH CV ECHO MEAS - MVA(VTI): 1.8 CM^2
BH CV ECHO MEAS - RAP SYSTOLE: 8 MMHG
BH CV ECHO MEAS - RVDD: 2.7 CM
BH CV ECHO MEAS - RVSP: 37 MMHG
BH CV ECHO MEAS - SI(AO): 554.4 ML/M^2
BH CV ECHO MEAS - SI(CUBED): 15.5 ML/M^2
BH CV ECHO MEAS - SI(LVOT): 21.3 ML/M^2
BH CV ECHO MEAS - SI(LVOT): 27.5 ML/M^2
BH CV ECHO MEAS - SI(MOD-SP2): 9.7 ML/M^2
BH CV ECHO MEAS - SI(MOD-SP4): 26.1 ML/M^2
BH CV ECHO MEAS - SI(TEICH): 13 ML/M^2
BH CV ECHO MEAS - SV(AO): 1254 ML
BH CV ECHO MEAS - SV(CUBED): 35.1 ML
BH CV ECHO MEAS - SV(LVOT): 39.3 ML
BH CV ECHO MEAS - SV(LVOT): 48.1 ML
BH CV ECHO MEAS - SV(LVOT): 62.2 ML
BH CV ECHO MEAS - SV(MOD-SP2): 22 ML
BH CV ECHO MEAS - SV(MOD-SP4): 59 ML
BH CV ECHO MEAS - SV(TEICH): 29.4 ML
BH CV ECHO MEAS - TAPSE (>1.6): 1.6 CM2
BH CV ECHO MEAS - TR MAX VEL: 269.5 CM/SEC
BH CV VAS BP LEFT ARM: NORMAL MMHG
BH CV VAS BP LEFT ARM: NORMAL MMHG
BH CV XLRA - RV BASE: 5.6 CM
BH CV XLRA - RV LENGTH: 7.2 CM
BH CV XLRA - RV MID: 4.3 CM
BH CV XLRA - TDI S': 9.1 CM/SEC
BH CV XLRA MEAS LEFT CCA RATIO VEL: 51.8 CM/SEC
BH CV XLRA MEAS LEFT DIST CCA EDV: 16.5 CM/SEC
BH CV XLRA MEAS LEFT DIST CCA PSV: 59.6 CM/SEC
BH CV XLRA MEAS LEFT DIST ICA EDV: 30 CM/SEC
BH CV XLRA MEAS LEFT DIST ICA PSV: 62.9 CM/SEC
BH CV XLRA MEAS LEFT ICA RATIO VEL: 134 CM/SEC
BH CV XLRA MEAS LEFT ICA/CCA RATIO: 2.6
BH CV XLRA MEAS LEFT MID CCA EDV: 15.4 CM/SEC
BH CV XLRA MEAS LEFT MID CCA PSV: 52.4 CM/SEC
BH CV XLRA MEAS LEFT MID ICA EDV: 49.5 CM/SEC
BH CV XLRA MEAS LEFT MID ICA PSV: 134.4 CM/SEC
BH CV XLRA MEAS LEFT PROX CCA EDV: 22.6 CM/SEC
BH CV XLRA MEAS LEFT PROX CCA PSV: 88.8 CM/SEC
BH CV XLRA MEAS LEFT PROX ECA PSV: 97.6 CM/SEC
BH CV XLRA MEAS LEFT PROX ICA EDV: 22.1 CM/SEC
BH CV XLRA MEAS LEFT PROX ICA PSV: 64.5 CM/SEC
BH CV XLRA MEAS LEFT PROX SCLA PSV: 98.2 CM/SEC
BH CV XLRA MEAS LEFT VERTEBRAL A EDV: 17.2 CM/SEC
BH CV XLRA MEAS LEFT VERTEBRAL A PSV: 42.7 CM/SEC
BH CV XLRA MEAS RIGHT CCA RATIO VEL: 72.7 CM/SEC
BH CV XLRA MEAS RIGHT DIST CCA EDV: 14.7 CM/SEC
BH CV XLRA MEAS RIGHT DIST CCA PSV: 48.6 CM/SEC
BH CV XLRA MEAS RIGHT DIST ICA EDV: 21.3 CM/SEC
BH CV XLRA MEAS RIGHT DIST ICA PSV: 52 CM/SEC
BH CV XLRA MEAS RIGHT ICA RATIO VEL: 75.6 CM/SEC
BH CV XLRA MEAS RIGHT ICA/CCA RATIO: 1
BH CV XLRA MEAS RIGHT MID CCA EDV: 16.7 CM/SEC
BH CV XLRA MEAS RIGHT MID CCA PSV: 73.2 CM/SEC
BH CV XLRA MEAS RIGHT MID ICA EDV: 29 CM/SEC
BH CV XLRA MEAS RIGHT MID ICA PSV: 76.1 CM/SEC
BH CV XLRA MEAS RIGHT PROX CCA EDV: 18.2 CM/SEC
BH CV XLRA MEAS RIGHT PROX CCA PSV: 59.9 CM/SEC
BH CV XLRA MEAS RIGHT PROX ECA PSV: 82.5 CM/SEC
BH CV XLRA MEAS RIGHT PROX ICA EDV: 14.2 CM/SEC
BH CV XLRA MEAS RIGHT PROX ICA PSV: 50.6 CM/SEC
BH CV XLRA MEAS RIGHT PROX SCLA PSV: 117.9 CM/SEC
BH CV XLRA MEAS RIGHT VERTEBRAL A EDV: 19.9 CM/SEC
BH CV XLRA MEAS RIGHT VERTEBRAL A PSV: 52.7 CM/SEC
BILIRUB SERPL-MCNC: 0.4 MG/DL (ref 0.3–1.2)
BILIRUB SERPL-MCNC: 0.5 MG/DL (ref 0.3–1.2)
BLD GP AB SCN SERPL QL: POSITIVE
BUN BLD-MCNC: 13 MG/DL (ref 9–23)
BUN BLD-MCNC: 17 MG/DL (ref 9–23)
BUN BLD-MCNC: 19 MG/DL (ref 9–23)
BUN BLD-MCNC: 19 MG/DL (ref 9–23)
BUN BLD-MCNC: 20 MG/DL (ref 9–23)
BUN BLD-MCNC: 27 MG/DL (ref 9–23)
BUN BLD-MCNC: 31 MG/DL (ref 9–23)
BUN BLD-MCNC: 37 MG/DL (ref 9–23)
BUN/CREAT SERPL: 14.4 (ref 7–25)
BUN/CREAT SERPL: 17 (ref 7–25)
BUN/CREAT SERPL: 18.2 (ref 7–25)
BUN/CREAT SERPL: 19 (ref 7–25)
BUN/CREAT SERPL: 19 (ref 7–25)
BUN/CREAT SERPL: 19.4 (ref 7–25)
BUN/CREAT SERPL: 22.5 (ref 7–25)
BUN/CREAT SERPL: 26.4 (ref 7–25)
BUPRENORPHINE SERPL-MCNC: NEGATIVE NG/ML
CA-I BLDA-SCNC: 1.06 MMOL/L (ref 1.2–1.32)
CA-I BLDA-SCNC: 1.18 MMOL/L (ref 1.2–1.32)
CA-I SERPL ISE-MCNC: 1.3 MMOL/L (ref 1.12–1.32)
CALCIUM SPEC-SCNC: 8.5 MG/DL (ref 8.7–10.4)
CALCIUM SPEC-SCNC: 8.7 MG/DL (ref 8.7–10.4)
CALCIUM SPEC-SCNC: 8.8 MG/DL (ref 8.7–10.4)
CALCIUM SPEC-SCNC: 9 MG/DL (ref 8.7–10.4)
CALCIUM SPEC-SCNC: 9.1 MG/DL (ref 8.7–10.4)
CALCIUM SPEC-SCNC: 9.1 MG/DL (ref 8.7–10.4)
CALCIUM SPEC-SCNC: 9.3 MG/DL (ref 8.7–10.4)
CALCIUM SPEC-SCNC: 9.9 MG/DL (ref 8.7–10.4)
CANNABINOIDS SERPL QL: NEGATIVE
CHLORIDE SERPL-SCNC: 104 MMOL/L (ref 99–109)
CHLORIDE SERPL-SCNC: 105 MMOL/L (ref 99–109)
CHLORIDE SERPL-SCNC: 107 MMOL/L (ref 99–109)
CHLORIDE SERPL-SCNC: 107 MMOL/L (ref 99–109)
CHLORIDE SERPL-SCNC: 109 MMOL/L (ref 99–109)
CHLORIDE SERPL-SCNC: 111 MMOL/L (ref 99–109)
CHLORIDE SERPL-SCNC: 96 MMOL/L (ref 99–109)
CHLORIDE SERPL-SCNC: 98 MMOL/L (ref 99–109)
CHOLEST SERPL-MCNC: 127 MG/DL (ref 0–200)
CO2 BLDA-SCNC: 27 MMOL/L (ref 24–29)
CO2 BLDA-SCNC: 29 MMOL/L (ref 24–29)
CO2 SERPL-SCNC: 24 MMOL/L (ref 20–31)
CO2 SERPL-SCNC: 25 MMOL/L (ref 20–31)
CO2 SERPL-SCNC: 27 MMOL/L (ref 20–31)
CO2 SERPL-SCNC: 27 MMOL/L (ref 20–31)
CO2 SERPL-SCNC: 30 MMOL/L (ref 20–31)
CO2 SERPL-SCNC: 31 MMOL/L (ref 20–31)
COCAINE UR QL: NEGATIVE
CREAT BLD-MCNC: 0.9 MG/DL (ref 0.6–1.3)
CREAT BLD-MCNC: 1 MG/DL (ref 0.6–1.3)
CREAT BLD-MCNC: 1.1 MG/DL (ref 0.6–1.3)
CREAT BLD-MCNC: 1.2 MG/DL (ref 0.6–1.3)
CREAT BLD-MCNC: 1.4 MG/DL (ref 0.6–1.3)
CREAT BLD-MCNC: 1.6 MG/DL (ref 0.6–1.3)
CREAT BLDA-MCNC: 1.2 MG/DL (ref 0.6–1.3)
CROSSMATCH INTERPRETATION: NORMAL
DEPRECATED RDW RBC AUTO: 51.4 FL (ref 37–54)
DEPRECATED RDW RBC AUTO: 52 FL (ref 37–54)
DEPRECATED RDW RBC AUTO: 52.3 FL (ref 37–54)
DEPRECATED RDW RBC AUTO: 52.5 FL (ref 37–54)
DEPRECATED RDW RBC AUTO: 53.5 FL (ref 37–54)
DEPRECATED RDW RBC AUTO: 53.8 FL (ref 37–54)
E/E' RATIO: 13
EOSINOPHIL # BLD AUTO: 0 10*3/MM3 (ref 0–0.3)
EOSINOPHIL # BLD AUTO: 0.01 10*3/MM3 (ref 0–0.3)
EOSINOPHIL NFR BLD AUTO: 0 % (ref 0–3)
EOSINOPHIL NFR BLD AUTO: 0.3 % (ref 0–3)
ERYTHROCYTE [DISTWIDTH] IN BLOOD BY AUTOMATED COUNT: 15.3 % (ref 11.3–14.5)
ERYTHROCYTE [DISTWIDTH] IN BLOOD BY AUTOMATED COUNT: 15.6 % (ref 11.3–14.5)
ERYTHROCYTE [DISTWIDTH] IN BLOOD BY AUTOMATED COUNT: 15.7 % (ref 11.3–14.5)
ERYTHROCYTE [DISTWIDTH] IN BLOOD BY AUTOMATED COUNT: 16.3 % (ref 11.3–14.5)
GFR SERPL CREATININE-BSD FRML MDRD: 43 ML/MIN/1.73
GFR SERPL CREATININE-BSD FRML MDRD: 51 ML/MIN/1.73
GFR SERPL CREATININE-BSD FRML MDRD: 60 ML/MIN/1.73
GFR SERPL CREATININE-BSD FRML MDRD: 67 ML/MIN/1.73
GFR SERPL CREATININE-BSD FRML MDRD: 75 ML/MIN/1.73
GFR SERPL CREATININE-BSD FRML MDRD: 84 ML/MIN/1.73
GLOBULIN UR ELPH-MCNC: 3.3 GM/DL
GLOBULIN UR ELPH-MCNC: 3.5 GM/DL
GLUCOSE BLD-MCNC: 190 MG/DL (ref 70–100)
GLUCOSE BLD-MCNC: 194 MG/DL (ref 70–100)
GLUCOSE BLD-MCNC: 195 MG/DL (ref 70–100)
GLUCOSE BLD-MCNC: 234 MG/DL (ref 70–100)
GLUCOSE BLD-MCNC: 282 MG/DL (ref 70–100)
GLUCOSE BLD-MCNC: 301 MG/DL (ref 70–100)
GLUCOSE BLD-MCNC: 360 MG/DL (ref 70–100)
GLUCOSE BLD-MCNC: 379 MG/DL (ref 70–100)
GLUCOSE BLDC GLUCOMTR-MCNC: 121 MG/DL (ref 70–130)
GLUCOSE BLDC GLUCOMTR-MCNC: 137 MG/DL (ref 70–130)
GLUCOSE BLDC GLUCOMTR-MCNC: 138 MG/DL (ref 70–130)
GLUCOSE BLDC GLUCOMTR-MCNC: 148 MG/DL (ref 70–130)
GLUCOSE BLDC GLUCOMTR-MCNC: 151 MG/DL (ref 70–130)
GLUCOSE BLDC GLUCOMTR-MCNC: 152 MG/DL (ref 70–130)
GLUCOSE BLDC GLUCOMTR-MCNC: 154 MG/DL (ref 70–130)
GLUCOSE BLDC GLUCOMTR-MCNC: 160 MG/DL (ref 70–130)
GLUCOSE BLDC GLUCOMTR-MCNC: 175 MG/DL (ref 70–130)
GLUCOSE BLDC GLUCOMTR-MCNC: 182 MG/DL (ref 70–130)
GLUCOSE BLDC GLUCOMTR-MCNC: 184 MG/DL (ref 70–130)
GLUCOSE BLDC GLUCOMTR-MCNC: 191 MG/DL (ref 70–130)
GLUCOSE BLDC GLUCOMTR-MCNC: 193 MG/DL (ref 70–130)
GLUCOSE BLDC GLUCOMTR-MCNC: 194 MG/DL (ref 70–130)
GLUCOSE BLDC GLUCOMTR-MCNC: 198 MG/DL (ref 70–130)
GLUCOSE BLDC GLUCOMTR-MCNC: 200 MG/DL (ref 70–130)
GLUCOSE BLDC GLUCOMTR-MCNC: 202 MG/DL (ref 70–130)
GLUCOSE BLDC GLUCOMTR-MCNC: 202 MG/DL (ref 70–130)
GLUCOSE BLDC GLUCOMTR-MCNC: 209 MG/DL (ref 70–130)
GLUCOSE BLDC GLUCOMTR-MCNC: 210 MG/DL (ref 70–130)
GLUCOSE BLDC GLUCOMTR-MCNC: 211 MG/DL (ref 70–130)
GLUCOSE BLDC GLUCOMTR-MCNC: 216 MG/DL (ref 70–130)
GLUCOSE BLDC GLUCOMTR-MCNC: 224 MG/DL (ref 70–130)
GLUCOSE BLDC GLUCOMTR-MCNC: 232 MG/DL (ref 70–130)
GLUCOSE BLDC GLUCOMTR-MCNC: 238 MG/DL (ref 70–130)
GLUCOSE BLDC GLUCOMTR-MCNC: 259 MG/DL (ref 70–130)
GLUCOSE BLDC GLUCOMTR-MCNC: 262 MG/DL (ref 70–130)
GLUCOSE BLDC GLUCOMTR-MCNC: 292 MG/DL (ref 70–130)
GLUCOSE BLDC GLUCOMTR-MCNC: 295 MG/DL (ref 70–130)
GLUCOSE BLDC GLUCOMTR-MCNC: 323 MG/DL (ref 70–130)
GLUCOSE BLDC GLUCOMTR-MCNC: 330 MG/DL (ref 70–130)
GLUCOSE BLDC GLUCOMTR-MCNC: 92 MG/DL (ref 70–130)
HBA1C MFR BLD: 9.2 % (ref 4.8–5.6)
HBA1C MFR BLD: 9.6 % (ref 4.8–5.6)
HCO3 BLDA-SCNC: 25.5 MMOL/L (ref 22–26)
HCO3 BLDA-SCNC: 27.8 MMOL/L (ref 22–26)
HCT VFR BLD AUTO: 31.3 % (ref 38.9–50.9)
HCT VFR BLD AUTO: 32.1 % (ref 38.9–50.9)
HCT VFR BLD AUTO: 35.5 % (ref 38.9–50.9)
HCT VFR BLD AUTO: 36.3 % (ref 38.9–50.9)
HCT VFR BLD AUTO: 42.1 % (ref 38.9–50.9)
HCT VFR BLD AUTO: 42.9 % (ref 38.9–50.9)
HCT VFR BLDA CALC: 34 % (ref 38–51)
HCT VFR BLDA CALC: 35 % (ref 38–51)
HDLC SERPL-MCNC: 32 MG/DL (ref 40–60)
HGB BLD-MCNC: 10.4 G/DL (ref 13.1–17.5)
HGB BLD-MCNC: 10.5 G/DL (ref 13.1–17.5)
HGB BLD-MCNC: 11.8 G/DL (ref 13.1–17.5)
HGB BLD-MCNC: 11.9 G/DL (ref 13.1–17.5)
HGB BLD-MCNC: 14.2 G/DL (ref 13.1–17.5)
HGB BLD-MCNC: 14.7 G/DL (ref 13.1–17.5)
HGB BLDA-MCNC: 11.6 G/DL (ref 12–17)
HGB BLDA-MCNC: 11.9 G/DL (ref 12–17)
IMM GRANULOCYTES # BLD: 0 10*3/MM3 (ref 0–0.03)
IMM GRANULOCYTES # BLD: 0.04 10*3/MM3 (ref 0–0.03)
IMM GRANULOCYTES NFR BLD: 0 % (ref 0–0.6)
IMM GRANULOCYTES NFR BLD: 0.8 % (ref 0–0.6)
INR PPP: 1.03 (ref 0.91–1.09)
INR PPP: 1.06 (ref 0.91–1.09)
INR PPP: 1.14 (ref 0.91–1.09)
LEFT ARM BP: NORMAL MMHG
LEFT ATRIUM VOLUME INDEX: 48.7 ML/M2
LV EF 2D ECHO EST: 25 %
LV EF 2D ECHO EST: 35 %
LV EF 2D ECHO EST: 45 %
LYMPHOCYTES # BLD AUTO: 0.47 10*3/MM3 (ref 0.6–4.8)
LYMPHOCYTES # BLD AUTO: 1.58 10*3/MM3 (ref 0.6–4.8)
LYMPHOCYTES NFR BLD AUTO: 12.6 % (ref 24–44)
LYMPHOCYTES NFR BLD AUTO: 31.4 % (ref 24–44)
MAGNESIUM SERPL-MCNC: 1.8 MG/DL (ref 1.3–2.7)
MAGNESIUM SERPL-MCNC: 1.9 MG/DL (ref 1.3–2.7)
MAGNESIUM SERPL-MCNC: 2 MG/DL (ref 1.3–2.7)
MAXIMAL PREDICTED HEART RATE: 153 BPM
MCH RBC QN AUTO: 30.5 PG (ref 27–31)
MCH RBC QN AUTO: 30.7 PG (ref 27–31)
MCH RBC QN AUTO: 30.8 PG (ref 27–31)
MCH RBC QN AUTO: 31.1 PG (ref 27–31)
MCH RBC QN AUTO: 31.1 PG (ref 27–31)
MCH RBC QN AUTO: 31.6 PG (ref 27–31)
MCHC RBC AUTO-ENTMCNC: 32.7 G/DL (ref 32–36)
MCHC RBC AUTO-ENTMCNC: 32.8 G/DL (ref 32–36)
MCHC RBC AUTO-ENTMCNC: 33.2 G/DL (ref 32–36)
MCHC RBC AUTO-ENTMCNC: 33.2 G/DL (ref 32–36)
MCHC RBC AUTO-ENTMCNC: 33.7 G/DL (ref 32–36)
MCHC RBC AUTO-ENTMCNC: 34.3 G/DL (ref 32–36)
MCV RBC AUTO: 90.7 FL (ref 80–99)
MCV RBC AUTO: 92.3 FL (ref 80–99)
MCV RBC AUTO: 92.7 FL (ref 80–99)
MCV RBC AUTO: 93.1 FL (ref 80–99)
MCV RBC AUTO: 93.6 FL (ref 80–99)
MCV RBC AUTO: 95 FL (ref 80–99)
METHADONE UR QL SCN: NEGATIVE
MONOCYTES # BLD AUTO: 0.41 10*3/MM3 (ref 0–1)
MONOCYTES # BLD AUTO: 0.58 10*3/MM3 (ref 0–1)
MONOCYTES NFR BLD AUTO: 11 % (ref 0–12)
MONOCYTES NFR BLD AUTO: 11.5 % (ref 0–12)
NEUTROPHILS # BLD AUTO: 2.78 10*3/MM3 (ref 1.5–8.3)
NEUTROPHILS # BLD AUTO: 2.8 10*3/MM3 (ref 1.5–8.3)
NEUTROPHILS NFR BLD AUTO: 55.3 % (ref 41–71)
NEUTROPHILS NFR BLD AUTO: 75.3 % (ref 41–71)
OPIATES UR QL: POSITIVE
OXYCODONE UR QL SCN: NEGATIVE
PA ADP PRP-ACNC: 123 PRU
PCO2 BLDA: 38.9 MM HG (ref 35–45)
PCO2 BLDA: 42.4 MM HG (ref 35–45)
PCP UR QL SCN: NEGATIVE
PH BLDA: 7.42 PH UNITS (ref 7.35–7.6)
PH BLDA: 7.42 PH UNITS (ref 7.35–7.6)
PHOSPHATE SERPL-MCNC: 2 MG/DL (ref 2.4–5.1)
PHOSPHATE SERPL-MCNC: 3.3 MG/DL (ref 2.4–5.1)
PLAT MORPH BLD: NORMAL
PLATELET # BLD AUTO: 115 10*3/MM3 (ref 150–450)
PLATELET # BLD AUTO: 123 10*3/MM3 (ref 150–450)
PLATELET # BLD AUTO: 185 10*3/MM3 (ref 150–450)
PLATELET # BLD AUTO: 76 10*3/MM3 (ref 150–450)
PLATELET # BLD AUTO: 85 10*3/MM3 (ref 150–450)
PLATELET # BLD AUTO: 99 10*3/MM3 (ref 150–450)
PMV BLD AUTO: 10.1 FL (ref 6–12)
PMV BLD AUTO: 10.1 FL (ref 6–12)
PMV BLD AUTO: 10.3 FL (ref 6–12)
PMV BLD AUTO: 10.3 FL (ref 6–12)
PMV BLD AUTO: 10.4 FL (ref 6–12)
PMV BLD AUTO: 10.8 FL (ref 6–12)
PO2 BLDA: 455 MMHG (ref 80–105)
PO2 BLDA: 79 MMHG (ref 80–105)
POTASSIUM BLD-SCNC: 3.9 MMOL/L (ref 3.5–5.5)
POTASSIUM BLD-SCNC: 4 MMOL/L (ref 3.5–5.5)
POTASSIUM BLD-SCNC: 4.4 MMOL/L (ref 3.5–5.5)
POTASSIUM BLD-SCNC: 4.6 MMOL/L (ref 3.5–5.5)
POTASSIUM BLD-SCNC: 4.7 MMOL/L (ref 3.5–5.5)
POTASSIUM BLD-SCNC: 5 MMOL/L (ref 3.5–5.5)
POTASSIUM BLDA-SCNC: 4.3 MMOL/L (ref 3.5–4.9)
POTASSIUM BLDA-SCNC: 4.3 MMOL/L (ref 3.5–4.9)
PROPOXYPH UR QL: NEGATIVE
PROT SERPL-MCNC: 7.6 G/DL (ref 5.7–8.2)
PROT SERPL-MCNC: 8.1 G/DL (ref 5.7–8.2)
PROTHROMBIN TIME: 10.8 SECONDS (ref 9.6–11.5)
PROTHROMBIN TIME: 11.1 SECONDS (ref 9.6–11.5)
PROTHROMBIN TIME: 12 SECONDS (ref 9.6–11.5)
RBC # BLD AUTO: 3.38 10*6/MM3 (ref 4.2–5.76)
RBC # BLD AUTO: 3.39 10*6/MM3 (ref 4.2–5.76)
RBC # BLD AUTO: 3.83 10*6/MM3 (ref 4.2–5.76)
RBC # BLD AUTO: 3.9 10*6/MM3 (ref 4.2–5.76)
RBC # BLD AUTO: 4.5 10*6/MM3 (ref 4.2–5.76)
RBC # BLD AUTO: 4.73 10*6/MM3 (ref 4.2–5.76)
RBC MORPH BLD: NORMAL
RH BLD: POSITIVE
RIGHT ARM BP: NORMAL MMHG
SAO2 % BLDA: 100 % (ref 95–98)
SAO2 % BLDA: 96 % (ref 95–98)
SODIUM BLD-SCNC: 132 MMOL/L (ref 132–146)
SODIUM BLD-SCNC: 132 MMOL/L (ref 132–146)
SODIUM BLD-SCNC: 135 MMOL/L (ref 132–146)
SODIUM BLD-SCNC: 136 MMOL/L (ref 132–146)
SODIUM BLD-SCNC: 136 MMOL/L (ref 132–146)
SODIUM BLD-SCNC: 137 MMOL/L (ref 132–146)
SODIUM BLD-SCNC: 138 MMOL/L (ref 132–146)
SODIUM BLD-SCNC: 140 MMOL/L (ref 132–146)
SODIUM BLDA-SCNC: 136 MMOL/L (ref 138–146)
SODIUM BLDA-SCNC: 142 MMOL/L (ref 138–146)
STRESS TARGET HR: 130 BPM
TRICYCLICS UR QL SCN: POSITIVE
TRIGL SERPL-MCNC: 473 MG/DL (ref 0–150)
UNIT  ABO: NORMAL
UNIT  RH: NORMAL
WBC MORPH BLD: NORMAL
WBC NRBC COR # BLD: 3.72 10*3/MM3 (ref 3.5–10.8)
WBC NRBC COR # BLD: 4.74 10*3/MM3 (ref 3.5–10.8)
WBC NRBC COR # BLD: 5.03 10*3/MM3 (ref 3.5–10.8)
WBC NRBC COR # BLD: 5.07 10*3/MM3 (ref 3.5–10.8)
WBC NRBC COR # BLD: 6.23 10*3/MM3 (ref 3.5–10.8)
WBC NRBC COR # BLD: 8.22 10*3/MM3 (ref 3.5–10.8)

## 2018-01-01 PROCEDURE — C1769 GUIDE WIRE: HCPCS | Performed by: THORACIC SURGERY (CARDIOTHORACIC VASCULAR SURGERY)

## 2018-01-01 PROCEDURE — 83036 HEMOGLOBIN GLYCOSYLATED A1C: CPT | Performed by: PHYSICIAN ASSISTANT

## 2018-01-01 PROCEDURE — C1894 INTRO/SHEATH, NON-LASER: HCPCS | Performed by: THORACIC SURGERY (CARDIOTHORACIC VASCULAR SURGERY)

## 2018-01-01 PROCEDURE — 0 IOPAMIDOL PER 1 ML: Performed by: THORACIC SURGERY (CARDIOTHORACIC VASCULAR SURGERY)

## 2018-01-01 PROCEDURE — 93010 ELECTROCARDIOGRAM REPORT: CPT | Performed by: INTERNAL MEDICINE

## 2018-01-01 PROCEDURE — 82330 ASSAY OF CALCIUM: CPT | Performed by: PHYSICIAN ASSISTANT

## 2018-01-01 PROCEDURE — C1769 GUIDE WIRE: HCPCS | Performed by: INTERNAL MEDICINE

## 2018-01-01 PROCEDURE — 63710000001 INSULIN LISPRO (HUMAN) PER 5 UNITS: Performed by: INTERNAL MEDICINE

## 2018-01-01 PROCEDURE — 25010000002 MIDAZOLAM PER 1 MG: Performed by: INTERNAL MEDICINE

## 2018-01-01 PROCEDURE — 93321 DOPPLER ECHO F-UP/LMTD STD: CPT

## 2018-01-01 PROCEDURE — 25010000002 HYDROMORPHONE PER 4 MG: Performed by: ANESTHESIOLOGY

## 2018-01-01 PROCEDURE — 25010000002 MIDAZOLAM PER 1 MG: Performed by: ANESTHESIOLOGY

## 2018-01-01 PROCEDURE — 99233 SBSQ HOSP IP/OBS HIGH 50: CPT | Performed by: INTERNAL MEDICINE

## 2018-01-01 PROCEDURE — 25010000002 PROTAMINE SULFATE PER 10 MG: Performed by: ANESTHESIOLOGY

## 2018-01-01 PROCEDURE — 93005 ELECTROCARDIOGRAM TRACING: CPT | Performed by: PHYSICIAN ASSISTANT

## 2018-01-01 PROCEDURE — 71045 X-RAY EXAM CHEST 1 VIEW: CPT

## 2018-01-01 PROCEDURE — 97110 THERAPEUTIC EXERCISES: CPT

## 2018-01-01 PROCEDURE — 85576 BLOOD PLATELET AGGREGATION: CPT | Performed by: PHYSICIAN ASSISTANT

## 2018-01-01 PROCEDURE — 33362 REPLACE AORTIC VALVE OPEN: CPT | Performed by: THORACIC SURGERY (CARDIOTHORACIC VASCULAR SURGERY)

## 2018-01-01 PROCEDURE — 93005 ELECTROCARDIOGRAM TRACING: CPT | Performed by: NURSE PRACTITIONER

## 2018-01-01 PROCEDURE — 25010000002 FENTANYL CITRATE (PF) 100 MCG/2ML SOLUTION: Performed by: INTERNAL MEDICINE

## 2018-01-01 PROCEDURE — A9270 NON-COVERED ITEM OR SERVICE: HCPCS | Performed by: RADIOLOGY

## 2018-01-01 PROCEDURE — 80048 BASIC METABOLIC PNL TOTAL CA: CPT | Performed by: NURSE PRACTITIONER

## 2018-01-01 PROCEDURE — 99214 OFFICE O/P EST MOD 30 MIN: CPT | Performed by: NURSE PRACTITIONER

## 2018-01-01 PROCEDURE — 99215 OFFICE O/P EST HI 40 MIN: CPT | Performed by: THORACIC SURGERY (CARDIOTHORACIC VASCULAR SURGERY)

## 2018-01-01 PROCEDURE — 25810000003 POTASSIUM CHLORIDE PER 2 MEQ: Performed by: INTERNAL MEDICINE

## 2018-01-01 PROCEDURE — 93880 EXTRACRANIAL BILAT STUDY: CPT | Performed by: INTERNAL MEDICINE

## 2018-01-01 PROCEDURE — 94729 DIFFUSING CAPACITY: CPT | Performed by: INTERNAL MEDICINE

## 2018-01-01 PROCEDURE — 82947 ASSAY GLUCOSE BLOOD QUANT: CPT

## 2018-01-01 PROCEDURE — 99205 OFFICE O/P NEW HI 60 MIN: CPT | Performed by: THORACIC SURGERY (CARDIOTHORACIC VASCULAR SURGERY)

## 2018-01-01 PROCEDURE — 80053 COMPREHEN METABOLIC PANEL: CPT | Performed by: NURSE PRACTITIONER

## 2018-01-01 PROCEDURE — 86870 RBC ANTIBODY IDENTIFICATION: CPT | Performed by: PHYSICIAN ASSISTANT

## 2018-01-01 PROCEDURE — 94727 GAS DIL/WSHOT DETER LNG VOL: CPT

## 2018-01-01 PROCEDURE — 25010000002 HEPARIN (PORCINE) PER 1000 UNITS: Performed by: THORACIC SURGERY (CARDIOTHORACIC VASCULAR SURGERY)

## 2018-01-01 PROCEDURE — 94727 GAS DIL/WSHOT DETER LNG VOL: CPT | Performed by: INTERNAL MEDICINE

## 2018-01-01 PROCEDURE — 86922 COMPATIBILITY TEST ANTIGLOB: CPT

## 2018-01-01 PROCEDURE — 25810000003 DEXTROSE 5 % WITH KCL 20 MEQ 20-5 MEQ/L-% SOLUTION: Performed by: PHYSICIAN ASSISTANT

## 2018-01-01 PROCEDURE — 83735 ASSAY OF MAGNESIUM: CPT | Performed by: PHYSICIAN ASSISTANT

## 2018-01-01 PROCEDURE — 86920 COMPATIBILITY TEST SPIN: CPT

## 2018-01-01 PROCEDURE — 94640 AIRWAY INHALATION TREATMENT: CPT

## 2018-01-01 PROCEDURE — 33362 REPLACE AORTIC VALVE OPEN: CPT | Performed by: INTERNAL MEDICINE

## 2018-01-01 PROCEDURE — 63710000001 INSULIN DETEMIR PER 5 UNITS: Performed by: INTERNAL MEDICINE

## 2018-01-01 PROCEDURE — 84295 ASSAY OF SERUM SODIUM: CPT

## 2018-01-01 PROCEDURE — 99152 MOD SED SAME PHYS/QHP 5/>YRS: CPT

## 2018-01-01 PROCEDURE — 86850 RBC ANTIBODY SCREEN: CPT

## 2018-01-01 PROCEDURE — C1751 CATH, INF, PER/CENT/MIDLINE: HCPCS | Performed by: ANESTHESIOLOGY

## 2018-01-01 PROCEDURE — 99221 1ST HOSP IP/OBS SF/LOW 40: CPT | Performed by: INTERNAL MEDICINE

## 2018-01-01 PROCEDURE — 82803 BLOOD GASES ANY COMBINATION: CPT

## 2018-01-01 PROCEDURE — 85730 THROMBOPLASTIN TIME PARTIAL: CPT | Performed by: PHYSICIAN ASSISTANT

## 2018-01-01 PROCEDURE — 93355 ECHO TRANSESOPHAGEAL (TEE): CPT

## 2018-01-01 PROCEDURE — 36415 COLL VENOUS BLD VENIPUNCTURE: CPT

## 2018-01-01 PROCEDURE — 63710000001 ALPRAZOLAM 0.5 MG TABLET: Performed by: RADIOLOGY

## 2018-01-01 PROCEDURE — 74174 CTA ABD&PLVS W/CONTRAST: CPT

## 2018-01-01 PROCEDURE — 97163 PT EVAL HIGH COMPLEX 45 MIN: CPT

## 2018-01-01 PROCEDURE — 85027 COMPLETE CBC AUTOMATED: CPT | Performed by: PHYSICIAN ASSISTANT

## 2018-01-01 PROCEDURE — 85007 BL SMEAR W/DIFF WBC COUNT: CPT | Performed by: PHYSICIAN ASSISTANT

## 2018-01-01 PROCEDURE — 63710000001 HYDROCODONE-ACETAMINOPHEN 5-325 MG TABLET: Performed by: INTERNAL MEDICINE

## 2018-01-01 PROCEDURE — 93459 L HRT ART/GRFT ANGIO: CPT | Performed by: INTERNAL MEDICINE

## 2018-01-01 PROCEDURE — 94760 N-INVAS EAR/PLS OXIMETRY 1: CPT

## 2018-01-01 PROCEDURE — 85610 PROTHROMBIN TIME: CPT | Performed by: PHYSICIAN ASSISTANT

## 2018-01-01 PROCEDURE — 93880 EXTRACRANIAL BILAT STUDY: CPT

## 2018-01-01 PROCEDURE — 94799 UNLISTED PULMONARY SVC/PX: CPT

## 2018-01-01 PROCEDURE — 99231 SBSQ HOSP IP/OBS SF/LOW 25: CPT | Performed by: PHYSICIAN ASSISTANT

## 2018-01-01 PROCEDURE — 93355 ECHO TRANSESOPHAGEAL (TEE): CPT | Performed by: INTERNAL MEDICINE

## 2018-01-01 PROCEDURE — 97116 GAIT TRAINING THERAPY: CPT

## 2018-01-01 PROCEDURE — 36415 COLL VENOUS BLD VENIPUNCTURE: CPT | Performed by: NURSE PRACTITIONER

## 2018-01-01 PROCEDURE — 85025 COMPLETE CBC W/AUTO DIFF WBC: CPT | Performed by: PHYSICIAN ASSISTANT

## 2018-01-01 PROCEDURE — 82565 ASSAY OF CREATININE: CPT

## 2018-01-01 PROCEDURE — 94060 EVALUATION OF WHEEZING: CPT

## 2018-01-01 PROCEDURE — C1894 INTRO/SHEATH, NON-LASER: HCPCS | Performed by: INTERNAL MEDICINE

## 2018-01-01 PROCEDURE — 84132 ASSAY OF SERUM POTASSIUM: CPT

## 2018-01-01 PROCEDURE — C1887 CATHETER, GUIDING: HCPCS | Performed by: THORACIC SURGERY (CARDIOTHORACIC VASCULAR SURGERY)

## 2018-01-01 PROCEDURE — B4101ZZ FLUOROSCOPY OF ABDOMINAL AORTA USING LOW OSMOLAR CONTRAST: ICD-10-PCS | Performed by: INTERNAL MEDICINE

## 2018-01-01 PROCEDURE — 93306 TTE W/DOPPLER COMPLETE: CPT | Performed by: INTERNAL MEDICINE

## 2018-01-01 PROCEDURE — 86900 BLOOD TYPING SEROLOGIC ABO: CPT | Performed by: PHYSICIAN ASSISTANT

## 2018-01-01 PROCEDURE — 85347 COAGULATION TIME ACTIVATED: CPT

## 2018-01-01 PROCEDURE — 63710000001 METOPROLOL TARTRATE 50 MG TABLET: Performed by: RADIOLOGY

## 2018-01-01 PROCEDURE — 86902 BLOOD TYPE ANTIGEN DONOR EA: CPT

## 2018-01-01 PROCEDURE — 25010000002 FENTANYL CITRATE (PF) 100 MCG/2ML SOLUTION: Performed by: ANESTHESIOLOGY

## 2018-01-01 PROCEDURE — 86901 BLOOD TYPING SEROLOGIC RH(D): CPT | Performed by: PHYSICIAN ASSISTANT

## 2018-01-01 PROCEDURE — S0260 H&P FOR SURGERY: HCPCS | Performed by: INTERNAL MEDICINE

## 2018-01-01 PROCEDURE — 80048 BASIC METABOLIC PNL TOTAL CA: CPT | Performed by: THORACIC SURGERY (CARDIOTHORACIC VASCULAR SURGERY)

## 2018-01-01 PROCEDURE — 82962 GLUCOSE BLOOD TEST: CPT

## 2018-01-01 PROCEDURE — 71046 X-RAY EXAM CHEST 2 VIEWS: CPT

## 2018-01-01 PROCEDURE — 94729 DIFFUSING CAPACITY: CPT

## 2018-01-01 PROCEDURE — 25010000002 PROPOFOL 10 MG/ML EMULSION: Performed by: ANESTHESIOLOGY

## 2018-01-01 PROCEDURE — 80306 DRUG TEST PRSMV INSTRMNT: CPT | Performed by: PHYSICIAN ASSISTANT

## 2018-01-01 PROCEDURE — 83036 HEMOGLOBIN GLYCOSYLATED A1C: CPT | Performed by: NURSE PRACTITIONER

## 2018-01-01 PROCEDURE — 93312 ECHO TRANSESOPHAGEAL: CPT | Performed by: INTERNAL MEDICINE

## 2018-01-01 PROCEDURE — 85014 HEMATOCRIT: CPT

## 2018-01-01 PROCEDURE — 0 IOPAMIDOL PER 1 ML: Performed by: NURSE PRACTITIONER

## 2018-01-01 PROCEDURE — 85027 COMPLETE CBC AUTOMATED: CPT | Performed by: NURSE PRACTITIONER

## 2018-01-01 PROCEDURE — 25010000003 CEFAZOLIN IN DEXTROSE 2-4 GM/100ML-% SOLUTION: Performed by: PHYSICIAN ASSISTANT

## 2018-01-01 PROCEDURE — A9270 NON-COVERED ITEM OR SERVICE: HCPCS | Performed by: INTERNAL MEDICINE

## 2018-01-01 PROCEDURE — 93325 DOPPLER ECHO COLOR FLOW MAPG: CPT | Performed by: INTERNAL MEDICINE

## 2018-01-01 PROCEDURE — 93306 TTE W/DOPPLER COMPLETE: CPT

## 2018-01-01 PROCEDURE — 94060 EVALUATION OF WHEEZING: CPT | Performed by: INTERNAL MEDICINE

## 2018-01-01 PROCEDURE — 99291 CRITICAL CARE FIRST HOUR: CPT | Performed by: INTERNAL MEDICINE

## 2018-01-01 PROCEDURE — 80061 LIPID PANEL: CPT | Performed by: NURSE PRACTITIONER

## 2018-01-01 PROCEDURE — 82330 ASSAY OF CALCIUM: CPT

## 2018-01-01 PROCEDURE — B24BZZ4 ULTRASONOGRAPHY OF HEART WITH AORTA, TRANSESOPHAGEAL: ICD-10-PCS | Performed by: INTERNAL MEDICINE

## 2018-01-01 PROCEDURE — 99232 SBSQ HOSP IP/OBS MODERATE 35: CPT | Performed by: INTERNAL MEDICINE

## 2018-01-01 PROCEDURE — 86850 RBC ANTIBODY SCREEN: CPT | Performed by: PHYSICIAN ASSISTANT

## 2018-01-01 PROCEDURE — 80069 RENAL FUNCTION PANEL: CPT | Performed by: PHYSICIAN ASSISTANT

## 2018-01-01 PROCEDURE — 93312 ECHO TRANSESOPHAGEAL: CPT

## 2018-01-01 PROCEDURE — 71275 CT ANGIOGRAPHY CHEST: CPT

## 2018-01-01 PROCEDURE — 02RF38Z REPLACEMENT OF AORTIC VALVE WITH ZOOPLASTIC TISSUE, PERCUTANEOUS APPROACH: ICD-10-PCS | Performed by: THORACIC SURGERY (CARDIOTHORACIC VASCULAR SURGERY)

## 2018-01-01 PROCEDURE — 25010000002 HEPARIN (PORCINE) PER 1000 UNITS: Performed by: ANESTHESIOLOGY

## 2018-01-01 PROCEDURE — 93321 DOPPLER ECHO F-UP/LMTD STD: CPT | Performed by: INTERNAL MEDICINE

## 2018-01-01 PROCEDURE — 80053 COMPREHEN METABOLIC PANEL: CPT | Performed by: PHYSICIAN ASSISTANT

## 2018-01-01 PROCEDURE — 93325 DOPPLER ECHO COLOR FLOW MAPG: CPT

## 2018-01-01 PROCEDURE — 25010000002 HYDROMORPHONE PER 4 MG: Performed by: THORACIC SURGERY (CARDIOTHORACIC VASCULAR SURGERY)

## 2018-01-01 DEVICE — VLV HEART TRNSCATH SAPIEN3 26MM: Type: IMPLANTABLE DEVICE | Site: HEART | Status: FUNCTIONAL

## 2018-01-01 RX ORDER — SENNA AND DOCUSATE SODIUM 50; 8.6 MG/1; MG/1
2 TABLET, FILM COATED ORAL NIGHTLY
Status: DISCONTINUED | OUTPATIENT
Start: 2018-01-01 | End: 2018-01-01 | Stop reason: HOSPADM

## 2018-01-01 RX ORDER — PHENYLEPHRINE HCL IN 0.9% NACL 0.5 MG/5ML
.5-3 SYRINGE (ML) INTRAVENOUS CONTINUOUS PRN
Status: DISCONTINUED | OUTPATIENT
Start: 2018-01-01 | End: 2018-01-01

## 2018-01-01 RX ORDER — CHLORHEXIDINE GLUCONATE 500 MG/1
1 CLOTH TOPICAL EVERY 12 HOURS PRN
Status: DISCONTINUED | OUTPATIENT
Start: 2018-01-01 | End: 2018-01-01

## 2018-01-01 RX ORDER — INSULIN GLARGINE 100 [IU]/ML
60 INJECTION, SOLUTION SUBCUTANEOUS EVERY 12 HOURS
COMMUNITY

## 2018-01-01 RX ORDER — PROPOFOL 10 MG/ML
VIAL (ML) INTRAVENOUS AS NEEDED
Status: DISCONTINUED | OUTPATIENT
Start: 2018-01-01 | End: 2018-01-01 | Stop reason: SURG

## 2018-01-01 RX ORDER — SODIUM CHLORIDE 0.9 % (FLUSH) 0.9 %
1-10 SYRINGE (ML) INJECTION AS NEEDED
Status: DISCONTINUED | OUTPATIENT
Start: 2018-01-01 | End: 2018-01-01 | Stop reason: HOSPADM

## 2018-01-01 RX ORDER — SODIUM CHLORIDE 9 MG/ML
1-3 INJECTION, SOLUTION INTRAVENOUS CONTINUOUS
Status: DISCONTINUED | OUTPATIENT
Start: 2018-01-01 | End: 2018-01-01 | Stop reason: HOSPADM

## 2018-01-01 RX ORDER — SODIUM CHLORIDE, SODIUM LACTATE, POTASSIUM CHLORIDE, CALCIUM CHLORIDE 600; 310; 30; 20 MG/100ML; MG/100ML; MG/100ML; MG/100ML
9 INJECTION, SOLUTION INTRAVENOUS CONTINUOUS
Status: DISCONTINUED | OUTPATIENT
Start: 2018-01-01 | End: 2018-01-01

## 2018-01-01 RX ORDER — MAGNESIUM SULFATE HEPTAHYDRATE 40 MG/ML
2 INJECTION, SOLUTION INTRAVENOUS AS NEEDED
Status: DISCONTINUED | OUTPATIENT
Start: 2018-01-01 | End: 2018-01-01

## 2018-01-01 RX ORDER — BUMETANIDE 1 MG/1
1 TABLET ORAL 3 TIMES DAILY
Qty: 90 TABLET | Refills: 0 | Status: SHIPPED | OUTPATIENT
Start: 2018-01-01 | End: 2018-01-01 | Stop reason: DRUGHIGH

## 2018-01-01 RX ORDER — SODIUM CHLORIDE 9 MG/ML
30 INJECTION, SOLUTION INTRAVENOUS CONTINUOUS PRN
Status: DISCONTINUED | OUTPATIENT
Start: 2018-01-01 | End: 2018-01-01

## 2018-01-01 RX ORDER — CEFAZOLIN SODIUM 2 G/100ML
2 INJECTION, SOLUTION INTRAVENOUS ONCE
Status: COMPLETED | OUTPATIENT
Start: 2018-01-01 | End: 2018-01-01

## 2018-01-01 RX ORDER — CLONAZEPAM 0.5 MG/1
0.5 TABLET ORAL NIGHTLY
Status: DISCONTINUED | OUTPATIENT
Start: 2018-01-01 | End: 2018-01-01 | Stop reason: HOSPADM

## 2018-01-01 RX ORDER — BUMETANIDE 1 MG/1
1 TABLET ORAL 3 TIMES DAILY
COMMUNITY
End: 2018-01-01 | Stop reason: SDUPTHER

## 2018-01-01 RX ORDER — NITROGLYCERIN 0.4 MG/1
0.4 TABLET SUBLINGUAL
Status: DISCONTINUED | OUTPATIENT
Start: 2018-01-01 | End: 2018-01-01

## 2018-01-01 RX ORDER — NITROGLYCERIN 20 MG/100ML
5-200 INJECTION INTRAVENOUS CONTINUOUS PRN
Status: DISCONTINUED | OUTPATIENT
Start: 2018-01-01 | End: 2018-01-01

## 2018-01-01 RX ORDER — GABAPENTIN 400 MG/1
800 CAPSULE ORAL 4 TIMES DAILY
Status: DISCONTINUED | OUTPATIENT
Start: 2018-01-01 | End: 2018-01-01 | Stop reason: HOSPADM

## 2018-01-01 RX ORDER — FLUMAZENIL 0.1 MG/ML
INJECTION INTRAVENOUS
Status: DISCONTINUED
Start: 2018-01-01 | End: 2018-01-01 | Stop reason: WASHOUT

## 2018-01-01 RX ORDER — NALOXONE HYDROCHLORIDE 0.4 MG/ML
INJECTION, SOLUTION INTRAMUSCULAR; INTRAVENOUS; SUBCUTANEOUS
Status: DISCONTINUED
Start: 2018-01-01 | End: 2018-01-01 | Stop reason: WASHOUT

## 2018-01-01 RX ORDER — ALBUTEROL SULFATE 2.5 MG/3ML
2.5 SOLUTION RESPIRATORY (INHALATION) EVERY 4 HOURS PRN
Status: DISCONTINUED | OUTPATIENT
Start: 2018-01-01 | End: 2018-01-01 | Stop reason: SDUPTHER

## 2018-01-01 RX ORDER — HEPARIN SODIUM 1000 [USP'U]/ML
INJECTION, SOLUTION INTRAVENOUS; SUBCUTANEOUS AS NEEDED
Status: DISCONTINUED | OUTPATIENT
Start: 2018-01-01 | End: 2018-01-01 | Stop reason: SURG

## 2018-01-01 RX ORDER — POTASSIUM CHLORIDE 29.8 MG/ML
20 INJECTION INTRAVENOUS
Status: DISCONTINUED | OUTPATIENT
Start: 2018-01-01 | End: 2018-01-01

## 2018-01-01 RX ORDER — DEXTROSE MONOHYDRATE 25 G/50ML
25 INJECTION, SOLUTION INTRAVENOUS
Status: DISCONTINUED | OUTPATIENT
Start: 2018-01-01 | End: 2018-01-01 | Stop reason: HOSPADM

## 2018-01-01 RX ORDER — METOLAZONE 5 MG/1
5 TABLET ORAL AS NEEDED
COMMUNITY

## 2018-01-01 RX ORDER — ASPIRIN 81 MG/1
81 TABLET ORAL DAILY
Status: DISCONTINUED | OUTPATIENT
Start: 2018-01-01 | End: 2018-01-01

## 2018-01-01 RX ORDER — CHLORHEXIDINE GLUCONATE 500 MG/1
1 CLOTH TOPICAL EVERY 12 HOURS PRN
Status: DISCONTINUED | OUTPATIENT
Start: 2018-01-01 | End: 2018-01-01 | Stop reason: HOSPADM

## 2018-01-01 RX ORDER — HYDROMORPHONE HYDROCHLORIDE 1 MG/ML
0.5 INJECTION, SOLUTION INTRAMUSCULAR; INTRAVENOUS; SUBCUTANEOUS
Status: DISCONTINUED | OUTPATIENT
Start: 2018-01-01 | End: 2018-01-01

## 2018-01-01 RX ORDER — ASPIRIN 325 MG
325 TABLET ORAL NIGHTLY
Status: DISCONTINUED | OUTPATIENT
Start: 2018-01-01 | End: 2018-01-01

## 2018-01-01 RX ORDER — ACETAMINOPHEN 325 MG/1
650 TABLET ORAL EVERY 4 HOURS PRN
Status: DISCONTINUED | OUTPATIENT
Start: 2018-01-01 | End: 2018-01-01 | Stop reason: HOSPADM

## 2018-01-01 RX ORDER — NICOTINE POLACRILEX 4 MG
15 LOZENGE BUCCAL
Status: DISCONTINUED | OUTPATIENT
Start: 2018-01-01 | End: 2018-01-01 | Stop reason: HOSPADM

## 2018-01-01 RX ORDER — DOCUSATE SODIUM 100 MG/1
100 CAPSULE, LIQUID FILLED ORAL 2 TIMES DAILY PRN
Status: DISCONTINUED | OUTPATIENT
Start: 2018-01-01 | End: 2018-01-01 | Stop reason: HOSPADM

## 2018-01-01 RX ORDER — NITROGLYCERIN 0.4 MG/1
0.4 TABLET SUBLINGUAL
Status: CANCELLED | OUTPATIENT
Start: 2018-01-01

## 2018-01-01 RX ORDER — MIDAZOLAM HYDROCHLORIDE 1 MG/ML
INJECTION INTRAMUSCULAR; INTRAVENOUS AS NEEDED
Status: DISCONTINUED | OUTPATIENT
Start: 2018-01-01 | End: 2018-01-01 | Stop reason: SURG

## 2018-01-01 RX ORDER — FAMOTIDINE 20 MG/1
20 TABLET, FILM COATED ORAL 2 TIMES DAILY
Status: DISCONTINUED | OUTPATIENT
Start: 2018-01-01 | End: 2018-01-01

## 2018-01-01 RX ORDER — MIDAZOLAM HYDROCHLORIDE 1 MG/ML
INJECTION INTRAMUSCULAR; INTRAVENOUS
Status: DISCONTINUED
Start: 2018-01-01 | End: 2018-01-01 | Stop reason: WASHOUT

## 2018-01-01 RX ORDER — POTASSIUM CHLORIDE 750 MG/1
40 CAPSULE, EXTENDED RELEASE ORAL AS NEEDED
Status: DISCONTINUED | OUTPATIENT
Start: 2018-01-01 | End: 2018-01-01

## 2018-01-01 RX ORDER — FENTANYL CITRATE 50 UG/ML
INJECTION, SOLUTION INTRAMUSCULAR; INTRAVENOUS
Status: DISCONTINUED
Start: 2018-01-01 | End: 2018-01-01 | Stop reason: HOSPADM

## 2018-01-01 RX ORDER — MIDAZOLAM HYDROCHLORIDE 1 MG/ML
INJECTION INTRAMUSCULAR; INTRAVENOUS
Status: DISCONTINUED
Start: 2018-01-01 | End: 2018-01-01 | Stop reason: HOSPADM

## 2018-01-01 RX ORDER — CARVEDILOL 12.5 MG/1
12.5 TABLET ORAL 2 TIMES DAILY WITH MEALS
Qty: 60 TABLET | Refills: 0 | Status: SHIPPED | OUTPATIENT
Start: 2018-01-01

## 2018-01-01 RX ORDER — FENTANYL CITRATE 50 UG/ML
INJECTION, SOLUTION INTRAMUSCULAR; INTRAVENOUS
Status: COMPLETED | OUTPATIENT
Start: 2018-01-01 | End: 2018-01-01

## 2018-01-01 RX ORDER — DONEPEZIL HYDROCHLORIDE 10 MG/1
5 TABLET, FILM COATED ORAL NIGHTLY
Status: DISCONTINUED | OUTPATIENT
Start: 2018-01-01 | End: 2018-01-01 | Stop reason: HOSPADM

## 2018-01-01 RX ORDER — MAGNESIUM SULFATE HEPTAHYDRATE 40 MG/ML
4 INJECTION, SOLUTION INTRAVENOUS AS NEEDED
Status: DISCONTINUED | OUTPATIENT
Start: 2018-01-01 | End: 2018-01-01

## 2018-01-01 RX ORDER — DEXMEDETOMIDINE HYDROCHLORIDE 4 UG/ML
.2-1.5 INJECTION, SOLUTION INTRAVENOUS CONTINUOUS PRN
Status: DISCONTINUED | OUTPATIENT
Start: 2018-01-01 | End: 2018-01-01

## 2018-01-01 RX ORDER — FERROUS SULFATE 325(65) MG
325 TABLET ORAL
Status: DISCONTINUED | OUTPATIENT
Start: 2018-01-01 | End: 2018-01-01 | Stop reason: HOSPADM

## 2018-01-01 RX ORDER — FENTANYL CITRATE 50 UG/ML
INJECTION, SOLUTION INTRAMUSCULAR; INTRAVENOUS AS NEEDED
Status: DISCONTINUED | OUTPATIENT
Start: 2018-01-01 | End: 2018-01-01 | Stop reason: SURG

## 2018-01-01 RX ORDER — ROSUVASTATIN CALCIUM 20 MG/1
20 TABLET, COATED ORAL NIGHTLY
Status: DISCONTINUED | OUTPATIENT
Start: 2018-01-01 | End: 2018-01-01 | Stop reason: HOSPADM

## 2018-01-01 RX ORDER — HYDROMORPHONE HYDROCHLORIDE 1 MG/ML
0.5 INJECTION, SOLUTION INTRAMUSCULAR; INTRAVENOUS; SUBCUTANEOUS
Status: DISCONTINUED | OUTPATIENT
Start: 2018-01-01 | End: 2018-01-01 | Stop reason: HOSPADM

## 2018-01-01 RX ORDER — SODIUM CHLORIDE 0.9 % (FLUSH) 0.9 %
1-10 SYRINGE (ML) INJECTION AS NEEDED
Status: CANCELLED | OUTPATIENT
Start: 2018-01-01

## 2018-01-01 RX ORDER — SODIUM CHLORIDE 9 MG/ML
INJECTION, SOLUTION INTRAVENOUS CONTINUOUS PRN
Status: DISCONTINUED | OUTPATIENT
Start: 2018-01-01 | End: 2018-01-01 | Stop reason: SURG

## 2018-01-01 RX ORDER — ALBUTEROL SULFATE 90 UG/1
1 AEROSOL, METERED RESPIRATORY (INHALATION) EVERY 4 HOURS PRN
COMMUNITY
Start: 2018-01-01

## 2018-01-01 RX ORDER — CHLORHEXIDINE GLUCONATE 0.12 MG/ML
15 RINSE ORAL EVERY 12 HOURS SCHEDULED
Status: DISCONTINUED | OUTPATIENT
Start: 2018-01-01 | End: 2018-01-01

## 2018-01-01 RX ORDER — DOPAMINE HYDROCHLORIDE 160 MG/100ML
2-20 INJECTION, SOLUTION INTRAVENOUS CONTINUOUS PRN
Status: DISCONTINUED | OUTPATIENT
Start: 2018-01-01 | End: 2018-01-01

## 2018-01-01 RX ORDER — CEFAZOLIN SODIUM 2 G/100ML
2 INJECTION, SOLUTION INTRAVENOUS EVERY 8 HOURS
Status: DISCONTINUED | OUTPATIENT
Start: 2018-01-01 | End: 2018-01-01

## 2018-01-01 RX ORDER — METOPROLOL TARTRATE 5 MG/5ML
2.5 INJECTION INTRAVENOUS EVERY 6 HOURS SCHEDULED
Status: DISCONTINUED | OUTPATIENT
Start: 2018-01-01 | End: 2018-01-01

## 2018-01-01 RX ORDER — POTASSIUM CHLORIDE 750 MG/1
10 CAPSULE, EXTENDED RELEASE ORAL DAILY
Status: DISCONTINUED | OUTPATIENT
Start: 2018-01-01 | End: 2018-01-01

## 2018-01-01 RX ORDER — HYDROCODONE BITARTRATE AND ACETAMINOPHEN 7.5; 325 MG/1; MG/1
1 TABLET ORAL EVERY 4 HOURS PRN
Status: DISCONTINUED | OUTPATIENT
Start: 2018-01-01 | End: 2018-01-01

## 2018-01-01 RX ORDER — ASPIRIN 325 MG
325 TABLET ORAL NIGHTLY
Status: CANCELLED | OUTPATIENT
Start: 2018-01-01 | End: 2018-01-01

## 2018-01-01 RX ORDER — MIDAZOLAM HYDROCHLORIDE 1 MG/ML
INJECTION INTRAMUSCULAR; INTRAVENOUS AS NEEDED
Status: DISCONTINUED | OUTPATIENT
Start: 2018-01-01 | End: 2018-01-01 | Stop reason: HOSPADM

## 2018-01-01 RX ORDER — BISACODYL 10 MG
10 SUPPOSITORY, RECTAL RECTAL DAILY PRN
Status: DISCONTINUED | OUTPATIENT
Start: 2018-01-01 | End: 2018-01-01

## 2018-01-01 RX ORDER — SODIUM CHLORIDE AND POTASSIUM CHLORIDE 150; 450 MG/100ML; MG/100ML
50 INJECTION, SOLUTION INTRAVENOUS CONTINUOUS
Status: DISCONTINUED | OUTPATIENT
Start: 2018-01-01 | End: 2018-01-01

## 2018-01-01 RX ORDER — POTASSIUM CHLORIDE 1.5 G/1.77G
40 POWDER, FOR SOLUTION ORAL AS NEEDED
Status: DISCONTINUED | OUTPATIENT
Start: 2018-01-01 | End: 2018-01-01

## 2018-01-01 RX ORDER — AMITRIPTYLINE HYDROCHLORIDE 25 MG/1
25 TABLET, FILM COATED ORAL NIGHTLY PRN
Status: DISCONTINUED | OUTPATIENT
Start: 2018-01-01 | End: 2018-01-01 | Stop reason: HOSPADM

## 2018-01-01 RX ORDER — ONDANSETRON 2 MG/ML
4 INJECTION INTRAMUSCULAR; INTRAVENOUS EVERY 6 HOURS PRN
Status: DISCONTINUED | OUTPATIENT
Start: 2018-01-01 | End: 2018-01-01 | Stop reason: HOSPADM

## 2018-01-01 RX ORDER — FENTANYL CITRATE 50 UG/ML
INJECTION, SOLUTION INTRAMUSCULAR; INTRAVENOUS AS NEEDED
Status: DISCONTINUED | OUTPATIENT
Start: 2018-01-01 | End: 2018-01-01 | Stop reason: HOSPADM

## 2018-01-01 RX ORDER — METOPROLOL TARTRATE 50 MG/1
50 TABLET, FILM COATED ORAL EVERY 12 HOURS SCHEDULED
Status: COMPLETED | OUTPATIENT
Start: 2018-01-01 | End: 2018-01-01

## 2018-01-01 RX ORDER — MORPHINE SULFATE 2 MG/ML
2 INJECTION, SOLUTION INTRAMUSCULAR; INTRAVENOUS
Status: DISCONTINUED | OUTPATIENT
Start: 2018-01-01 | End: 2018-01-01

## 2018-01-01 RX ORDER — ACETAMINOPHEN 325 MG/1
650 TABLET ORAL EVERY 4 HOURS PRN
Qty: 60 TABLET | Refills: 0 | Status: SHIPPED | OUTPATIENT
Start: 2018-01-01

## 2018-01-01 RX ORDER — CHLORHEXIDINE GLUCONATE 500 MG/1
1 CLOTH TOPICAL EVERY 12 HOURS PRN
Status: CANCELLED | OUTPATIENT
Start: 2018-01-01

## 2018-01-01 RX ORDER — HYDROCODONE BITARTRATE AND ACETAMINOPHEN 7.5; 325 MG/1; MG/1
1 TABLET ORAL EVERY 4 HOURS PRN
Status: DISCONTINUED | OUTPATIENT
Start: 2018-01-01 | End: 2018-01-01 | Stop reason: HOSPADM

## 2018-01-01 RX ORDER — HYDROCODONE BITARTRATE AND ACETAMINOPHEN 5; 325 MG/1; MG/1
1 TABLET ORAL EVERY 4 HOURS PRN
Status: DISCONTINUED | OUTPATIENT
Start: 2018-01-01 | End: 2018-01-01 | Stop reason: HOSPADM

## 2018-01-01 RX ORDER — ERGOCALCIFEROL 1.25 MG/1
50000 CAPSULE ORAL WEEKLY
COMMUNITY

## 2018-01-01 RX ORDER — ALPRAZOLAM 0.5 MG/1
0.5 TABLET ORAL
Status: DISCONTINUED | OUTPATIENT
Start: 2018-01-01 | End: 2018-01-01 | Stop reason: HOSPADM

## 2018-01-01 RX ORDER — CLOPIDOGREL BISULFATE 75 MG/1
75 TABLET ORAL DAILY
Status: DISCONTINUED | OUTPATIENT
Start: 2018-01-01 | End: 2018-01-01 | Stop reason: HOSPADM

## 2018-01-01 RX ORDER — MEPERIDINE HYDROCHLORIDE 25 MG/ML
25 INJECTION INTRAMUSCULAR; INTRAVENOUS; SUBCUTANEOUS EVERY 4 HOURS PRN
Status: DISCONTINUED | OUTPATIENT
Start: 2018-01-01 | End: 2018-01-01

## 2018-01-01 RX ORDER — MIDAZOLAM HYDROCHLORIDE 1 MG/ML
INJECTION INTRAMUSCULAR; INTRAVENOUS
Status: COMPLETED | OUTPATIENT
Start: 2018-01-01 | End: 2018-01-01

## 2018-01-01 RX ORDER — NICOTINE 21 MG/24HR
1 PATCH, TRANSDERMAL 24 HOURS TRANSDERMAL EVERY 24 HOURS
Status: DISCONTINUED | OUTPATIENT
Start: 2018-01-01 | End: 2018-01-01 | Stop reason: HOSPADM

## 2018-01-01 RX ORDER — ATORVASTATIN CALCIUM 40 MG/1
40 TABLET, FILM COATED ORAL NIGHTLY
Status: DISCONTINUED | OUTPATIENT
Start: 2018-01-01 | End: 2018-01-01

## 2018-01-01 RX ORDER — ALBUMIN, HUMAN INJ 5% 5 %
500 SOLUTION INTRAVENOUS AS NEEDED
Status: DISCONTINUED | OUTPATIENT
Start: 2018-01-01 | End: 2018-01-01 | Stop reason: HOSPADM

## 2018-01-01 RX ORDER — ACETAMINOPHEN 325 MG/1
650 TABLET ORAL EVERY 4 HOURS PRN
Status: CANCELLED | OUTPATIENT
Start: 2018-01-01

## 2018-01-01 RX ORDER — LIDOCAINE HYDROCHLORIDE 10 MG/ML
INJECTION, SOLUTION EPIDURAL; INFILTRATION; INTRACAUDAL; PERINEURAL AS NEEDED
Status: DISCONTINUED | OUTPATIENT
Start: 2018-01-01 | End: 2018-01-01 | Stop reason: SURG

## 2018-01-01 RX ORDER — LIDOCAINE HYDROCHLORIDE 10 MG/ML
0.5 INJECTION, SOLUTION EPIDURAL; INFILTRATION; INTRACAUDAL; PERINEURAL ONCE AS NEEDED
Status: COMPLETED | OUTPATIENT
Start: 2018-01-01 | End: 2018-01-01

## 2018-01-01 RX ORDER — ROCURONIUM BROMIDE 10 MG/ML
INJECTION, SOLUTION INTRAVENOUS AS NEEDED
Status: DISCONTINUED | OUTPATIENT
Start: 2018-01-01 | End: 2018-01-01 | Stop reason: SURG

## 2018-01-01 RX ORDER — POTASSIUM CHLORIDE 20 MEQ/1
20 TABLET, EXTENDED RELEASE ORAL DAILY
Qty: 30 TABLET | Refills: 1 | Status: SHIPPED | OUTPATIENT
Start: 2018-01-01

## 2018-01-01 RX ORDER — BUMETANIDE 1 MG/1
1 TABLET ORAL DAILY
Qty: 30 TABLET | Refills: 1 | Status: SHIPPED | OUTPATIENT
Start: 2018-01-01

## 2018-01-01 RX ORDER — MORPHINE SULFATE 2 MG/ML
2 INJECTION, SOLUTION INTRAMUSCULAR; INTRAVENOUS EVERY 4 HOURS PRN
Status: DISCONTINUED | OUTPATIENT
Start: 2018-01-01 | End: 2018-01-01

## 2018-01-01 RX ORDER — CYCLOBENZAPRINE HCL 10 MG
10 TABLET ORAL NIGHTLY PRN
Status: DISCONTINUED | OUTPATIENT
Start: 2018-01-01 | End: 2018-01-01 | Stop reason: HOSPADM

## 2018-01-01 RX ORDER — OXYCODONE HYDROCHLORIDE AND ACETAMINOPHEN 5; 325 MG/1; MG/1
2 TABLET ORAL EVERY 4 HOURS PRN
Status: DISCONTINUED | OUTPATIENT
Start: 2018-01-01 | End: 2018-01-01

## 2018-01-01 RX ORDER — ASPIRIN 325 MG
325 TABLET ORAL ONCE
Status: CANCELLED | OUTPATIENT
Start: 2018-01-01 | End: 2018-01-01

## 2018-01-01 RX ORDER — FAMOTIDINE 20 MG/1
20 TABLET, FILM COATED ORAL ONCE
Status: COMPLETED | OUTPATIENT
Start: 2018-01-01 | End: 2018-01-01

## 2018-01-01 RX ORDER — ONDANSETRON 2 MG/ML
4 INJECTION INTRAMUSCULAR; INTRAVENOUS EVERY 6 HOURS PRN
Status: CANCELLED | OUTPATIENT
Start: 2018-01-01

## 2018-01-01 RX ORDER — PROTAMINE SULFATE 10 MG/ML
INJECTION, SOLUTION INTRAVENOUS AS NEEDED
Status: DISCONTINUED | OUTPATIENT
Start: 2018-01-01 | End: 2018-01-01 | Stop reason: SURG

## 2018-01-01 RX ORDER — MAGNESIUM HYDROXIDE 1200 MG/15ML
LIQUID ORAL AS NEEDED
Status: DISCONTINUED | OUTPATIENT
Start: 2018-01-01 | End: 2018-01-01 | Stop reason: HOSPADM

## 2018-01-01 RX ORDER — NITROGLYCERIN 0.4 MG/1
0.4 TABLET SUBLINGUAL
Status: DISCONTINUED | OUTPATIENT
Start: 2018-01-01 | End: 2018-01-01 | Stop reason: HOSPADM

## 2018-01-01 RX ORDER — PROTAMINE SULFATE 10 MG/ML
50 INJECTION, SOLUTION INTRAVENOUS ONCE
Status: DISCONTINUED | OUTPATIENT
Start: 2018-01-01 | End: 2018-01-01

## 2018-01-01 RX ORDER — SENNA AND DOCUSATE SODIUM 50; 8.6 MG/1; MG/1
2 TABLET, FILM COATED ORAL NIGHTLY PRN
Status: DISCONTINUED | OUTPATIENT
Start: 2018-01-01 | End: 2018-01-01

## 2018-01-01 RX ORDER — TAMSULOSIN HYDROCHLORIDE 0.4 MG/1
0.4 CAPSULE ORAL DAILY
Status: DISCONTINUED | OUTPATIENT
Start: 2018-01-01 | End: 2018-01-01 | Stop reason: HOSPADM

## 2018-01-01 RX ORDER — CLONAZEPAM 0.5 MG/1
0.5 TABLET ORAL 2 TIMES DAILY PRN
COMMUNITY
End: 2018-01-01

## 2018-01-01 RX ORDER — DIPHENHYDRAMINE HYDROCHLORIDE 50 MG/ML
25 INJECTION INTRAMUSCULAR; INTRAVENOUS EVERY 6 HOURS PRN
Status: DISCONTINUED | OUTPATIENT
Start: 2018-01-01 | End: 2018-01-01 | Stop reason: HOSPADM

## 2018-01-01 RX ORDER — FENTANYL CITRATE 50 UG/ML
INJECTION, SOLUTION INTRAMUSCULAR; INTRAVENOUS
Status: DISCONTINUED
Start: 2018-01-01 | End: 2018-01-01 | Stop reason: WASHOUT

## 2018-01-01 RX ORDER — LEVOTHYROXINE SODIUM 0.05 MG/1
50 TABLET ORAL
Status: DISCONTINUED | OUTPATIENT
Start: 2018-01-01 | End: 2018-01-01 | Stop reason: HOSPADM

## 2018-01-01 RX ORDER — FENTANYL CITRATE 50 UG/ML
25 INJECTION, SOLUTION INTRAMUSCULAR; INTRAVENOUS
Status: DISCONTINUED | OUTPATIENT
Start: 2018-01-01 | End: 2018-01-01

## 2018-01-01 RX ORDER — NALOXONE HCL 0.4 MG/ML
0.4 VIAL (ML) INJECTION
Status: DISCONTINUED | OUTPATIENT
Start: 2018-01-01 | End: 2018-01-01

## 2018-01-01 RX ORDER — BUDESONIDE AND FORMOTEROL FUMARATE DIHYDRATE 80; 4.5 UG/1; UG/1
2 AEROSOL RESPIRATORY (INHALATION)
Status: DISCONTINUED | OUTPATIENT
Start: 2018-01-01 | End: 2018-01-01 | Stop reason: HOSPADM

## 2018-01-01 RX ORDER — CHLORHEXIDINE GLUCONATE 0.12 MG/ML
15 RINSE ORAL ONCE
Status: CANCELLED | OUTPATIENT
Start: 2018-01-01 | End: 2018-01-01

## 2018-01-01 RX ORDER — FAMOTIDINE 10 MG/ML
20 INJECTION, SOLUTION INTRAVENOUS ONCE
Status: CANCELLED | OUTPATIENT
Start: 2018-01-01 | End: 2018-01-01

## 2018-01-01 RX ORDER — ASPIRIN 325 MG
325 TABLET ORAL ONCE
Status: COMPLETED | OUTPATIENT
Start: 2018-01-01 | End: 2018-01-01

## 2018-01-01 RX ORDER — BISACODYL 5 MG/1
10 TABLET, DELAYED RELEASE ORAL DAILY PRN
Status: DISCONTINUED | OUTPATIENT
Start: 2018-01-01 | End: 2018-01-01 | Stop reason: HOSPADM

## 2018-01-01 RX ORDER — PANTOPRAZOLE SODIUM 40 MG/1
40 TABLET, DELAYED RELEASE ORAL DAILY
Status: DISCONTINUED | OUTPATIENT
Start: 2018-01-01 | End: 2018-01-01 | Stop reason: HOSPADM

## 2018-01-01 RX ORDER — FENTANYL CITRATE 50 UG/ML
50 INJECTION, SOLUTION INTRAMUSCULAR; INTRAVENOUS
Status: DISCONTINUED | OUTPATIENT
Start: 2018-01-01 | End: 2018-01-01

## 2018-01-01 RX ORDER — ASPIRIN 325 MG
325 TABLET, DELAYED RELEASE (ENTERIC COATED) ORAL DAILY
Status: DISCONTINUED | OUTPATIENT
Start: 2018-01-01 | End: 2018-01-01 | Stop reason: HOSPADM

## 2018-01-01 RX ORDER — FAMOTIDINE 10 MG/ML
20 INJECTION, SOLUTION INTRAVENOUS 2 TIMES DAILY
Status: DISCONTINUED | OUTPATIENT
Start: 2018-01-01 | End: 2018-01-01

## 2018-01-01 RX ORDER — CHLORHEXIDINE GLUCONATE 0.12 MG/ML
15 RINSE ORAL ONCE
Status: COMPLETED | OUTPATIENT
Start: 2018-01-01 | End: 2018-01-01

## 2018-01-01 RX ORDER — METOLAZONE 5 MG/1
5 TABLET ORAL 3 TIMES WEEKLY
Status: DISCONTINUED | OUTPATIENT
Start: 2018-01-01 | End: 2018-01-01

## 2018-01-01 RX ORDER — LIDOCAINE HYDROCHLORIDE 10 MG/ML
INJECTION, SOLUTION EPIDURAL; INFILTRATION; INTRACAUDAL; PERINEURAL AS NEEDED
Status: DISCONTINUED | OUTPATIENT
Start: 2018-01-01 | End: 2018-01-01 | Stop reason: HOSPADM

## 2018-01-01 RX ORDER — ETOMIDATE 2 MG/ML
INJECTION INTRAVENOUS AS NEEDED
Status: DISCONTINUED | OUTPATIENT
Start: 2018-01-01 | End: 2018-01-01 | Stop reason: SURG

## 2018-01-01 RX ORDER — ASPIRIN 325 MG
325 TABLET, DELAYED RELEASE (ENTERIC COATED) ORAL DAILY
Status: CANCELLED | OUTPATIENT
Start: 2018-01-01

## 2018-01-01 RX ORDER — DOBUTAMINE HYDROCHLORIDE 100 MG/100ML
2-20 INJECTION INTRAVENOUS CONTINUOUS PRN
Status: DISCONTINUED | OUTPATIENT
Start: 2018-01-01 | End: 2018-01-01

## 2018-01-01 RX ORDER — SODIUM CHLORIDE 0.9 % (FLUSH) 0.9 %
1-10 SYRINGE (ML) INJECTION EVERY 8 HOURS
Status: DISCONTINUED | OUTPATIENT
Start: 2018-01-01 | End: 2018-01-01 | Stop reason: HOSPADM

## 2018-01-01 RX ORDER — CEFAZOLIN SODIUM 2 G/100ML
2 INJECTION, SOLUTION INTRAVENOUS ONCE
Status: CANCELLED | OUTPATIENT
Start: 2018-01-01 | End: 2018-01-01

## 2018-01-01 RX ORDER — SODIUM CHLORIDE 0.9 % (FLUSH) 0.9 %
30 SYRINGE (ML) INJECTION ONCE AS NEEDED
Status: DISCONTINUED | OUTPATIENT
Start: 2018-01-01 | End: 2018-01-01

## 2018-01-01 RX ORDER — PAROXETINE HYDROCHLORIDE 20 MG/1
40 TABLET, FILM COATED ORAL DAILY
Status: DISCONTINUED | OUTPATIENT
Start: 2018-01-01 | End: 2018-01-01 | Stop reason: HOSPADM

## 2018-01-01 RX ORDER — POTASSIUM CHLORIDE, DEXTROSE MONOHYDRATE 150; 5 MG/100ML; G/100ML
30 INJECTION, SOLUTION INTRAVENOUS CONTINUOUS
Status: DISCONTINUED | OUTPATIENT
Start: 2018-01-01 | End: 2018-01-01

## 2018-01-01 RX ADMIN — HYDROCODONE BITARTRATE AND ACETAMINOPHEN 1 TABLET: 7.5; 325 TABLET ORAL at 03:26

## 2018-01-01 RX ADMIN — CLOPIDOGREL BISULFATE 75 MG: 75 TABLET ORAL at 08:19

## 2018-01-01 RX ADMIN — METOPROLOL TARTRATE 12.5 MG: 25 TABLET, FILM COATED ORAL at 08:19

## 2018-01-01 RX ADMIN — FENTANYL CITRATE 50 MCG: 50 INJECTION, SOLUTION INTRAMUSCULAR; INTRAVENOUS at 09:39

## 2018-01-01 RX ADMIN — INSULIN LISPRO 8 UNITS: 100 INJECTION, SOLUTION INTRAVENOUS; SUBCUTANEOUS at 08:19

## 2018-01-01 RX ADMIN — ASPIRIN 325 MG ORAL TABLET 325 MG: 325 PILL ORAL at 08:12

## 2018-01-01 RX ADMIN — HYDROMORPHONE HYDROCHLORIDE 0.5 MG: 10 INJECTION INTRAMUSCULAR; INTRAVENOUS; SUBCUTANEOUS at 21:11

## 2018-01-01 RX ADMIN — GABAPENTIN 800 MG: 400 CAPSULE ORAL at 20:56

## 2018-01-01 RX ADMIN — METHOHEXITAL SODIUM 20 MG: 500 INJECTION, POWDER, LYOPHILIZED, FOR SOLUTION INTRAMUSCULAR; INTRAVENOUS; RECTAL at 09:42

## 2018-01-01 RX ADMIN — INSULIN DETEMIR 60 UNITS: 100 INJECTION, SOLUTION SUBCUTANEOUS at 11:11

## 2018-01-01 RX ADMIN — HYDROMORPHONE HYDROCHLORIDE 0.5 MG: 10 INJECTION INTRAMUSCULAR; INTRAVENOUS; SUBCUTANEOUS at 15:53

## 2018-01-01 RX ADMIN — ASPIRIN 325 MG ORAL TABLET 325 MG: 325 PILL ORAL at 10:56

## 2018-01-01 RX ADMIN — PAROXETINE HYDROCHLORIDE 40 MG: 20 TABLET, FILM COATED ORAL at 08:19

## 2018-01-01 RX ADMIN — DONEPEZIL HYDROCHLORIDE 5 MG: 5 TABLET ORAL at 20:42

## 2018-01-01 RX ADMIN — LEVOTHYROXINE SODIUM 50 MCG: 50 TABLET ORAL at 05:17

## 2018-01-01 RX ADMIN — TAMSULOSIN HYDROCHLORIDE 0.4 MG: 0.4 CAPSULE ORAL at 09:02

## 2018-01-01 RX ADMIN — Medication 325 MG: at 08:19

## 2018-01-01 RX ADMIN — GABAPENTIN 800 MG: 400 CAPSULE ORAL at 08:19

## 2018-01-01 RX ADMIN — HYDROCODONE BITARTRATE AND ACETAMINOPHEN 1 TABLET: 7.5; 325 TABLET ORAL at 09:02

## 2018-01-01 RX ADMIN — GABAPENTIN 800 MG: 400 CAPSULE ORAL at 11:22

## 2018-01-01 RX ADMIN — ASPIRIN 325 MG: 325 TABLET, DELAYED RELEASE ORAL at 09:02

## 2018-01-01 RX ADMIN — Medication 325 MG: at 09:02

## 2018-01-01 RX ADMIN — INSULIN LISPRO 10 UNITS: 100 INJECTION, SOLUTION INTRAVENOUS; SUBCUTANEOUS at 17:14

## 2018-01-01 RX ADMIN — PROPOFOL 100 MG: 10 INJECTION, EMULSION INTRAVENOUS at 07:30

## 2018-01-01 RX ADMIN — GABAPENTIN 800 MG: 400 CAPSULE ORAL at 13:02

## 2018-01-01 RX ADMIN — TAMSULOSIN HYDROCHLORIDE 0.4 MG: 0.4 CAPSULE ORAL at 10:56

## 2018-01-01 RX ADMIN — PANTOPRAZOLE SODIUM 40 MG: 40 TABLET, DELAYED RELEASE ORAL at 09:02

## 2018-01-01 RX ADMIN — METHOHEXITAL SODIUM 20 MG: 500 INJECTION, POWDER, LYOPHILIZED, FOR SOLUTION INTRAMUSCULAR; INTRAVENOUS; RECTAL at 09:33

## 2018-01-01 RX ADMIN — MIDAZOLAM HYDROCHLORIDE 2 MG: 1 INJECTION, SOLUTION INTRAMUSCULAR; INTRAVENOUS at 09:39

## 2018-01-01 RX ADMIN — METOPROLOL TARTRATE 12.5 MG: 25 TABLET, FILM COATED ORAL at 12:41

## 2018-01-01 RX ADMIN — PAROXETINE HYDROCHLORIDE 40 MG: 20 TABLET, FILM COATED ORAL at 10:57

## 2018-01-01 RX ADMIN — ROSUVASTATIN CALCIUM 20 MG: 20 TABLET, FILM COATED ORAL at 20:57

## 2018-01-01 RX ADMIN — PROTAMINE SULFATE 50 MG: 10 INJECTION, SOLUTION INTRAVENOUS at 08:33

## 2018-01-01 RX ADMIN — INSULIN LISPRO 10 UNITS: 100 INJECTION, SOLUTION INTRAVENOUS; SUBCUTANEOUS at 08:23

## 2018-01-01 RX ADMIN — NICOTINE 1 PATCH: 21 PATCH, EXTENDED RELEASE TRANSDERMAL at 10:55

## 2018-01-01 RX ADMIN — POTASSIUM CHLORIDE 10 MEQ: 750 CAPSULE, EXTENDED RELEASE ORAL at 09:02

## 2018-01-01 RX ADMIN — GABAPENTIN 800 MG: 400 CAPSULE ORAL at 12:41

## 2018-01-01 RX ADMIN — INSULIN LISPRO 15 UNITS: 100 INJECTION, SOLUTION INTRAVENOUS; SUBCUTANEOUS at 13:02

## 2018-01-01 RX ADMIN — BUDESONIDE AND FORMOTEROL FUMARATE DIHYDRATE 2 PUFF: 80; 4.5 AEROSOL RESPIRATORY (INHALATION) at 20:59

## 2018-01-01 RX ADMIN — GABAPENTIN 800 MG: 400 CAPSULE ORAL at 09:02

## 2018-01-01 RX ADMIN — FENTANYL CITRATE 250 MCG: 50 INJECTION, SOLUTION INTRAMUSCULAR; INTRAVENOUS at 07:24

## 2018-01-01 RX ADMIN — INSULIN LISPRO 10 UNITS: 100 INJECTION, SOLUTION INTRAVENOUS; SUBCUTANEOUS at 12:41

## 2018-01-01 RX ADMIN — INSULIN DETEMIR 60 UNITS: 100 INJECTION, SOLUTION SUBCUTANEOUS at 08:20

## 2018-01-01 RX ADMIN — CYCLOBENZAPRINE HYDROCHLORIDE 10 MG: 10 TABLET, FILM COATED ORAL at 20:57

## 2018-01-01 RX ADMIN — FAMOTIDINE 20 MG: 20 TABLET, FILM COATED ORAL at 06:20

## 2018-01-01 RX ADMIN — LIDOCAINE HYDROCHLORIDE 0.5 ML: 10 INJECTION, SOLUTION EPIDURAL; INFILTRATION; INTRACAUDAL; PERINEURAL at 06:21

## 2018-01-01 RX ADMIN — ETOMIDATE 31.84 MG: 2 INJECTION, SOLUTION INTRAVENOUS at 07:24

## 2018-01-01 RX ADMIN — HYDROMORPHONE HYDROCHLORIDE 0.5 MG: 10 INJECTION INTRAMUSCULAR; INTRAVENOUS; SUBCUTANEOUS at 09:29

## 2018-01-01 RX ADMIN — LEVOTHYROXINE SODIUM 50 MCG: 50 TABLET ORAL at 10:56

## 2018-01-01 RX ADMIN — BUDESONIDE AND FORMOTEROL FUMARATE DIHYDRATE 2 PUFF: 80; 4.5 AEROSOL RESPIRATORY (INHALATION) at 10:01

## 2018-01-01 RX ADMIN — SODIUM CHLORIDE 5 UNITS/HR: 9 INJECTION, SOLUTION INTRAVENOUS at 07:46

## 2018-01-01 RX ADMIN — Medication 325 MG: at 11:22

## 2018-01-01 RX ADMIN — LEVOTHYROXINE SODIUM 50 MCG: 50 TABLET ORAL at 06:55

## 2018-01-01 RX ADMIN — Medication 10 ML: at 20:41

## 2018-01-01 RX ADMIN — CLONAZEPAM 0.5 MG: 0.5 TABLET ORAL at 20:57

## 2018-01-01 RX ADMIN — TAMSULOSIN HYDROCHLORIDE 0.4 MG: 0.4 CAPSULE ORAL at 08:18

## 2018-01-01 RX ADMIN — MIDAZOLAM HYDROCHLORIDE 2 MG: 1 INJECTION, SOLUTION INTRAMUSCULAR; INTRAVENOUS at 09:28

## 2018-01-01 RX ADMIN — HYDROCODONE BITARTRATE AND ACETAMINOPHEN 1 TABLET: 7.5; 325 TABLET ORAL at 14:48

## 2018-01-01 RX ADMIN — POTASSIUM CHLORIDE AND DEXTROSE MONOHYDRATE 30 ML/HR: 150; 5 INJECTION, SOLUTION INTRAVENOUS at 09:30

## 2018-01-01 RX ADMIN — HYDROMORPHONE HYDROCHLORIDE 0.5 MG: 10 INJECTION INTRAMUSCULAR; INTRAVENOUS; SUBCUTANEOUS at 12:13

## 2018-01-01 RX ADMIN — GABAPENTIN 800 MG: 400 CAPSULE ORAL at 17:53

## 2018-01-01 RX ADMIN — PANTOPRAZOLE SODIUM 40 MG: 40 TABLET, DELAYED RELEASE ORAL at 08:19

## 2018-01-01 RX ADMIN — CLONAZEPAM 0.5 MG: 0.5 TABLET ORAL at 20:43

## 2018-01-01 RX ADMIN — DONEPEZIL HYDROCHLORIDE 5 MG: 5 TABLET ORAL at 20:57

## 2018-01-01 RX ADMIN — INSULIN DETEMIR 60 UNITS: 100 INJECTION, SOLUTION SUBCUTANEOUS at 21:15

## 2018-01-01 RX ADMIN — ROCURONIUM BROMIDE 50 MG: 10 SOLUTION INTRAVENOUS at 07:24

## 2018-01-01 RX ADMIN — INSULIN LISPRO 12 UNITS: 100 INJECTION, SOLUTION INTRAVENOUS; SUBCUTANEOUS at 17:14

## 2018-01-01 RX ADMIN — CEFAZOLIN SODIUM 2 G: 2 INJECTION, SOLUTION INTRAVENOUS at 07:48

## 2018-01-01 RX ADMIN — MIDAZOLAM HYDROCHLORIDE 2 MG: 1 INJECTION, SOLUTION INTRAMUSCULAR; INTRAVENOUS at 09:30

## 2018-01-01 RX ADMIN — SODIUM CHLORIDE, POTASSIUM CHLORIDE, SODIUM LACTATE AND CALCIUM CHLORIDE 9 ML/HR: 600; 310; 30; 20 INJECTION, SOLUTION INTRAVENOUS at 06:21

## 2018-01-01 RX ADMIN — BUDESONIDE AND FORMOTEROL FUMARATE DIHYDRATE 2 PUFF: 80; 4.5 AEROSOL RESPIRATORY (INHALATION) at 07:21

## 2018-01-01 RX ADMIN — SODIUM CHLORIDE: 9 INJECTION, SOLUTION INTRAVENOUS at 06:57

## 2018-01-01 RX ADMIN — METHOHEXITAL SODIUM 20 MG: 500 INJECTION, POWDER, LYOPHILIZED, FOR SOLUTION INTRAMUSCULAR; INTRAVENOUS; RECTAL at 09:44

## 2018-01-01 RX ADMIN — METHOHEXITAL SODIUM 20 MG: 500 INJECTION, POWDER, LYOPHILIZED, FOR SOLUTION INTRAMUSCULAR; INTRAVENOUS; RECTAL at 09:36

## 2018-01-01 RX ADMIN — METOPROLOL TARTRATE 12.5 MG: 25 TABLET, FILM COATED ORAL at 20:43

## 2018-01-01 RX ADMIN — CLOPIDOGREL BISULFATE 75 MG: 75 TABLET ORAL at 09:02

## 2018-01-01 RX ADMIN — PAROXETINE HYDROCHLORIDE 40 MG: 20 TABLET, FILM COATED ORAL at 09:02

## 2018-01-01 RX ADMIN — IOPAMIDOL 85 ML: 755 INJECTION, SOLUTION INTRAVENOUS at 09:38

## 2018-01-01 RX ADMIN — HYDROCODONE BITARTRATE AND ACETAMINOPHEN 1 TABLET: 7.5; 325 TABLET ORAL at 08:38

## 2018-01-01 RX ADMIN — NICOTINE 1 PATCH: 21 PATCH, EXTENDED RELEASE TRANSDERMAL at 12:42

## 2018-01-01 RX ADMIN — ASPIRIN 325 MG: 325 TABLET, DELAYED RELEASE ORAL at 08:19

## 2018-01-01 RX ADMIN — FENTANYL CITRATE 50 MCG: 50 INJECTION, SOLUTION INTRAMUSCULAR; INTRAVENOUS at 09:30

## 2018-01-01 RX ADMIN — MAGNESIUM SULFATE IN WATER 4 G: 40 INJECTION, SOLUTION INTRAVENOUS at 14:39

## 2018-01-01 RX ADMIN — INSULIN LISPRO 12 UNITS: 100 INJECTION, SOLUTION INTRAVENOUS; SUBCUTANEOUS at 21:14

## 2018-01-01 RX ADMIN — FENTANYL CITRATE 50 MCG: 50 INJECTION, SOLUTION INTRAMUSCULAR; INTRAVENOUS at 09:28

## 2018-01-01 RX ADMIN — ALPRAZOLAM 0.5 MG: 0.5 TABLET ORAL at 08:31

## 2018-01-01 RX ADMIN — POTASSIUM CHLORIDE AND SODIUM CHLORIDE 50 ML/HR: 450; 150 INJECTION, SOLUTION INTRAVENOUS at 14:38

## 2018-01-01 RX ADMIN — AMITRIPTYLINE HYDROCHLORIDE 25 MG: 25 TABLET, FILM COATED ORAL at 20:57

## 2018-01-01 RX ADMIN — PANTOPRAZOLE SODIUM 40 MG: 40 TABLET, DELAYED RELEASE ORAL at 10:57

## 2018-01-01 RX ADMIN — INSULIN LISPRO 16 UNITS: 100 INJECTION, SOLUTION INTRAVENOUS; SUBCUTANEOUS at 13:02

## 2018-01-01 RX ADMIN — CHLORHEXIDINE GLUCONATE 15 ML: 1.2 RINSE ORAL at 06:20

## 2018-01-01 RX ADMIN — MUPIROCIN 1 APPLICATION: 20 OINTMENT TOPICAL at 06:19

## 2018-01-01 RX ADMIN — HEPARIN SODIUM 20000 UNITS: 1000 INJECTION, SOLUTION INTRAVENOUS; SUBCUTANEOUS at 08:04

## 2018-01-01 RX ADMIN — HYDROCODONE BITARTRATE AND ACETAMINOPHEN 1 TABLET: 7.5; 325 TABLET ORAL at 10:33

## 2018-01-01 RX ADMIN — MIDAZOLAM HYDROCHLORIDE 2 MG: 1 INJECTION, SOLUTION INTRAMUSCULAR; INTRAVENOUS at 06:35

## 2018-01-01 RX ADMIN — Medication 2 TABLET: at 20:42

## 2018-01-01 RX ADMIN — SODIUM CHLORIDE 3 ML/KG/HR: 9 INJECTION, SOLUTION INTRAVENOUS at 11:47

## 2018-01-01 RX ADMIN — Medication 2 TABLET: at 21:30

## 2018-01-01 RX ADMIN — GABAPENTIN 800 MG: 400 CAPSULE ORAL at 20:42

## 2018-01-01 RX ADMIN — ROSUVASTATIN CALCIUM 20 MG: 20 TABLET, FILM COATED ORAL at 20:42

## 2018-01-01 RX ADMIN — METOPROLOL TARTRATE 50 MG: 50 TABLET ORAL at 08:32

## 2018-01-01 RX ADMIN — LIDOCAINE HYDROCHLORIDE 50 MG: 10 INJECTION, SOLUTION EPIDURAL; INFILTRATION; INTRACAUDAL; PERINEURAL at 07:24

## 2018-01-01 RX ADMIN — SODIUM CHLORIDE 13.6 UNITS/HR: 9 INJECTION, SOLUTION INTRAVENOUS at 18:25

## 2018-01-01 RX ADMIN — HYDROCODONE BITARTRATE AND ACETAMINOPHEN 1 TABLET: 5; 325 TABLET ORAL at 17:06

## 2018-01-01 RX ADMIN — HYDROCODONE BITARTRATE AND ACETAMINOPHEN 1 TABLET: 7.5; 325 TABLET ORAL at 18:24

## 2018-01-01 RX ADMIN — GABAPENTIN 800 MG: 400 CAPSULE ORAL at 17:14

## 2018-01-04 NOTE — TELEPHONE ENCOUNTER
----- Message from HERNÁN Sahu sent at 1/4/2018 12:58 PM EST -----  Please let patient know that his aortic stenosis is now severe. He does need evaluation for possible TAVR. Please refer him for this.    Called and left VM for patient to call office to discuss echo results.

## 2018-01-05 NOTE — TELEPHONE ENCOUNTER
----- Message from HERNÁN Sahu sent at 1/4/2018 12:58 PM EST -----  Please let patient know that his aortic stenosis is now severe. He does need evaluation for possible TAVR. Please refer him for this.    Patient returned call from 1/4, advised of results of echo and that he needs to be referred for TAVR.  He verbalized understanding.   Advised will have  call him to set up appt.

## 2018-01-10 NOTE — TELEPHONE ENCOUNTER
----- Message from Di Fernández sent at 1/10/2018 12:13 PM EST -----  Contact: patient  Pt has a missed call and thought you may have called him.      Spoke with patient who had questions about upcoming TAVR evaluation. Discussed TAVR surgery briefly and pre surgery work-up

## 2018-01-10 NOTE — TELEPHONE ENCOUNTER
----- Message from Hedy Perez sent at 1/10/2018 11:46 AM EST -----  Regarding: FW: Patient call  Contact: [phone]  Nicolasa,  Please see message bellow:  ----- Message -----     From: Geneva Galvez     Sent: 1/10/2018  11:22 AM       To: Hedy Perez  Subject: Patient call                                     Patient wants to ask Nicolasa question asap about valve surgery.     12:06 pm left voicemail for patient

## 2018-01-12 NOTE — PROGRESS NOTES
Outpatient TAVR Evaluation    Ariel Ugalde 1950      01/12/2017    PCP: HERNÁN Easley  Primary Cardiologist: Fer Hsu MD    TAVR Team:  1.  Fer Gould MD  2.  Pj Duque MD  3.  Shaneka Bliss MD  4.  Margaret Kebede MD    Chief Complaint: Follow-up (TAVR Evaluation)      Identification: This is a 67 y.o. year old male from [place], [place].    History of Present Illness: Mr. Ugalde was referred for consideration of TAVR per Dr. Hsu following his most recent echocardiogram on 1/3/18 and progressive symptoms of MEDINA, generalized weakness, dizziness, and syncopal episodes.  His aortic valve indices are as follows:  JULIANNA 0.59 cm2, Aortic valve Vmax 4.24 m/sec, and AV mean gradient 30 mm Hg, and LVEF 45%.  He follows closely with primary Cardiology and with the [facility].  His daughter relates two recent hospitalizations for pneumonia over the past three months and several visits to the Emergency Department.  Unfortunately, he continues to smoke.  He lives alone and his daughter plans to move back in once she graduates from Pharmacy School soon.      PROBLEM LIST:  1. Coronary artery disease:  a. CABG, 2000.  b. Redo CABG in 2009.  c. NSTEMI Cardiac catheterization, 11/17/2016: 95% ostial/proximal SVG to PDA treated with 3.0 x 15 Xience EES. 90% distal left main/ostial left circumflex treated with 3.5 x 12 Xience EES. Proximal LAD disease with patent CALVIN graft. Chronically occluded RCA. Large ectatic/aneurysmal saphenous vein graft to ramus branch. Diffuse disease, but nonflow limiting (50%). Elevated left ventricular end-diastolic pressures.  2. Aortic stenosis   a. Echocardiogram, January 2016, mild to moderate aortic sclerosis and mild mitral regurgitation with normal ejection fraction.  b. EF 50%. LV wall thickness is consistent with mild concentric hypertrophy. Mild to moderate aortic valve stenosis is present.  c. Transthoracic echocardiogram 1/3/2018 consistent with severe  aortic stenosis.  JULIANNA 0.59 cm2, AV vmax 4.24 m/sec, AV mean 30 mm Hg, and LVEF 45%.    3. Paroxysmal atrial fibrillation  a. Status post DAKOTAH and cardioversion, maintaining sinus rhythm by EKG 05/06/2014.   4. Chronic systolic congestive heart failure.  5. Hypertension.  6. Dyslipidemia.  7. COPD (chornic obstructive pulmonary disease).  a. CXR 5/30/17: Cardiomegaly and moderate pulmonary venous hypertension. No evidence of overt congestive failure.  8. Diabetes mellitus, type 2 (currently insulin dependent).  9. Peripheral neuropathy.  10. CKD (chornic kidney disease).  11. Chronic anemia.  12. Obesity.  13. Left lower extremity phlebitis.  14. Chronic lower extremity edema.  15. Remote left arm fracture, status post surgical revision in 1996.  16. History of osteoarthritis.  17. History of occipital stroke 2014.  Follows with Dr. Hernandes  18. History of sleep apnea.  19. History of depression/anxiety.  20. Ongoing tobacco use/chronic obstructive pulmonary disease.  21. Other remote surgical history.  a. Remote bowel surgery.         Past Surgical History:  Past Surgical History:   Procedure Laterality Date   • CARDIAC CATHETERIZATION N/A 11/17/2016    Procedure: Left Heart Cath;  Surgeon: Fer Hsu MD;  Location: [facility]ATH [facility];  Service:    • CORONARY ANGIOPLASTY WITH STENT PLACEMENT  2012   • CORONARY ARTERY BYPASS GRAFT  2009   • ORIF FOREARM FRACTURE Left 1996   • SMALL INTESTINE SURGERY  1950   • UPPER GASTROINTESTINAL ENDOSCOPY  2014       Allergies:  Prednisone    Social History:  Social History     Social History   • Marital status: /      Spouse name: Karlie   • Number of children: 6   • Years of education: H.S.     Occupational History   •  Retired     Social History Main Topics   • Smoking status: Current Every Day Smoker     Packs/day: 1.50     Years: 50.00     Types: Cigarettes   • Smokeless tobacco: Never Used   • Alcohol use No   • Drug use: No   • Sexual  activity: Not Currently     Partners: Female      Comment:      Other Topics Concern   • Not on file     Social History Narrative    Caffeine: 3-6 servings per day    Patient lives at home with his wife.       Family History:  Family History   Problem Relation Age of Onset   • Diabetes Mother    • Hypertension Mother    • Heart disease Mother    • Cancer Mother    • Stroke Father    • Diabetes Father    • Hypertension Father    • Heart disease Father    • Cancer Father    • Anxiety disorder Sister    • Alcohol abuse Brother    • Stroke Brother        Current Medications:    Current Outpatient Prescriptions:   •  amitriptyline (ELAVIL) 25 MG tablet, Take 25 mg by mouth At Night As Needed for Sleep., Disp: , Rfl:   •  aspirin 81 MG EC tablet, Take 1 tablet by mouth Daily., Disp: 90 tablet, Rfl: 4  •  bumetanide (BUMEX) 1 MG tablet, Take 1 tablet by mouth Every 3 (Three) Days. 2 tablets every 3 days and 3 tablets the next 3 days and repeat, Disp: 180 tablet, Rfl: 4  •  carvedilol (COREG) 12.5 MG tablet, Take 1 tablet by mouth 2 (Two) Times a Day., Disp: 60 tablet, Rfl: 11  •  clonazePAM (KlonoPIN) 0.5 MG tablet, Take 0.5 mg by mouth Every Night., Disp: , Rfl:   •  clopidogrel (PLAVIX) 75 MG tablet, Take 1 tablet by mouth Daily., Disp: 30 tablet, Rfl: 12  •  cyclobenzaprine (FLEXERIL) 10 MG tablet, Take 10 mg by mouth At Night As Needed for Muscle Spasms., Disp: , Rfl:   •  donepezil (ARICEPT) 5 MG tablet, Take 5 mg by mouth Every Night., Disp: , Rfl:   •  ferrous sulfate 325 (65 FE) MG tablet, Take 1 tablet by mouth Daily With Breakfast., Disp: 30 tablet, Rfl: 5  •  fluconazole (DIFLUCAN) 150 MG tablet, Take one pill today and then may repeat x 1 dose in 7 days, Disp: 2 tablet, Rfl: 0  •  gabapentin (NEURONTIN) 800 MG tablet, Take 800 mg by mouth 4 (Four) Times a Day., Disp: , Rfl:   •  HYDROcodone-acetaminophen (NORCO)  MG per tablet, Take 1 tablet by mouth Every 4 (Four) Hours As Needed for moderate pain  (4-6)., Disp: , Rfl:   •  insulin aspart (novoLOG) 100 UNIT/ML injection, Inject 10 Units under the skin 3 (Three) Times a Day Before Meals., Disp: , Rfl:   •  insulin glargine (LANTUS) 100 UNIT/ML injection, Inject 20 Units under the skin 2 (Two) Times a Day. Advance dose as diet advances (Patient taking differently: Inject 60 Units under the skin 2 (Two) Times a Day. Advance dose as diet advances), Disp: , Rfl: 12  •  isosorbide mononitrate (IMDUR) 120 MG 24 hr tablet, Take 1 tablet by mouth Daily., Disp: 90 tablet, Rfl: 4  •  levothyroxine (SYNTHROID, LEVOTHROID) 50 MCG tablet, Take 50 mcg by mouth Daily., Disp: , Rfl:   •  Magnesium 400 MG capsule, Take 400 mg by mouth 2 (Two) Times a Day., Disp: 180 capsule, Rfl: 3  •  metOLazone (ZAROXOLYN) 5 MG tablet, Take 1 tablet by mouth 3 (Three) Times a Week., Disp: 15 tablet, Rfl: 4  •  NIACIN ER PO, Take 500 mg by mouth Daily., Disp: , Rfl:   •  nitroglycerin (NITROSTAT) 0.4 MG SL tablet, Place 1 tablet under the tongue Every 5 (Five) Minutes As Needed for Chest Pain. Take no more than 3 doses in 15 minutes., Disp: 25 tablet, Rfl: 5  •  ondansetron ODT (ZOFRAN-ODT) 4 MG disintegrating tablet, Take 4 mg by mouth Every 8 (Eight) Hours As Needed for Nausea or Vomiting., Disp: , Rfl:   •  pantoprazole (PROTONIX) 40 MG EC tablet, Take 40 mg by mouth Daily., Disp: , Rfl:   •  PARoxetine (PAXIL) 40 MG tablet, Take 40 mg by mouth Every Morning., Disp: , Rfl:   •  potassium chloride (K-DUR,KLOR-CON) 20 MEQ CR tablet, Take 1 tablet by mouth 2 (Two) Times a Day., Disp: 60 tablet, Rfl: 3  •  rosuvastatin (CRESTOR) 20 MG tablet, Take 1 tablet by mouth Daily., Disp: 90 tablet, Rfl: 4  •  tamsulosin (FLOMAX) 0.4 MG capsule 24 hr capsule, Take 1 capsule by mouth Daily. Take 1/2 hour following the same meal every day, Disp: , Rfl:   •  vitamin C (ASCORBIC ACID) 500 MG tablet, Take 1 tablet by mouth Daily., Disp: 30 tablet, Rfl: 5    Review of Systems:  Review of Systems    Constitution: Positive for weakness, malaise/fatigue and weight gain. Negative for chills, decreased appetite, diaphoresis, fever, night sweats and weight loss.   HENT: Positive for congestion. Negative for hearing loss, hoarse voice and nosebleeds.    Eyes: Negative for blurred vision, visual disturbance and visual halos.   Cardiovascular: Positive for claudication, dyspnea on exertion, leg swelling, near-syncope and orthopnea. Negative for chest pain, cyanosis, irregular heartbeat, palpitations, paroxysmal nocturnal dyspnea and syncope.   Respiratory: Positive for snoring and wheezing. Negative for cough, hemoptysis, shortness of breath, sleep disturbances due to breathing and sputum production.    Endocrine: Positive for polydipsia.   Hematologic/Lymphatic: Negative for bleeding problem. Bruises/bleeds easily.   Skin: Negative for dry skin, itching and rash.   Musculoskeletal: Positive for joint pain, muscle cramps and muscle weakness. Negative for arthritis, joint swelling and myalgias.   Gastrointestinal: Positive for bloating and nausea. Negative for abdominal pain, constipation, diarrhea, flatus, heartburn, hematemesis, hematochezia, melena and vomiting.   Genitourinary: Negative for dysuria, frequency, hematuria, nocturia and urgency.   Neurological: Positive for dizziness, light-headedness and loss of balance. Negative for excessive daytime sleepiness and headaches.   Psychiatric/Behavioral: Positive for altered mental status and memory loss. Negative for depression. The patient is nervous/anxious. The patient does not have insomnia.    Allergic/Immunologic:        Seasonal allergies          Physical Exam:  Physical Exam   Constitutional: He is oriented to person, place, and time. He appears well-developed. No distress.   Accompanied by daughter.  Appears older than stated age.   HENT:   Head: Normocephalic and atraumatic.   Multiple missing teeth.  Remaining teeth appear to have multiple dental caries    Eyes: Pupils are equal, round, and reactive to light. No scleral icterus.   Neck: Neck supple. No JVD present. No tracheal deviation present. No thyromegaly present.   Cardiovascular: Normal rate and regular rhythm.    Murmur heard.  Harsh 3/6 systolic murmur heard best @ LUSB.  No carotid bruit.  Diminished pedal pulses +1/4 bilaterally   Pulmonary/Chest: Effort normal. No respiratory distress. He has wheezes. He has no rales. He exhibits no tenderness.   Expiratory coarse wheezing and scattered rhonchi   Abdominal: Soft. Bowel sounds are normal. There is no tenderness.   Abdomen sl. protuberant   Musculoskeletal: Normal range of motion. He exhibits edema.   Trace pedal and ankle edema bilaterlly   Lymphadenopathy:     He has no cervical adenopathy.   Neurological: He is alert and oriented to person, place, and time. No cranial nerve deficit.   Skin: Skin is warm and dry.   Psychiatric: He has a normal mood and affect. His behavior is normal.       Diagnostic Data:  Transthoracic echo: as noted above in HPI    Transesophageal echo: pending    Cardiac Cath: to be scheduled    CTA Chest, Abdomen, and Pelvis: ordered    Carotid doppler: to be scheduled    [facility]: 28/70 (moderate to severe symptoms)    BROWN Index of [place] with ADL's: 6/6    [place] A Instrumental ADL scale:  8/8    Mean  strength with hydraulic hand dynamometer:  37kg (mean for age and gender 26-57 kg)    5 Meter Walk Test:  Walk #1 was 7.5 seconds  Walk #2 was 7.6 seconds  Walk #3 was 7.9 seconds     Average Gait Speed of 7.73 Seconds/ 5 meters which is slow gait speed and therefore indicates frailty.      STS risk of mortality with open AVR:  8.2%    Assessment:   1.  Severe symptomatic aortic stenosis  2.  Chronic systolic heart failure:  NYHA class III  3.  CAD s/p CABG 2000, re-do 2009, then NSTEMI and PCI 11/2016  4.  Poor dentition  5.  Ongoing tobacco abuse with COPD  6.  Type 2 DM with peripheral neuropathy  7.   CKD stage III  8.  Paroxysmal atrial fibrillation.  CHADS Vasc = 4 but not anticoagulated per Cardiology.  9.  HTN  10.  Hyperlipidemia  11.  Hx of occipital stroke 2014.  Follows with Neruology (C[facility])      Plan:   TAVR procedure and pre-requisite evaluation process reviewed with patient.  Utilized pamphlets and procedural models.  Will arrange trips for testing in a consolidated fashion as best possible due to daughter's residence currently in [place].  He will need Oral Surgery intervention and CT Surgery consultations as well.  Daughter requested I arrange the Oral Surgery Consult/ Dr. Iftikhar Freire.  Update PFT's, too.  Anticipated TAVR procedure date if all agree tentatively 2/13/18.

## 2018-01-16 PROBLEM — I50.22 CHRONIC SYSTOLIC HEART FAILURE (HCC): Status: ACTIVE | Noted: 2017-01-01

## 2018-01-17 PROBLEM — R55 NEAR SYNCOPE: Status: ACTIVE | Noted: 2018-01-01

## 2018-01-17 PROBLEM — I25.10 CORONARY ARTERY DISEASE INVOLVING NATIVE CORONARY ARTERY OF NATIVE HEART WITHOUT ANGINA PECTORIS: Status: ACTIVE | Noted: 2018-01-01

## 2018-01-23 NOTE — H&P
[facility] at [facility]   History and physical        Patient Care Team:  HERNÁN Mchugh as PCP - General (Nurse Practitioner)     PROBLEM LIST:  1. Coronary artery disease:  a. CABG, 2000.  b. Redo CABG in 2009.  c. NSTEMI Cardiac catheterization, 11/17/2016: 95% ostial/proximal SVG to PDA treated with 3.0 x 15 Xience EES. 90% distal left main/ostial left circumflex treated with 3.5 x 12 Xience EES. Proximal LAD disease with patent CALVIN graft. Chronically occluded RCA. Large ectatic/aneurysmal saphenous vein graft to ramus branch. Diffuse disease, but nonflow limiting (50%). Elevated left ventricular end-diastolic pressures.  2. Aortic stenosis   a. Echocardiogram, January 2016, mild to moderate aortic sclerosis and mild mitral regurgitation with normal ejection fraction.  b. EF 50%. LV wall thickness is consistent with mild concentric hypertrophy. Mild to moderate aortic valve stenosis is present.  c. 1/3/18 echocardiogram EF of 45%.  Severe aortic valve stenosis with maximum pressure gradient of 71.9 mmHg.  3. Paroxysmal atrial fibrillation  a. Status post DAKOTAH and cardioversion, maintaining sinus rhythm by EKG 05/06/2014.   4. Chronic systolic congestive heart failure.  5. Hypertension.  6. Dyslipidemia.  7. COPD (chornic obstructive pulmonary disease).  a. CXR 5/30/17: Cardiomegaly and moderate pulmonary venous hypertension. No evidence of overt congestive failure.  8. Diabetes mellitus, type 2 (currently insulin dependent).  9. Peripheral neuropathy.  10. CKD (chornic kidney disease).  11. Chronic anemia.  12. Obesity.  13. Left lower extremity phlebitis.  14. Chronic lower extremity edema.  15. Remote left arm fracture, status post surgical revision in 1996.  16. History of osteoarthritis.  17. History of vertigo.  18. History of sleep apnea.  19. History of depression/anxiety.  20. Ongoing tobacco use/chronic obstructive pulmonary disease.  21. Other remote surgical history.  a. Remote bowel  surgery.        Allergies   Allergen Reactions   • Prednisone Other (See Comments)     Chest pain           Current Facility-Administered Medications:   •  acetaminophen (TYLENOL) tablet 650 mg, 650 mg, Oral, Q4H PRN, HERNÁN Sahu  •  [COMPLETED] aspirin tablet 325 mg, 325 mg, Oral, Once, 325 mg at 01/23/18 0812 **AND** [START ON 1/24/2018] aspirin EC tablet 325 mg, 325 mg, Oral, Daily, HERNÁN Sahu  •  nitroglycerin (NITROSTAT) SL tablet 0.4 mg, 0.4 mg, Sublingual, Q5 Min PRN, HERNÁN Sahu  •  ondansetron (ZOFRAN) injection 4 mg, 4 mg, Intravenous, Q6H PRN, HERNÁN Sahu  •  sodium chloride 0.9 % flush 1-10 mL, 1-10 mL, Intravenous, PRN, Yoli Plata APRN  •  sodium chloride 0.9 % infusion, 1-3 mL/kg/hr, Intravenous, Continuous, Fer Hsu MD      sodium chloride 1-3 mL/kg/hr       Prescriptions Prior to Admission   Medication Sig Dispense Refill Last Dose   • amitriptyline (ELAVIL) 25 MG tablet Take 25 mg by mouth At Night As Needed for Sleep.   1/22/2018 at 2100   • aspirin 81 MG EC tablet Take 1 tablet by mouth Daily. 90 tablet 4 1/22/2018 at 2100   • bumetanide (BUMEX) 1 MG tablet Take 1 tablet by mouth Every 3 (Three) Days. 2 tablets every 3 days and 3 tablets the next 3 days and repeat 180 tablet 4 1/22/2018 at 0600   • carvedilol (COREG) 12.5 MG tablet Take 1 tablet by mouth 2 (Two) Times a Day. 60 tablet 11 1/23/2018 at 0600   • clonazePAM (KlonoPIN) 0.5 MG tablet Take 0.5 mg by mouth Every Night.   1/22/2018 at 2100   • clopidogrel (PLAVIX) 75 MG tablet Take 1 tablet by mouth Daily. 30 tablet 12 1/22/2018 at 2100   • cyclobenzaprine (FLEXERIL) 10 MG tablet Take 10 mg by mouth At Night As Needed for Muscle Spasms.   1/22/2018 at 2100   • donepezil (ARICEPT) 5 MG tablet Take 5 mg by mouth Every Night.   1/22/2018 at 2100   • ferrous sulfate 325 (65 FE) MG tablet Take 1 tablet by mouth Daily With Breakfast. 30 tablet 5 1/22/2018 at 2100   • gabapentin  (NEURONTIN) 800 MG tablet Take 800 mg by mouth 4 (Four) Times a Day.   1/22/2018 at 2100   • HYDROcodone-acetaminophen (NORCO)  MG per tablet Take 1 tablet by mouth Every 4 (Four) Hours As Needed for moderate pain (4-6).   1/23/2018 at 0600   • insulin aspart (novoLOG) 100 UNIT/ML injection Inject 10 Units under the skin 3 (Three) Times a Day Before Meals.   1/22/2018 at 1700   • insulin glargine (LANTUS) 100 UNIT/ML injection Inject 20 Units under the skin 2 (Two) Times a Day. Advance dose as diet advances (Patient taking differently: Inject 60 Units under the skin 2 (Two) Times a Day. Advance dose as diet advances)  12 1/22/2018 at 1700   • isosorbide mononitrate (IMDUR) 120 MG 24 hr tablet Take 1 tablet by mouth Daily. 90 tablet 4 1/22/2018 at 2100   • levothyroxine (SYNTHROID, LEVOTHROID) 50 MCG tablet Take 50 mcg by mouth Daily.   1/23/2018 at 0600   • Magnesium 400 MG capsule Take 400 mg by mouth 2 (Two) Times a Day. 180 capsule 3 1/23/2018 at 0600   • metOLazone (ZAROXOLYN) 5 MG tablet Take 1 tablet by mouth 3 (Three) Times a Week. 15 tablet 4 Past Month at Unknown time   • NIACIN ER PO Take 500 mg by mouth Daily.   1/22/2018 at 2100   • pantoprazole (PROTONIX) 40 MG EC tablet Take 40 mg by mouth Daily.   Past Week at Unknown time   • PARoxetine (PAXIL) 40 MG tablet Take 40 mg by mouth Every Morning.   1/22/2018 at 2100   • potassium chloride (K-DUR,KLOR-CON) 20 MEQ CR tablet Take 1 tablet by mouth 2 (Two) Times a Day. 60 tablet 3 1/23/2018 at 0600   • rosuvastatin (CRESTOR) 20 MG tablet Take 1 tablet by mouth Daily. 90 tablet 4 1/22/2018 at 2100   • tamsulosin (FLOMAX) 0.4 MG capsule 24 hr capsule Take 1 capsule by mouth Daily. Take 1/2 hour following the same meal every day   1/22/2018 at 2100   • vitamin C (ASCORBIC ACID) 500 MG tablet Take 1 tablet by mouth Daily. 30 tablet 5 1/22/2018 at 2100   • vitamin D (ERGOCALCIFEROL) 36828 units capsule capsule Take 50,000 Units by mouth 1 (One) Time Per  Week.   1/19/2018 at 0600   • nitroglycerin (NITROSTAT) 0.4 MG SL tablet Place 1 tablet under the tongue Every 5 (Five) Minutes As Needed for Chest Pain. Take no more than 3 doses in 15 minutes. 25 tablet 5 Unknown at Unknown time         Subjective .   History of present illness:    Patient is a 67-year-old  male who presents today for DAKOTAH and cardiac catheterization and evaluation for transcutaneous aortic valve replacement evaluation.  He was last seen in the office on 12/18/17.  Patient noted at that time he had been experiencing episodes of syncope.  There was no noted chest pain, orthopnea, PND, or palpitations.  He noted that he generally felt weak.  An echocardiogram was performed on 1/3/18 which showed an EF of 45%.  There was severe aortic valve stenosis.  With a maximum pressure gradient of 71.9 mmHg.  Due to this he was referred for further evaluation for transcutaneous aortic valve replacement.  Since being seen in the office the patient has not had any further syncope.  He still complains of episodes of dizziness and weakness.  Also has continued shortness of breath.      Social History     Social History   • Marital status:      Spouse name: Karlie   • Number of children: 6   • Years of education: H.S.     Occupational History   •  Retired     Social History Main Topics   • Smoking status: Current Every Day Smoker     Packs/day: 1.50     Years: 50.00     Types: Cigarettes   • Smokeless tobacco: Never Used   • Alcohol use No   • Drug use: No   • Sexual activity: Not Currently     Partners: Female      Comment:      Other Topics Concern   • Not on file     Social History Narrative    Caffeine: 3-6 servings per day    Patient lives at home with his wife.     Family History   Problem Relation Age of Onset   • Diabetes Mother    • Hypertension Mother    • Heart disease Mother    • Cancer Mother    • Stroke Father    • Diabetes Father    • Hypertension Father    • Heart disease Father   "  • Cancer Father    • Anxiety disorder Sister    • Alcohol abuse Brother    • Stroke Brother          Review of Systems:  Review of Systems   Constitution: Positive for malaise/fatigue. Negative for fever and weakness.   HENT: Negative for nosebleeds.    Eyes: Negative for redness and visual disturbance.   Cardiovascular: Negative for orthopnea, palpitations and paroxysmal nocturnal dyspnea.   Respiratory: Positive for shortness of breath and wheezing. Negative for cough, snoring and sputum production.    Hematologic/Lymphatic: Positive for bleeding problem.   Skin: Negative for flushing, itching and rash.   Musculoskeletal: Negative for falls, joint pain and muscle cramps.   Gastrointestinal: Negative for abdominal pain, diarrhea, heartburn, nausea and vomiting.   Genitourinary: Negative for hematuria.   Neurological: Positive for dizziness. Negative for excessive daytime sleepiness, headaches and tremors.   Psychiatric/Behavioral: Negative for substance abuse. The patient is not nervous/anxious.           Objective   Vitals:  Blood pressure 114/91, pulse 72, temperature 96.9 °F (36.1 °C), temperature source Temporal Artery , resp. rate 18, height 180.3 cm (71\"), weight 107 kg (236 lb 8 oz), SpO2 94 %.       Physical Exam   Constitutional: He is oriented to person, place, and time. He appears well-developed and well-nourished. No distress.   HENT:   Head: Normocephalic and atraumatic.   Eyes: Right eye exhibits no discharge. Left eye exhibits no discharge.   Neck: No JVD present. Carotid bruit is present. No tracheal deviation present.   Cardiovascular: Normal rate and regular rhythm.  Exam reveals no friction rub.    Murmur heard.   Systolic murmur is present with a grade of 3/6  radiating to the neck  Pulmonary/Chest: Effort normal and breath sounds normal. No respiratory distress.   Abdominal: Soft. Bowel sounds are normal. There is no tenderness.   Musculoskeletal: He exhibits no edema or deformity. "   Neurological: He is alert and oriented to person, place, and time.   Skin: Skin is warm and dry.            Results Review:  I reviewed the patient's new clinical results.    Results from last 7 days  Lab Units 01/23/18  0736   WBC 10*3/mm3 6.23   HEMOGLOBIN g/dL 14.7   HEMATOCRIT % 42.9   PLATELETS 10*3/mm3 115*       Results from last 7 days  Lab Units 01/23/18  0736   SODIUM mmol/L 132   POTASSIUM mmol/L 4.7   CHLORIDE mmol/L 96*   CO2 mmol/L 27.0   BUN mg/dL 37*   CREATININE mg/dL 1.40*   CALCIUM mg/dL 9.9   BILIRUBIN mg/dL 0.5   ALK PHOS U/L 72   ALT (SGPT) U/L 17   AST (SGOT) U/L 20   GLUCOSE mg/dL 360*       Results from last 7 days  Lab Units 01/23/18  0736   SODIUM mmol/L 132   POTASSIUM mmol/L 4.7   CHLORIDE mmol/L 96*   CO2 mmol/L 27.0   BUN mg/dL 37*   CREATININE mg/dL 1.40*   GLUCOSE mg/dL 360*   CALCIUM mg/dL 9.9           Lab Results  Lab Value Date/Time   TROPONINI 0.074 (H) 07/15/2017 0536   TROPONINI 0.068 (H) 07/14/2017 2201   TROPONINI 0.053 (H) 07/14/2017 1448   TROPONINI 12.578 (C) 11/18/2016 0452   TROPONINI 0.29 12/03/2015 0434   TROPONINI 0.10 12/02/2015 1741   TROPONINI 1.56 (C) 11/17/2015 1921   TROPONINI 1.49 (C) 11/17/2015 1430   TROPONINI 8.08 (C) 11/16/2015 0830   TROPONINI 2.47 (C) 11/16/2015 0205   TROPONINI 0.14 11/15/2015 2205   TROPONINI 0.08 12/06/2014 0030   TROPONINI 0.09 12/05/2014 1643   TROPONINI 0.18 12/05/2014 0954   TROPONINI 0.16 12/05/2014 0711   TROPONINI 0.04 04/04/2014 1302   TROPONINI 0.60 03/25/2014 0306   TROPONINI 0.67 (C) 03/24/2014 2046   TROPONINI 0.80 (C) 03/24/2014 1431   TROPONINI 5.00 (C) 03/19/2014 0532   TROPONINI 3.87 (C) 03/18/2014 1548           Results from last 7 days  Lab Units 01/23/18  0736   CHOLESTEROL mg/dL 127   TRIGLYCERIDES mg/dL 473*   HDL CHOL mg/dL 32*             Assessment/Plan     1. Severe aortic stenosis. Maximum pressure gradient of 71.9 mmHg. Currently being evaluated for TAVR  2. CAD with previous CABG and  redo.  3. Hypertension  4. Dyslipidemia       Plan:    1. We'll proceed to transesophageal echocardiogram today with Dr. Bliss.  We'll also proceed with cardiac catheterization plus or minus catheter-based intervention with Dr. Hsu.  Further recommendations to follow the aforementioned testing.  Risk and benefits were discussed with the patient and family they verbalized understanding and wished proceed.      Yoli Plata, APRN  01/23/18  8:37 AM  I, Fer Hsu MD, personally performed the services described in this documentation as scribed by the above individual in my presence, and it is both accurate and complete    Dictated utilizing Dragon dictation

## 2018-01-24 NOTE — PLAN OF CARE
Problem: Patient Care Overview (Adult)  Goal: Plan of Care Review  Outcome: Outcome(s) achieved Date Met: 01/23/18 01/23/18 1928   Coping/Psychosocial Response Interventions   Plan Of Care Reviewed With patient   Patient Care Overview   Progress no change   Outcome Evaluation   Outcome Summary/Follow up Plan Patient's site stabe at time of discharge. The manisha is being discharged home with family.      Goal: Adult Individualization and Mutuality  Outcome: Outcome(s) achieved Date Met: 01/23/18    Goal: Discharge Needs Assessment  Outcome: Outcome(s) achieved Date Met: 01/23/18

## 2018-02-06 NOTE — PROGRESS NOTES
"02/06/2018  Patient Information  Ariel Ugalde                                                                                          [id number]   [facility] [address]   1950  'PCP/Referring Physician'  Opal Santana, APRN  [phone]  No ref. provider found    Chief Complaint   Patient presents with   • Shortness of Breath     Referred by DEBO Dawson for TAVR consult       History of Present Illness:  Mr. Ugalde is a 67 year old male with a history of hypertension, hyperlipidemia, diabetes, active tobacco abuse, chronic kidney disease, coronary artery disease status post redo CABG, atrial fibrillation and TIA who presents with shortness of breath.   The patient notes approximately 2-3 years of worsening shortness of breath with exertion.  Now he is only able to walk around 20 feet without significant dyspnea.  He denies chest pain, but has \"passed out\" and does this 1-2 times a month.    He denies ankle swelling unless standing for a long period of time on concrete floors.    Patient Active Problem List   Diagnosis   • Chronic systolic congestive heart failure   • SBO (small bowel obstruction)   • Nonrheumatic aortic valve stenosis   • CAD (coronary artery disease)   • Chronic low back pain   • Chronic narcotic use   • Chronic venous insufficiency   • Chronically on benzodiazepine therapy   • CKD (chronic kidney disease) stage 3, GFR 30-59 ml/min   • COPD (chronic obstructive pulmonary disease)   • DM2 (diabetes mellitus, type 2)   • CALVIN (generalized anxiety disorder)   • HLD (hyperlipidemia)   • Hypertension   • LARA (obstructive sleep apnea)   • Osteoarthritis of lumbar spine   • Oxygen dependent   • Porcelain gallbladder   • Tobacco dependence   • Partial small bowel obstruction   • Cardiac disease   • Migraine   • Stroke   • Arthritis   • Alcoholism   • Cancer   • Chronic systolic heart failure   • Encephalopathy   • Essential tremor   • Anxiety   • History of stroke   • Coronary artery disease " involving native coronary artery of native heart without angina pectoris   • Near syncope     Past Medical History:   Diagnosis Date   • Anxiety    • Aortic sclerosis 01/2016   • CAD (coronary artery disease)    • CHF (congestive heart failure)    • Chronic low back pain    • Chronic narcotic use    • Chronic venous insufficiency    • Chronically on benzodiazepine therapy    • CKD (chronic kidney disease) stage 3, GFR 30-59 ml/min    • Colonoscopy refused    • COPD (chronic obstructive pulmonary disease)    • Depression    • Disease of thyroid gland    • DM2 (diabetes mellitus, type 2)    • CALVIN (generalized anxiety disorder)    • GERD (gastroesophageal reflux disease)    • History of MI (myocardial infarction)    • HLD (hyperlipidemia)    • Hypertension    • Obesity    • LARA (obstructive sleep apnea)    • Osteoarthritis of lumbar spine    • Oxygen dependent    • Paroxysmal atrial fibrillation 05/06/2014   • Peripheral neuropathy    • Porcelain gallbladder    • Stroke    • TIA (transient ischemic attack)    • Tobacco dependence    • Tremor, essential      Past Surgical History:   Procedure Laterality Date   • CARDIAC CATHETERIZATION N/A 11/17/2016    Procedure: Left Heart Cath;  Surgeon: Fer Hsu MD;  Location:  [facility];  Service:    • CARDIAC CATHETERIZATION Left 1/23/2018    Procedure: Cardiac Catheterization/Vascular Study;  Surgeon: Fer Hsu MD;  Location:  [facility];  Service:    • CORONARY ANGIOPLASTY WITH STENT PLACEMENT  2012   • CORONARY ARTERY BYPASS GRAFT  2009   • ORIF FOREARM FRACTURE Left 1996   • SMALL INTESTINE SURGERY  1950   • UPPER GASTROINTESTINAL ENDOSCOPY  2014       Current Outpatient Prescriptions:   •  ADVAIR DISKUS 100-50 MCG/DOSE DISKUS, , Disp: , Rfl:   •  amitriptyline (ELAVIL) 25 MG tablet, Take 25 mg by mouth At Night As Needed for Sleep., Disp: , Rfl:   •  aspirin 81 MG EC tablet, Take 1 tablet by mouth Daily., Disp: 90 tablet, Rfl:  4  •  bumetanide (BUMEX) 1 MG tablet, Take 1 tablet by mouth Every 3 (Three) Days. 2 tablets every 3 days and 3 tablets the next 3 days and repeat, Disp: 180 tablet, Rfl: 4  •  carvedilol (COREG) 12.5 MG tablet, Take 1 tablet by mouth 2 (Two) Times a Day., Disp: 60 tablet, Rfl: 11  •  clonazePAM (KlonoPIN) 0.5 MG tablet, Take 0.5 mg by mouth Every Night., Disp: , Rfl:   •  clopidogrel (PLAVIX) 75 MG tablet, Take 1 tablet by mouth Daily., Disp: 30 tablet, Rfl: 12  •  cyclobenzaprine (FLEXERIL) 10 MG tablet, Take 10 mg by mouth At Night As Needed for Muscle Spasms., Disp: , Rfl:   •  donepezil (ARICEPT) 5 MG tablet, Take 5 mg by mouth Every Night., Disp: , Rfl:   •  ferrous sulfate 325 (65 FE) MG tablet, Take 1 tablet by mouth Daily With Breakfast., Disp: 30 tablet, Rfl: 5  •  gabapentin (NEURONTIN) 800 MG tablet, Take 800 mg by mouth 4 (Four) Times a Day., Disp: , Rfl:   •  HYDROcodone-acetaminophen (NORCO)  MG per tablet, Take 1 tablet by mouth Every 4 (Four) Hours As Needed for moderate pain (4-6)., Disp: , Rfl:   •  insulin aspart (novoLOG) 100 UNIT/ML injection, Inject 10 Units under the skin 3 (Three) Times a Day Before Meals., Disp: , Rfl:   •  insulin glargine (LANTUS) 100 UNIT/ML injection, Inject 20 Units under the skin 2 (Two) Times a Day. Advance dose as diet advances (Patient taking differently: Inject 60 Units under the skin 2 (Two) Times a Day. Advance dose as diet advances), Disp: , Rfl: 12  •  isosorbide mononitrate (IMDUR) 120 MG 24 hr tablet, Take 1 tablet by mouth Daily., Disp: 90 tablet, Rfl: 4  •  levothyroxine (SYNTHROID, LEVOTHROID) 50 MCG tablet, Take 50 mcg by mouth Daily., Disp: , Rfl:   •  Magnesium 400 MG capsule, Take 400 mg by mouth 2 (Two) Times a Day., Disp: 180 capsule, Rfl: 3  •  metOLazone (ZAROXOLYN) 5 MG tablet, Take 1 tablet by mouth 3 (Three) Times a Week., Disp: 15 tablet, Rfl: 4  •  NIACIN ER PO, Take 500 mg by mouth Daily., Disp: , Rfl:   •  nitroglycerin (NITROSTAT)  0.4 MG SL tablet, Place 1 tablet under the tongue Every 5 (Five) Minutes As Needed for Chest Pain. Take no more than 3 doses in 15 minutes., Disp: 25 tablet, Rfl: 5  •  pantoprazole (PROTONIX) 40 MG EC tablet, Take 40 mg by mouth Daily., Disp: , Rfl:   •  PARoxetine (PAXIL) 40 MG tablet, Take 40 mg by mouth Every Morning., Disp: , Rfl:   •  potassium chloride (K-DUR,KLOR-CON) 20 MEQ CR tablet, Take 1 tablet by mouth 2 (Two) Times a Day., Disp: 60 tablet, Rfl: 3  •  rosuvastatin (CRESTOR) 20 MG tablet, Take 1 tablet by mouth Daily., Disp: 90 tablet, Rfl: 4  •  tamsulosin (FLOMAX) 0.4 MG capsule 24 hr capsule, Take 1 capsule by mouth Daily. Take 1/2 hour following the same meal every day, Disp: , Rfl:   •  VENTOLIN  (90 Base) MCG/ACT inhaler, , Disp: , Rfl:   •  vitamin C (ASCORBIC ACID) 500 MG tablet, Take 1 tablet by mouth Daily., Disp: 30 tablet, Rfl: 5  •  vitamin D (ERGOCALCIFEROL) 99820 units capsule capsule, Take 50,000 Units by mouth 1 (One) Time Per Week., Disp: , Rfl:   No current facility-administered medications for this visit.     Facility-Administered Medications Ordered in Other Visits:   •  ALPRAZolam (XANAX) tablet 0.5 mg, 0.5 mg, Oral, 30 Min Pre-Op, Hermes Curiel MD, 0.5 mg at 02/06/18 0831  •  sodium chloride 0.9 % flush 1-10 mL, 1-10 mL, Intravenous, PRN, Hermes Curiel MD  Allergies   Allergen Reactions   • Prednisone Other (See Comments)     Chest pain     Social History     Social History   • Marital status:      Spouse name: Karlie   • Number of children: 6   • Years of education: H.S.     Occupational History   • Coal  and farming Retired     Social History Main Topics   • Smoking status: Current Every Day Smoker     Packs/day: 1.50     Years: 50.00     Types: Cigarettes   • Smokeless tobacco: Never Used   • Alcohol use No   • Drug use: No   • Sexual activity: Not Currently     Partners: Female      Comment:      Other Topics Concern   • Not on file     Social  "History Narrative    Caffeine: 3-6 servings per day    Patient lives at home with his wife in [place].     Family History   Problem Relation Age of Onset   • Diabetes Mother    • Hypertension Mother    • Heart disease Mother    • Cancer Mother    • Stroke Father    • Diabetes Father    • Hypertension Father    • Heart disease Father    • Cancer Father    • Anxiety disorder Sister    • Alcohol abuse Brother    • Stroke Brother      Review of Systems   Constitution: Positive for diaphoresis. Negative for chills, fever, malaise/fatigue, night sweats and weight loss.   HENT: Negative for hearing loss, odynophagia and sore throat.    Cardiovascular: Positive for dyspnea on exertion. Negative for chest pain, leg swelling, orthopnea and palpitations.   Respiratory: Positive for cough and shortness of breath. Negative for hemoptysis.    Endocrine: Negative for cold intolerance, heat intolerance, polydipsia, polyphagia and polyuria.   Hematologic/Lymphatic: Does not bruise/bleed easily.   Skin: Negative for itching and rash.   Musculoskeletal: Positive for arthritis, back pain, joint pain and neck pain. Negative for joint swelling and myalgias.   Gastrointestinal: Negative for abdominal pain, constipation, diarrhea, hematemesis, hematochezia, melena, nausea and vomiting.   Genitourinary: Negative for dysuria, frequency and hematuria.   Neurological: Positive for dizziness, light-headedness and loss of balance. Negative for focal weakness, headaches, numbness and seizures.   Psychiatric/Behavioral: Positive for depression. Negative for suicidal ideas. The patient is nervous/anxious.    All other systems reviewed and are negative.    Vitals:    02/06/18 1236   BP: 123/65   BP Location: Right arm   Patient Position: Sitting   Pulse: 56   Temp: 97.2 °F (36.2 °C)   SpO2: 97%   Weight: 107 kg (235 lb 12.8 oz)   Height: 180.3 cm (71\")      Physical Exam   Constitutional: He is oriented to person, place, and time. He appears " well-developed and well-nourished. No distress.   Strong tobacco odor in the examination room.  He is present with his daughter.   HENT:   Head: Normocephalic and atraumatic.   Poor dentition   Eyes: Conjunctivae are normal. No scleral icterus.   Neck: Normal range of motion. No JVD present. Carotid bruit is not present. No tracheal deviation present.   Cardiovascular: Normal rate and regular rhythm.  Exam reveals no gallop and no friction rub.    Murmur heard.  III/VI systolic ejection murmur   Pulmonary/Chest: Effort normal and breath sounds normal. No stridor. No respiratory distress. He has no wheezes. He has no rales.   Abdominal: Soft. He exhibits no distension and no mass. There is no tenderness. There is no rebound and no guarding.   Musculoskeletal: Normal range of motion. He exhibits no edema.   Neurological: He is alert and oriented to person, place, and time.   Skin: Skin is warm and dry. No rash noted. He is not diaphoretic. No erythema.   Psychiatric: He has a normal mood and affect. His behavior is normal. Judgment and thought content normal.       Labs/Imaging:  -Cardiac catheterization performed 1/23/18, personally reviewed, demonstrates an occluded mid LAD, patent LIMA to LAD graft, occluded RCA with patent SVG to PDA, patent diagonal vein graft.  50% circumflex and obtuse marginal stenosis.  There is 70% distal obtuse marginal disease.  Near occluded obtuse marginal graft. EF 40%  -Transesophageal echocardiogram performed 1/23/18, personally reviewed, demonstrates EF 35%, moderate to severe aortic stenosis, trace to mild mitral and tricuspid regurgitation.  Small PFO.  Multiple aortic plaques.    -Transthoracic echocardiogram performed 1/3/18, demonstrates EF 36-45%.  Severe aortic stenosis with mean gradient of 71.9 mmHg.  JULIANNA 0.59.  Trace mitral regurgitation and mild tricuspid regurgitation.      Assessment/Plan:   67 year old male with a history of hypertension, hyperlipidemia, diabetes,  active tobacco abuse, chronic kidney disease, coronary artery disease status post redo CABG, atrial fibrillation and TIA who presents with shortness of breath and syncope secondary to severe symptomatic aortic stenosis.  The patient's calculated STS risk of mortality with an open aortic valve replacement is 8.278%, placing him in the high risk category.  Given his multiple medical comorbidities and previous redo coronary bypass surgery with patent dependent grafts, the patient's only reasonable option is TAVR.  The risks and benefits of surgery were discussed with the patient including pain, bleeding, infection, stroke, worsening kidney function or renal failure and death.  He understood these risks and wished to proceed with surgery.  He will be having his teeth extracted next Wednesday, 2/14/18.  After this is complete, he will be scheduled for surgery.      I, Dr. Pj Duque, personally performed the services described in the documentation as scribed in my presence and the documentation is both accurate and complete.        Patient Active Problem List   Diagnosis   • Chronic systolic congestive heart failure   • SBO (small bowel obstruction)   • Nonrheumatic aortic valve stenosis   • CAD (coronary artery disease)   • Chronic low back pain   • Chronic narcotic use   • Chronic venous insufficiency   • Chronically on benzodiazepine therapy   • CKD (chronic kidney disease) stage 3, GFR 30-59 ml/min   • COPD (chronic obstructive pulmonary disease)   • DM2 (diabetes mellitus, type 2)   • CALVIN (generalized anxiety disorder)   • HLD (hyperlipidemia)   • Hypertension   • LARA (obstructive sleep apnea)   • Osteoarthritis of lumbar spine   • Oxygen dependent   • Porcelain gallbladder   • Tobacco dependence   • Partial small bowel obstruction   • Cardiac disease   • Migraine   • Stroke   • Arthritis   • Alcoholism   • Cancer   • Chronic systolic heart failure   • Encephalopathy   • Essential tremor   • Anxiety   • History  of stroke   • Coronary artery disease involving native coronary artery of native heart without angina pectoris   • Near syncope     Signed by: Pj Duque MD    2/6/2018    CC:  MD Aisha Goodman APRN    Scribed for Pj Duque MD by Sammi Strong. 2/6/2018  2:01 PM

## 2018-02-07 NOTE — TELEPHONE ENCOUNTER
@[id number])@    Other Comments:

## 2018-02-12 NOTE — PROGRESS NOTES
02/12/2018  Patient Information  Ariel Ugalde                                                                                          [id number]   [facility] [address]   1950  'PCP/Referring Physician'  Opal BETTY Santana, APRN  [phone]  No ref. provider found    Chief Complaint   Patient presents with   • Consult     Passing out spells and fatigue.   • Aortic Stenosis       History of Present Illness:  The patient is referred at this time for evaluation of his critical aortic stenosis.  He has been getting very short of breath for several years.  He has had the symptoms for approximately 3 years.  He is unable to do daily activities without getting extraordinarily short of breath.  He has had at least 2  syncopal episode according to the patient.  He has had 2 previous heart surgeries done before.  He had a coronary bypass surgery 9/2000 and 2009.  He has hypertension and diabetes, chronic renal insufficiency, COPD and continuing tobacco abuse.      Patient Active Problem List   Diagnosis   • Chronic systolic congestive heart failure   • SBO (small bowel obstruction)   • Nonrheumatic aortic valve stenosis   • CAD (coronary artery disease)   • Chronic low back pain   • Chronic narcotic use   • Chronic venous insufficiency   • Chronically on benzodiazepine therapy   • CKD (chronic kidney disease) stage 3, GFR 30-59 ml/min   • COPD (chronic obstructive pulmonary disease)   • DM2 (diabetes mellitus, type 2)   • CALVIN (generalized anxiety disorder)   • HLD (hyperlipidemia)   • Hypertension   • LARA (obstructive sleep apnea)   • Osteoarthritis of lumbar spine   • Oxygen dependent   • Porcelain gallbladder   • Tobacco dependence   • Partial small bowel obstruction   • Cardiac disease   • Migraine   • Stroke   • Arthritis   • Alcoholism   • Cancer   • Chronic systolic heart failure   • Encephalopathy   • Essential tremor   • Anxiety   • History of stroke   • Coronary artery disease involving native coronary artery of  native heart without angina pectoris   • Near syncope     Past Medical History:   Diagnosis Date   • Anxiety    • Aortic sclerosis 01/2016   • CAD (coronary artery disease)    • CHF (congestive heart failure)    • Chronic low back pain    • Chronic narcotic use    • Chronic venous insufficiency    • Chronically on benzodiazepine therapy    • CKD (chronic kidney disease) stage 3, GFR 30-59 ml/min    • Colonoscopy refused    • COPD (chronic obstructive pulmonary disease)    • Depression    • Disease of thyroid gland    • DM2 (diabetes mellitus, type 2)    • CALVIN (generalized anxiety disorder)    • GERD (gastroesophageal reflux disease)    • History of MI (myocardial infarction)    • HLD (hyperlipidemia)    • Hypertension    • Obesity    • LARA (obstructive sleep apnea)    • Osteoarthritis of lumbar spine    • Oxygen dependent    • Paroxysmal atrial fibrillation 05/06/2014   • Peripheral neuropathy    • Porcelain gallbladder    • Stroke    • TIA (transient ischemic attack)    • Tobacco dependence    • Tremor, essential      Past Surgical History:   Procedure Laterality Date   • CARDIAC CATHETERIZATION N/A 11/17/2016    Procedure: Left Heart Cath;  Surgeon: Fer Hsu MD;  Location:  [facility];  Service:    • CARDIAC CATHETERIZATION Left 1/23/2018    Procedure: Cardiac Catheterization/Vascular Study;  Surgeon: Fer Hsu MD;  Location:  [facility];  Service:    • CORONARY ANGIOPLASTY WITH STENT PLACEMENT  2012   • CORONARY ARTERY BYPASS GRAFT  2009   • ORIF FOREARM FRACTURE Left 1996   • SMALL INTESTINE SURGERY  1950   • UPPER GASTROINTESTINAL ENDOSCOPY  2014       Current Outpatient Prescriptions:   •  ADVAIR DISKUS 100-50 MCG/DOSE DISKUS, , Disp: , Rfl:   •  amitriptyline (ELAVIL) 25 MG tablet, Take 25 mg by mouth At Night As Needed for Sleep., Disp: , Rfl:   •  aspirin 81 MG EC tablet, Take 1 tablet by mouth Daily., Disp: 90 tablet, Rfl: 4  •  bumetanide (BUMEX) 1 MG  tablet, Take 1 tablet by mouth Every 3 (Three) Days. 2 tablets every 3 days and 3 tablets the next 3 days and repeat, Disp: 180 tablet, Rfl: 4  •  carvedilol (COREG) 12.5 MG tablet, Take 1 tablet by mouth 2 (Two) Times a Day., Disp: 60 tablet, Rfl: 11  •  clonazePAM (KlonoPIN) 0.5 MG tablet, Take 0.5 mg by mouth Every Night., Disp: , Rfl:   •  cyclobenzaprine (FLEXERIL) 10 MG tablet, Take 10 mg by mouth At Night As Needed for Muscle Spasms., Disp: , Rfl:   •  donepezil (ARICEPT) 5 MG tablet, Take 5 mg by mouth Every Night., Disp: , Rfl:   •  ferrous sulfate 325 (65 FE) MG tablet, Take 1 tablet by mouth Daily With Breakfast., Disp: 30 tablet, Rfl: 5  •  gabapentin (NEURONTIN) 800 MG tablet, Take 800 mg by mouth 4 (Four) Times a Day., Disp: , Rfl:   •  HYDROcodone-acetaminophen (NORCO)  MG per tablet, Take 1 tablet by mouth Every 4 (Four) Hours As Needed for moderate pain (4-6)., Disp: , Rfl:   •  insulin aspart (novoLOG) 100 UNIT/ML injection, Inject 10 Units under the skin 3 (Three) Times a Day Before Meals., Disp: , Rfl:   •  insulin glargine (LANTUS) 100 UNIT/ML injection, Inject 20 Units under the skin 2 (Two) Times a Day. Advance dose as diet advances (Patient taking differently: Inject 60 Units under the skin 2 (Two) Times a Day. Advance dose as diet advances), Disp: , Rfl: 12  •  isosorbide mononitrate (IMDUR) 120 MG 24 hr tablet, Take 1 tablet by mouth Daily., Disp: 90 tablet, Rfl: 4  •  levothyroxine (SYNTHROID, LEVOTHROID) 50 MCG tablet, Take 50 mcg by mouth Daily., Disp: , Rfl:   •  Magnesium 400 MG capsule, Take 400 mg by mouth 2 (Two) Times a Day., Disp: 180 capsule, Rfl: 3  •  metOLazone (ZAROXOLYN) 5 MG tablet, Take 1 tablet by mouth 3 (Three) Times a Week., Disp: 15 tablet, Rfl: 4  •  NIACIN ER PO, Take 500 mg by mouth Daily., Disp: , Rfl:   •  nitroglycerin (NITROSTAT) 0.4 MG SL tablet, Place 1 tablet under the tongue Every 5 (Five) Minutes As Needed for Chest Pain. Take no more than 3 doses in  15 minutes., Disp: 25 tablet, Rfl: 5  •  pantoprazole (PROTONIX) 40 MG EC tablet, Take 40 mg by mouth Daily., Disp: , Rfl:   •  PARoxetine (PAXIL) 40 MG tablet, Take 40 mg by mouth Every Morning., Disp: , Rfl:   •  potassium chloride (K-DUR,KLOR-CON) 20 MEQ CR tablet, Take 1 tablet by mouth 2 (Two) Times a Day., Disp: 60 tablet, Rfl: 3  •  rosuvastatin (CRESTOR) 20 MG tablet, Take 1 tablet by mouth Daily., Disp: 90 tablet, Rfl: 4  •  tamsulosin (FLOMAX) 0.4 MG capsule 24 hr capsule, Take 1 capsule by mouth Daily. Take 1/2 hour following the same meal every day, Disp: , Rfl:   •  VENTOLIN  (90 Base) MCG/ACT inhaler, , Disp: , Rfl:   •  vitamin C (ASCORBIC ACID) 500 MG tablet, Take 1 tablet by mouth Daily., Disp: 30 tablet, Rfl: 5  •  vitamin D (ERGOCALCIFEROL) 44984 units capsule capsule, Take 50,000 Units by mouth 1 (One) Time Per Week., Disp: , Rfl:   •  clopidogrel (PLAVIX) 75 MG tablet, Take 1 tablet by mouth Daily., Disp: 30 tablet, Rfl: 12  Allergies   Allergen Reactions   • Prednisone Other (See Comments)     Chest pain     Social History     Social History   • Marital status:      Spouse name: Karlie   • Number of children: 6   • Years of education: H.S.     Occupational History   • Coal  and farming Retired     Social History Main Topics   • Smoking status: Current Every Day Smoker     Packs/day: 1.50     Years: 50.00     Types: Cigarettes   • Smokeless tobacco: Never Used   • Alcohol use No   • Drug use: No   • Sexual activity: Not Currently     Partners: Female      Comment:      Other Topics Concern   • Not on file     Social History Narrative    Caffeine: 3-6 servings per day    Patient lives at home with his wife in [place].     Family History   Problem Relation Age of Onset   • Diabetes Mother    • Hypertension Mother    • Heart disease Mother    • Cancer Mother    • Stroke Father    • Diabetes Father    • Hypertension Father    • Heart disease Father    • Cancer  "Father    • Anxiety disorder Sister    • Alcohol abuse Brother    • Stroke Brother      Review of Systems   Constitution: Positive for diaphoresis, weakness and malaise/fatigue. Negative for chills, fever, night sweats and weight loss.   HENT: Positive for hearing loss. Negative for odynophagia and sore throat.    Eyes: Positive for visual disturbance (can not see well).   Cardiovascular: Positive for chest pain, claudication, dyspnea on exertion, leg swelling and syncope. Negative for orthopnea and palpitations.   Respiratory: Positive for cough and shortness of breath. Negative for hemoptysis.    Endocrine: Negative.  Negative for cold intolerance, heat intolerance, polydipsia, polyphagia and polyuria.   Hematologic/Lymphatic: Negative.  Does not bruise/bleed easily.   Skin: Negative.  Negative for itching and rash.   Musculoskeletal: Positive for back pain, falls, joint pain and muscle cramps (hands). Negative for joint swelling and myalgias.   Gastrointestinal: Positive for nausea. Negative for abdominal pain, constipation, diarrhea, hematemesis, hematochezia, melena and vomiting.   Genitourinary: Negative.  Negative for dysuria, frequency and hematuria.   Neurological: Positive for dizziness, light-headedness, loss of balance and tremors. Negative for focal weakness, headaches, numbness and seizures.   Psychiatric/Behavioral: Positive for depression. Negative for suicidal ideas. The patient is nervous/anxious.    Allergic/Immunologic: Negative.    All other systems reviewed and are negative.    Vitals:    02/12/18 1411   BP: 124/80   BP Location: Right arm   Pulse: 90   Temp: 97.8 °F (36.6 °C)   SpO2: 96%   Weight: 107 kg (236 lb)   Height: 180.3 cm (71\")      Physical Exam   Constitutional: He is oriented to person, place, and time. He appears well-developed and well-nourished. No distress.   HENT:   Head: Normocephalic.   Eyes: EOM are normal. Pupils are equal, round, and reactive to light.   Neck: Normal " range of motion. Carotid bruit is not present. No thyromegaly present.   Cardiovascular: Normal rate and regular rhythm.  Exam reveals no gallop and no friction rub.    Murmur heard.  Pulmonary/Chest: He has no wheezes. He has no rales.   Abdominal: Soft. Bowel sounds are normal. He exhibits no distension and no mass. There is no hepatomegaly. There is no tenderness.   Musculoskeletal: Normal range of motion. He exhibits no deformity.   Neurological: He is alert and oriented to person, place, and time. He has normal strength. No cranial nerve deficit or sensory deficit.   Skin: No bruising and no petechiae noted. No cyanosis. Nails show no clubbing.   Psychiatric: He has a normal mood and affect.       Labs/Imaging:  I have obtained and reviewed his records in detail as well as his cardiac cath films.    Assessment/Plan:   Mr. Ugalde is a 67 year old gentleman who has severe aortic stenosis.  I have obtained and reviewed his records in detail, as well as obtained and reviewed his cardiac catheterization films.  At this point, I do think a TAVR without question is his best option.  I think his operative mortality risk certainly much greater than 5%, that it is probably closer to 10-20% operative mortality using the traditional aortic valve replacement.  I think a TAVR is his only acceptable option.  We went over the procedure and answered all of his questions.  Also I emphasized the importance of stopping smoking before surgery.  He appears to understand all of the above and has no further questions.    Patient Active Problem List   Diagnosis   • Chronic systolic congestive heart failure   • SBO (small bowel obstruction)   • Nonrheumatic aortic valve stenosis   • CAD (coronary artery disease)   • Chronic low back pain   • Chronic narcotic use   • Chronic venous insufficiency   • Chronically on benzodiazepine therapy   • CKD (chronic kidney disease) stage 3, GFR 30-59 ml/min   • COPD (chronic obstructive pulmonary  disease)   • DM2 (diabetes mellitus, type 2)   • CALVIN (generalized anxiety disorder)   • HLD (hyperlipidemia)   • Hypertension   • LARA (obstructive sleep apnea)   • Osteoarthritis of lumbar spine   • Oxygen dependent   • Porcelain gallbladder   • Tobacco dependence   • Partial small bowel obstruction   • Cardiac disease   • Migraine   • Stroke   • Arthritis   • Alcoholism   • Cancer   • Chronic systolic heart failure   • Encephalopathy   • Essential tremor   • Anxiety   • History of stroke   • Coronary artery disease involving native coronary artery of native heart without angina pectoris   • Near syncope     Signed by: Fer Gould M.D.    2/12/2018    CC:  HERNÁN Easley, , editing for Fer Gould M.D.    I, Fer Gould MD, have read and agree with the editing done by Sophia Strong, .

## 2018-02-13 PROBLEM — I35.0 AORTIC VALVE STENOSIS: Status: ACTIVE | Noted: 2018-01-01

## 2018-02-22 PROBLEM — I35.9 AORTIC VALVE DISORDER: Status: ACTIVE | Noted: 2018-01-01

## 2018-02-22 NOTE — ANESTHESIA PROCEDURE NOTES
Airway  Urgency: elective    Airway not difficult    General Information and Staff    Patient location during procedure: OR    Indications and Patient Condition  Indications for airway management: airway protection    Preoxygenated: yes  MILS not maintained throughout  Mask difficulty assessment: 1 - vent by mask    Final Airway Details  Final airway type: endotracheal airway      Successful airway: ETT  Cuffed: yes   Successful intubation technique: direct laryngoscopy  Endotracheal tube insertion site: oral  Blade: Amos  Blade size: #3  ETT size: 8.0 mm  Cormack-Lehane Classification: grade I - full view of glottis  Placement verified by: chest auscultation and capnometry   Measured from: lips  ETT to lips (cm): 20  Number of attempts at approach: 1    Additional Comments  Negative epigastric sounds, Breath sound equal bilaterally with symmetric chest rise and fall

## 2018-02-22 NOTE — ANESTHESIA POSTPROCEDURE EVALUATION
Patient: Ariel Ugalde    Procedure Summary     Date Anesthesia Start Anesthesia Stop Room / Location    02/22/18 0720  [facility] OR 15 /  [facility]       Procedure Diagnosis Provider Provider    Transcatheter Aortic Valve Replacement with DAKOTAH (N/A Chest); Tavr (N/A ) Aortic valve stenosis, etiology of cardiac valve disease unspecified  (Aortic valve stenosis, etiology of cardiac valve disease unspecified [I35.0]) Fer Gould MD; MD Harish Bundy MD          Anesthesia Type: general  Last vitals  BP   125/80 (02/22/18 0611)   Temp   97.1 °F (36.2 °C) (02/22/18 0611)   Pulse   68 (02/22/18 0611)   Resp   18 (02/22/18 0611)     SpO2   96 % (02/22/18 0611)     Post Anesthesia Care and Evaluation    Patient location during evaluation: ICU  Patient participation: complete - patient participated  Level of consciousness: awake and alert  Pain score: 0  Pain management: adequate  Airway patency: patent  Anesthetic complications: No anesthetic complications  PONV Status: none  Cardiovascular status: hemodynamically stable and acceptable  Respiratory status: nonlabored ventilation, acceptable and nasal cannula  Hydration status: acceptable

## 2018-02-22 NOTE — ANESTHESIA PROCEDURE NOTES
Central Line    Patient location during procedure: OR  Start time: 2/22/2018 7:28 AM  Stop Time:2/22/2018 7:35 AM  Indications: vascular access  Staff  Anesthesiologist: TO LANDEROS  Preanesthetic Checklist  Completed: patient identified, site marked, surgical consent, pre-op evaluation, timeout performed, IV checked, risks and benefits discussed and monitors and equipment checked  Central Line Prep  Sterile Tech:cap, gloves, gown, mask and sterile barriers  Prep: chloraprep  Patient monitoring: blood pressure monitoring, continuous pulse oximetry and EKG  Central Line Procedure  Location:internal jugular  Catheter Type:Cordis  Catheter Size:9 Fr  Guidance:ultrasound guided  PROCEDURE NOTE/ULTRASOUND INTERPRETATION.  Using ultrasound guidance the potential vascular sites for insertion of the catheter were visualized to determine the patency of the vessel to be used for vascular access.  After selecting the appropriate site for insertion, the needle was visualized under ultrasound being inserted into the internal jugular vein, followed by ultrasound confirmation of wire and catheter placement. There were no abnormalities seen on ultrasound; an image was taken; and the patient tolerated the procedure with no complications.   Assessment  Post procedure:biopatch applied, line sutured, occlusive dressing applied and secured with tape  Assessement:blood return through all ports, free fluid flow and chest x-ray ordered  Complications:no  Patient Tolerance:patient tolerated the procedure well with no apparent complications

## 2018-02-22 NOTE — ANESTHESIA PREPROCEDURE EVALUATION
Anesthesia Evaluation     Patient summary reviewed and Nursing notes reviewed                Airway   Mallampati: I  TM distance: >3 FB  Neck ROM: full  no difficulty expected  Dental      Pulmonary    (+) COPD, sleep apnea,   Cardiovascular     ECG reviewed    (+) hypertension, valvular problems/murmurs, CAD, CABG, dysrhythmias, CHF, PVD, hyperlipidemia      Neuro/Psych  (+) TIA, CVA, headaches,     GI/Hepatic/Renal/Endo    (+) obesity, morbid obesity, GERD, diabetes mellitus,     Musculoskeletal (-) negative ROS    Abdominal    Substance History - negative use     OB/GYN negative ob/gyn ROS         Other                        Anesthesia Plan    ASA 4     general   (Darya, cvp)  intravenous induction   Anesthetic plan and risks discussed with patient.

## 2018-02-22 NOTE — ANESTHESIA PROCEDURE NOTES
Arterial Line    Patient location during procedure: pre-op  Start time: 2/22/2018 6:35 AM  Stop Time:2/22/2018 6:47 AM       Line placed for hemodynamic monitoring.  Performed By   Anesthesiologist: TO LANDEROS  Preanesthetic Checklist  Completed: patient identified, site marked, surgical consent, pre-op evaluation, timeout performed, IV checked, risks and benefits discussed and monitors and equipment checked  Arterial Line Prep   Sterile Tech: cap, gloves and sterile barriers  Prep: ChloraPrep  Patient monitoring: blood pressure monitoring, continuous pulse oximetry and EKG  Arterial Line Procedure   Laterality:right  Location:  radial artery  Catheter size: 20 G   Guidance: palpation technique  Number of attempts: 1  Successful placement: yes          Post Assessment   Dressing Type: line sutured, occlusive dressing applied, secured with tape and wrist guard applied.   Complications no  Circ/Move/Sens Assessment: normal and unchanged.   Patient Tolerance: patient tolerated the procedure well with no apparent complications

## 2018-02-26 NOTE — TELEPHONE ENCOUNTER
Spoke with patient's daughter Mae by phone.  She states Mr. Ugalde is doing well.  Femoral cutdown is a little sore, but otherwise, he feels good.  Called home number but no answer.  Will see in clinic @ follow up.

## 2018-02-26 NOTE — PROGRESS NOTES
Order received for Phase II Cardiac Rehab. Staff will contact patient regarding program information and scheduling.

## 2018-02-27 NOTE — TELEPHONE ENCOUNTER
Patient discharged on 2/24/18 and will follow up in clinic 3/2/18. Refill carvedilol and bumetanide for one month per HERNÁN Dawson and he will be re-evaluated at follow-up visit.    Diamond Gill, PharmD

## 2018-03-02 PROBLEM — I35.0 NONRHEUMATIC AORTIC VALVE STENOSIS: Status: ACTIVE | Noted: 2018-01-01

## 2018-03-02 NOTE — PROGRESS NOTES
"  Subjective:     Encounter Date:03/02/2018      Patient ID: Ariel Ugalde is a 67 y.o. male.    Chief Complaint: aortic stenosis/ systolic HF check up post hospital DC TAVR    History of Present Illness:  Mr. Ugalde was discharged post TAVR on POD #2.  Due to poorly controlled diabetes, low LVEF, and COPD, he was scheduled for short interval follow up today.      Overall, he states he feels good.  He has been outside painting and doing some chores.  He states his breathing is \"pretty good\" and he is very pleased to report his lower extremity edema has resolved.  He is bothered by persistent bruising, but the leg incision soreness is nearly resolved.      Past Medical History:   Diagnosis Date   • Anxiety    • Aortic sclerosis 01/2016   • CAD (coronary artery disease)    • CHF (congestive heart failure)    • Chronic low back pain    • Chronic narcotic use    • Chronic venous insufficiency    • Chronically on benzodiazepine therapy    • CKD (chronic kidney disease) stage 3, GFR 30-59 ml/min    • Colonoscopy refused    • COPD (chronic obstructive pulmonary disease)    • Depression    • Disease of thyroid gland    • DM2 (diabetes mellitus, type 2)     DIAGNOSED 2009; CHECKS FSBG 3-4 TIMES DAILY    • CALVIN (generalized anxiety disorder)    • GERD (gastroesophageal reflux disease)    • History of MI (myocardial infarction)    • HLD (hyperlipidemia)    • Hypertension    • Obesity    • LARA (obstructive sleep apnea)     INSTRUCTED TO BRING OWN MASK AND TUBING.    • Osteoarthritis of lumbar spine    • Oxygen dependent     WEARS 3L AT NIGHT    • Paroxysmal atrial fibrillation 05/06/2014   • Peripheral neuropathy    • Porcelain gallbladder    • Stroke    • TIA (transient ischemic attack)    • Tobacco dependence    • Tremor, essential    • Wears reading eyeglasses        Past Surgical History:   Procedure Laterality Date   • AORTIC VALVE REPAIR/REPLACEMENT N/A 2/22/2018    Procedure: Transcatheter Aortic Valve Replacement with " DAKOTAH;  Surgeon: Fer Gould MD;  Location:  [facility];  Service:    • CARDIAC CATHETERIZATION N/A 11/17/2016    Procedure: Left Heart Cath;  Surgeon: Fer Hsu MD;  Location:  [facility];  Service:    • CARDIAC CATHETERIZATION Left 1/23/2018    Procedure: Cardiac Catheterization/Vascular Study;  Surgeon: Fer Hsu MD;  Location:  [facility];  Service:    • CARDIAC SURGERY      2000 AND REPEAT IN 2009 QUADRUPLE BYPASS BOTH EVENTS.    • CORONARY ANGIOPLASTY WITH STENT PLACEMENT  2012   • CORONARY ARTERY BYPASS GRAFT  2009   • MULTIPLE TOOTH EXTRACTIONS      UPPER AND LOWER    • ORIF FOREARM FRACTURE Left 1996   • SMALL INTESTINE SURGERY  1950   • UPPER GASTROINTESTINAL ENDOSCOPY  2014       Social History     Social History   • Marital status:      Spouse name: Karlie   • Number of children: 6   • Years of education: H.S.     Occupational History   • Coal  and farming Retired     Social History Main Topics   • Smoking status: Current Every Day Smoker     Packs/day: 1.50     Years: 50.00     Types: Cigarettes   • Smokeless tobacco: Never Used   • Alcohol use No   • Drug use: No   • Sexual activity: Not Currently     Partners: Female      Comment:      Other Topics Concern   • Not on file     Social History Narrative    Caffeine: 3-6 servings per day    Patient lives at home with his wife in [place].       Family History   Problem Relation Age of Onset   • Diabetes Mother    • Hypertension Mother    • Heart disease Mother    • Cancer Mother    • Stroke Father    • Diabetes Father    • Hypertension Father    • Heart disease Father    • Cancer Father    • Anxiety disorder Sister    • Alcohol abuse Brother    • Stroke Brother        Review of Systems   Constitution: Positive for weight gain. Negative for chills, decreased appetite, diaphoresis, fever, weakness, malaise/fatigue, night sweats and weight loss.   HENT: Positive for  "hearing loss. Negative for congestion, hoarse voice and nosebleeds.    Eyes: Negative for blurred vision, visual disturbance and visual halos.   Cardiovascular: Positive for claudication, dyspnea on exertion, leg swelling and orthopnea. Negative for chest pain, cyanosis, irregular heartbeat, near-syncope, palpitations, paroxysmal nocturnal dyspnea and syncope.   Respiratory: Positive for snoring. Negative for cough, hemoptysis, shortness of breath, sleep disturbances due to breathing, sputum production and wheezing.    Hematologic/Lymphatic: Negative for bleeding problem. Bruises/bleeds easily.   Skin: Negative for dry skin, itching and rash.   Musculoskeletal: Positive for joint pain, muscle cramps and muscle weakness. Negative for arthritis, joint swelling and myalgias.   Gastrointestinal: Positive for melena. Negative for bloating, abdominal pain, constipation, diarrhea, flatus, heartburn, hematemesis, hematochezia, nausea and vomiting.   Genitourinary: Positive for frequency. Negative for dysuria, hematuria, nocturia and urgency.   Neurological: Positive for excessive daytime sleepiness and loss of balance. Negative for dizziness, headaches and light-headedness.   Psychiatric/Behavioral: Positive for memory loss. Negative for depression. The patient has insomnia and is nervous/anxious.      Vitals:    03/02/18 1028 03/02/18 1031 03/02/18 1033   BP: 128/83 120/76 97/65   BP Location: Right arm Left arm Left arm   Patient Position: Sitting Sitting Standing   Pulse: 89  91   Resp: 18     Temp: 97.7 °F (36.5 °C)     TempSrc: Temporal Artery      SpO2: 95%     Weight: 105 kg (231 lb 6.4 oz)     Height: 180.3 cm (70.98\")         Objective:     Physical Exam   Constitutional: He is oriented to person, place, and time. He appears well-developed and well-nourished. No distress.   Cardiovascular: Normal rate and intact distal pulses.    No murmur heard.  Irregular rhythm/ controlled rate   Pulmonary/Chest: Effort normal. " No respiratory distress. He has no wheezes. He has no rales. He exhibits no tenderness.   Breath sounds clear but diminished   Musculoskeletal: Normal range of motion. He exhibits no edema.   Neurological: He is alert and oriented to person, place, and time. No cranial nerve deficit.   Skin: Skin is warm and dry.   Ecchymosis to neck and left upper chest.  Superficial ecchymosis both groins.  Left femoral cutdown site well approximated without induration, exudate or tenderness   Psychiatric: He has a normal mood and affect. His behavior is normal.       Lab Review: EKG has afib with controlled ventricular rate @ 76 bom       Ref Range & Units 11:23 AM     WBC 3.50 - 10.80 10*3/mm3 8.22   RBC 4.20 - 5.76 10*6/mm3 3.83 (L)   Hemoglobin 13.1 - 17.5 g/dL 11.8 (L)   Hematocrit 38.9 - 50.9 % 35.5 (L)   MCV 80.0 - 99.0 fL 92.7   MCH 27.0 - 31.0 pg 30.8   MCHC 32.0 - 36.0 g/dL 33.2   RDW 11.3 - 14.5 % 16.3 (H)   RDW-SD 37.0 - 54.0 fl 52.3   MPV 6.0 - 12.0 fL 10.4   Platelets 150 - 450 10*3/mm3 185           Basic Metabolic Panel   Order: [id number]   Status:  Final result   Visible to patient:  No (Not Released)   Dx:  Dyspnea on exertion      Ref Range & Units 11:23 AM     Glucose 70 - 100 mg/dL 301 (H)   BUN 9 - 23 mg/dL 31 (H)   Creatinine 0.60 - 1.30 mg/dL 1.60 (H)   Sodium 132 - 146 mmol/L 136   Potassium 3.5 - 5.5 mmol/L 5.0   Chloride 99 - 109 mmol/L 98 (L)   CO2 20.0 - 31.0 mmol/L 31.0   Calcium 8.7 - 10.4 mg/dL 9.3   eGFR Non African Amer >60 mL/min/1.73 43 (L)   BUN/Creatinine Ratio 7.0 - 25.0 19.4   Anion Gap 3.0 - 11.0 mmol/L 7.0                Assessment/ Plan:           Diagnosis Plan   1. Paroxysmal atrial fibrillation CHADS VASC = 7.  Controlled ventricular rate w/ Coreg.  DC plavix.  Add Eliquis 5 mg BID.  Follow up EKG next OV on 3/26/18.  Reviewed s/sx bleeding to report promptly.  Repeat labs next OV as well.     2. Chronic systolic congestive heart failure  Intra op LVEF measured @ 25%.  NYHA class  II-III.  Coreg, diuretic/KCL     3. Severe aortic stenosis s/p TAVR 2/22/18 with 26 mm Lynne Irina 3 valve.  DC plavix due to need for anticoagulation.  Continue ASA.  No s/sx incision infection or poor approximation.     4. CAD s/p CABG/ re-do CABG.  Most recent PCI 11/2016.  Pre-TAVR cath 1/23/18 showed patent LM stent, patent LIMA to LAD, patent SVG to PDA but occluded SVG to OM, 80% stenosis mid SVG to diagonal (aneurysmal vessel).  ASA,statin, BB     5. Centrilobular emphysema  Again discuss smoking cessation.     6. Type 2 diabetes mellitus with other neurologic complication, with long-term current use of insulin  Poorly controlled.  Admission HgA1C 9.2%.  Follow up with Opal J[facility]   7. CKD stage III Creatine worsened since hospital DC.  1.6 today compared to 1.0 @ discharge.  Reduce diuretics (Bumex) to 1 mg daily and potassium reduced to daily.  Re-check BMP on 3/26/18 also.

## 2018-03-06 NOTE — PROGRESS NOTES
Pt. Referred for Phase II Cardiac Rehab. Staff left detailed message with Patient to return phone call in regards to scheduling an appointment.

## 2018-03-12 ENCOUNTER — TELEPHONE (OUTPATIENT)
Dept: CARDIOLOGY | Facility: HOSPITAL | Age: 68
End: 2018-03-12

## 2018-03-12 NOTE — TELEPHONE ENCOUNTER
Patient's daughter, Mae, called to say that Mr. Ugalde passed away on Saturday 3/10/18.  She states he was found  @ home by a neighbor checking on him.

## 2018-03-20 ENCOUNTER — APPOINTMENT (OUTPATIENT)
Dept: CARDIOLOGY | Facility: HOSPITAL | Age: 68
End: 2018-03-20

## 2018-03-26 ENCOUNTER — APPOINTMENT (OUTPATIENT)
Dept: CARDIOLOGY | Facility: HOSPITAL | Age: 68
End: 2018-03-26

## (undated) DEVICE — MODEL BT2000 P/N 700287-012KIT CONTENTS: MANIFOLD WITH SALINE AND CONTRAST PORTS, SALINE TUBING WITH SPIKE AND HAND SYRINGE, TRANSDUCER: Brand: BT2000 AUTOMATED MANIFOLD KIT

## (undated) DEVICE — RADIFOCUS GLIDEWIRE: Brand: GLIDEWIRE

## (undated) DEVICE — CATH4F INF PIG 145Â° MOD 110CM: Brand: INFINITI

## (undated) DEVICE — ANTIBACTERIAL UNDYED BRAIDED (POLYGLACTIN 910), SYNTHETIC ABSORBABLE SUTURE: Brand: COATED VICRYL

## (undated) DEVICE — CATH PACE PACEL BIPOL 5F110CM

## (undated) DEVICE — SLV REPOSTNG CATH STRL 60CM

## (undated) DEVICE — SOL NS 500ML

## (undated) DEVICE — GW AMPLTZ SUPERSTIFF STR .035IN 180CM

## (undated) DEVICE — SUT PROLN 7/0 BV1 24IN 4PK M8702

## (undated) DEVICE — DRSNG SURG AQUACEL AG 9X10CM

## (undated) DEVICE — CATH DIAG EXPO M/ PK 6FR FL4/FR4 PIG 3PK

## (undated) DEVICE — PINNACLE INTRODUCER SHEATH: Brand: PINNACLE

## (undated) DEVICE — PK CATH CARD 10

## (undated) DEVICE — SUT MONOCRYL PLS ANTIB UND 3/0  PS1 27IN

## (undated) DEVICE — MODEL AT P65, P/N 701554-001KIT CONTENTS: HAND CONTROLLER, 3-WAY HIGH-PRESSURE STOPCOCK WITH ROTATING END AND PREMIUM HIGH-PRESSURE TUBING: Brand: ANGIOTOUCH® KIT

## (undated) DEVICE — SKIN AFFIX SURG ADHESIVE 72/CS 0.55ML: Brand: MEDLINE

## (undated) DEVICE — SUT PROLN 6/0 C1 D/A 30IN 8706H

## (undated) DEVICE — CATH DIAG EXPO .056 LCB 6F 100CM

## (undated) DEVICE — GW CERBRL FC PTFE STD/STR .035 260CM

## (undated) DEVICE — SHEET, DRAPE, SPLIT, STERILE: Brand: MEDLINE

## (undated) DEVICE — SUT PROLN 4/0 BB D/A 36IN 8581H

## (undated) DEVICE — A2000 MULTI-USE SYRINGE KIT, P/N 701277-003KIT CONTENTS: 100ML CONTRAST RESERVOIR AND TUBING WITH CONTRAST SPIKE AND CLAMP: Brand: A2000 MULTI-USE SYRINGE KIT

## (undated) DEVICE — ELECTRD BLD 1P STD SS 3/32X2.44IN

## (undated) DEVICE — DRSNG WND GZ PAD BORDERED 4X8IN STRL

## (undated) DEVICE — BOWL UTIL STRL 32OZ

## (undated) DEVICE — PRESSURE MONITORING ACCESSORY: Brand: TRUWAVE

## (undated) DEVICE — LN INJ CONTRST FLXCIL HP F/M LL 1200PSI48

## (undated) DEVICE — 3M™ TEGADERM™ CHG DRESSING 25/CARTON 4 CARTONS/CASE 1658: Brand: TEGADERM™

## (undated) DEVICE — NDL PERC 1PRT THNWALL W/BASEPLT 18G 7CM

## (undated) DEVICE — MEDI-VAC NON-CONDUCTIVE SUCTION TUBING: Brand: CARDINAL HEALTH

## (undated) DEVICE — SYR LUERLOK 30CC

## (undated) DEVICE — SI AVANTI+ 7F STD W/GW  NO OBT: Brand: AVANTI

## (undated) DEVICE — GLIDEX™ COATED HYDROPHILIC GUIDEWIRE: Brand: MAGIC TORQUE™

## (undated) DEVICE — GW J TP FIX CORE .035 150

## (undated) DEVICE — CLTH CLENS READYCLEANSE PERI CARE PK/5

## (undated) DEVICE — PRESSURE MONITORING SET: Brand: TRUWAVE, VAMP

## (undated) DEVICE — SUT SILK 4/0 TIES 18IN A183H

## (undated) DEVICE — DRAPE,TOP,102X53,STERILE: Brand: MEDLINE

## (undated) DEVICE — ENCORE® LATEX MICRO SIZE 6.5, STERILE LATEX POWDER-FREE SURGICAL GLOVE: Brand: ENCORE

## (undated) DEVICE — CABL PACE ATRIAL PT BLU

## (undated) DEVICE — SUCTION CANISTER, 2500CC, RIGID: Brand: DEROYAL

## (undated) DEVICE — CATH GUIDE BERN 5F 65CM

## (undated) DEVICE — Device

## (undated) DEVICE — GW SAFARI2 PRESH XSM CRV .035IN 3.2X2.9X275CM

## (undated) DEVICE — CANNULA,ADULT,SOFT-TOUCH,7'TUBE,UC: Brand: PENDING

## (undated) DEVICE — PK HEART OPN 10

## (undated) DEVICE — KT MANIFOLD CATHLAB CUST

## (undated) DEVICE — SUT SILK 3/0 TIES 18IN A184H

## (undated) DEVICE — DRSNG SURESITE WNDW 4X4.5

## (undated) DEVICE — GUIDE CATHETER: Brand: MACH1™